# Patient Record
Sex: FEMALE | Race: AMERICAN INDIAN OR ALASKA NATIVE
[De-identification: names, ages, dates, MRNs, and addresses within clinical notes are randomized per-mention and may not be internally consistent; named-entity substitution may affect disease eponyms.]

---

## 2017-02-13 ENCOUNTER — HOSPITAL ENCOUNTER (EMERGENCY)
Dept: HOSPITAL 5 - ED | Age: 67
Discharge: HOME | End: 2017-02-13
Payer: MEDICARE

## 2017-02-13 VITALS — SYSTOLIC BLOOD PRESSURE: 160 MMHG | DIASTOLIC BLOOD PRESSURE: 100 MMHG

## 2017-02-13 DIAGNOSIS — F17.200: ICD-10-CM

## 2017-02-13 DIAGNOSIS — J44.9: ICD-10-CM

## 2017-02-13 DIAGNOSIS — R05: ICD-10-CM

## 2017-02-13 DIAGNOSIS — J02.9: Primary | ICD-10-CM

## 2017-02-13 DIAGNOSIS — I63.9: ICD-10-CM

## 2017-02-13 DIAGNOSIS — J01.90: ICD-10-CM

## 2017-02-13 DIAGNOSIS — I10: ICD-10-CM

## 2017-02-13 PROCEDURE — 71020: CPT

## 2017-02-13 PROCEDURE — 94640 AIRWAY INHALATION TREATMENT: CPT

## 2017-02-13 NOTE — EMERGENCY DEPARTMENT REPORT
ED Shortness of Breath HPI





- General


Chief Complaint: Upper Respiratory Infection


Stated Complaint: UPPER RESP INFECTION


Time Seen by Provider: 02/13/17 19:19


Source: patient


Mode of arrival: Ambulatory


Limitations: No Limitations





- History of Present Illness


Initial Comments: 





Patient here complaining of cough and cold symptoms and reported her sinuses 

are acting up.  She says she has a productive cough with yellow mucus.  Denies 

any fever or chills.  Patient stated that she walked to her primary care 

physician office today and that with atrial flutter breath.  She says she is 

out of her oxygen tank at home and Dr. CARLTON ordered oxygen for her and  will 

receive tomorrow.  She denies any abdominal pain, nausea or vomiting.  Patient 

says she is on oxygen as needed for chronic obstructive pulmonary disease.  She 

says she is not on any medication.  Denies any chest pain.


MD Complaint: shortness of breath, cough


-: This afternoon


Pain Scale: 0


Improves With: oxygen


Worsens With: nothing


Known History Of: COPD


Context: recent URI, occured during exertion


Associated Symptoms: cough, sputum production


Treatments Prior to Arrival: none





- Related Data


Home Oxygen Therapy: Yes


Home Oxygen Amount: 2 Liters


 Previous Rx's











 Medication  Instructions  Recorded  Last Taken  Type


 


cloNIDine [Catapres] 0.2 mg PO TID #30 tablet 09/22/15 2 Days Ago Rx





   0.2 


 


hydrALAZINE [Apresoline TAB] 50 mg PO Q8HR #90 tablet 09/22/15 1 Day Ago Rx





   50 


 


ALBUTEROL Inhaler [ProAir HFA 2 puff IH QID PRN #1 inhalation 02/13/17 Unknown 

Rx





Inhaler]    


 


Azithromycin [Zithromax Z-MAGEN] 250 mg PO DAILY #6 tab 02/13/17 Unknown Rx


 


Fluticasone [Flonase] 1 spray NS QDAY #1 bottle 02/13/17 Unknown Rx


 


predniSONE [Deltasone] 50 mg PO QDAY #6 tab 02/13/17 Unknown Rx











 Allergies











Allergy/AdvReac Type Severity Reaction Status Date / Time


 


No Known Allergies Allergy   Verified 09/18/15 07:34














ED Review of Systems


ROS: 


Stated complaint: UPPER RESP INFECTION


Other details as noted in HPI





Comment: All other systems reviewed and negative


Constitutional: denies: chills, fever


Eyes: denies: eye discharge


ENT: congestion


Respiratory: cough, shortness of breath, SOB with exertion.  denies: orthopnea, 

wheezing


Cardiovascular: denies: chest pain, palpitations, edema, syncope


Gastrointestinal: denies: abdominal pain, nausea, vomiting


Musculoskeletal: denies: back pain, arthralgia


Skin: denies: rash


Neurological: denies: headache, weakness, abnormal gait, vertigo





ED Past Medical Hx





- Past Medical History


Previous Medical History?: Yes


Hx Hypertension: Yes


Hx CVA: Yes (hemorragic cva 9/2015)


Hx COPD: Yes


Hx Dementia: No


Hx HIV: No


Additional medical history: bells palsy





- Surgical History


Past Surgical History?: No





- Family History


Family history: hypertension





- Social History


Smoking Status: Current Some Day Smoker


Substance Use Type: None





- Medications


Home Medications: 


 Home Medications











 Medication  Instructions  Recorded  Confirmed  Last Taken  Type


 


cloNIDine [Catapres] 0.2 mg PO TID #30 tablet 09/22/15 09/18/16 2 Days Ago Rx





    0.2 


 


hydrALAZINE [Apresoline TAB] 50 mg PO Q8HR #90 tablet 09/22/15 09/18/16 1 Day 

Ago Rx





    50 


 


ALBUTEROL Inhaler [ProAir HFA 2 puff IH QID PRN #1 inhalation 02/13/17  Unknown 

Rx





Inhaler]     


 


Azithromycin [Zithromax Z-MAGEN] 250 mg PO DAILY #6 tab 02/13/17  Unknown Rx


 


Fluticasone [Flonase] 1 spray NS QDAY #1 bottle 02/13/17  Unknown Rx


 


predniSONE [Deltasone] 50 mg PO QDAY #6 tab 02/13/17  Unknown Rx














ED Physical Exam





- General


Limitations: No Limitations


General appearance: alert, in no apparent distress





- Head


Head exam: Present: atraumatic, normocephalic, normal inspection





- Eye


Eye exam: Present: normal appearance, PERRL, EOMI


Pupils: Present: normal accommodation





- ENT


ENT exam: Present: normal external ear exam, other (Bilateral nasal mucosa 

erythema congested.  Sinuses nontender to palpate.).  Absent: TM's normal 

bilaterally (Bilateral TMs congested.)





- Respiratory


Respiratory exam: Present: wheezes, rhonchi, decreased breath sounds, other (

congested cough).  Absent: respiratory distress, rales, stridor, chest wall 

tenderness, accessory muscle use





- Cardiovascular


Cardiovascular Exam: Present: regular rate, normal rhythm, normal heart sounds





- GI/Abdominal


GI/Abdominal exam: Present: soft, normal bowel sounds





- Extremities Exam


Extremities exam: Present: normal inspection, full ROM, normal capillary 

refill.  Absent: tenderness, pedal edema, joint swelling, calf tenderness





- Back Exam


Back exam: Present: normal inspection, full ROM.  Absent: tenderness, CVA 

tenderness (R), CVA tenderness (L), muscle spasm, paraspinal tenderness, 

vertebral tenderness, rash noted





- Neurological Exam


Neurological exam: Present: alert, oriented X3, normal gait, reflexes normal.  

Absent: motor sensory deficit





- Psychiatric


Psychiatric exam: Present: normal affect, normal mood





- Skin


Skin exam: Present: warm, dry, intact, normal color.  Absent: rash





ED Course


 Vital Signs











  02/13/17 02/13/17 02/13/17





  14:32 18:33 19:02


 


Temperature 98.6 F 98.8 F 


 


Pulse Rate 93 H 89 74


 


Respiratory  20 





Rate   


 


Blood Pressure 136/91 160/100 


 


O2 Sat by Pulse  93 90





Oximetry   














 Vital Signs











  02/13/17 02/13/17 02/13/17





  14:32 18:33 19:02


 


Temperature 98.6 F 98.8 F 


 


Pulse Rate 93 H 89 74


 


Respiratory  20 





Rate   


 


Blood Pressure 136/91 160/100 


 


O2 Sat by Pulse  93 90





Oximetry   














  02/13/17





  20:59


 


Temperature 


 


Pulse Rate 


 


Respiratory 





Rate 


 


Blood Pressure 


 


O2 Sat by Pulse 93





Oximetry 














- Reevaluation(s)


Reevaluation #1: 





02/13/17 21:03


I spoke with Dr. Rea.  Patient to follow up with him in his office tomorrow


Reevaluation #2: 





02/13/17 21:21


Status post nebulizer treatment.  Patient's pulse ox is 93% ambulatory.  Lungs 

sounds with scattered wheezing to upper lung fields.





ED Medical Decision Making





- Radiology Data


Radiology results: image reviewed


interpreted by me: 





Chest x-ray revealed no acute cardiopulmonary processes. Xray reviewed by Dr. May





- Medical Decision Making





ED course: Patient presented to emergency room with worsening shortness of 

breath after walking to her primary care physician office today.  She has a 

history of chronic obstructive pulmonary disease and says that she uses home O2 

as needed.  She says she went to her primary care doctor for antibiotic for 

sinus infection and for him to O2 tank.  I discussed the patient and S2 O2 tank 

from the hospital she will need to be admitted and seen by  to set 

up O2 for home.  Patient is going to stay here she wants to go home.  I spoke 

with Dr. Rea regarding patient presentation to hospital clinical findings.  

He wants patient to follow-up with him in his office tomorrow.  I discussed 

with patient and she is in agreement.  I discussed with her that her chest x-

ray results was negative for infection.  Patient given DuoNeb times one 

nebulizer and Deltasone 60 mg in emergency room.  Pulse ox 1 to emergency room 

was 93% on room air. POX on 3 l O2 98%.  Absent.  Pulse ox is 93% on room air.  

Patient will be discharged home with prescription for Zithromax, Flonase, 

prednisone and albuterol.





- Differential Diagnosis


exacerbation of COPD, acute bronchitis,PNA, sinusitis


Critical care attestation.: 


If time is entered above; I have spent that time in minutes in the direct care 

of this critically ill patient, excluding procedure time.








ED Disposition


Clinical Impression: 


 Cough





Acute bronchitis


Qualifiers:


 Bronchitis organism: unspecified organism Qualified Code(s): J20.9 - Acute 

bronchitis, unspecified





Sinusitis


Qualifiers:


 Sinusitis location: unspecified location Chronicity: acute Recurrence: not 

specified as recurrent Qualified Code(s): J01.90 - Acute sinusitis, unspecified





Disposition: DISCHARGED TO HOME OR SELFCARE


Is pt being admited?: No


Does the pt Need Aspirin: No


Condition: Stable


Instructions:  Acute Bronchitis (ED), Sinusitis (ED), Acute Cough (ED)


Additional Instructions: 


Follow-up with primary care physician in the morning.  I spoke with Dr. Rea 

and he wants you to followup in the morning


Take medications as prescribed


Prescriptions: 


ALBUTEROL Inhaler [ProAir HFA Inhaler] 2 puff IH QID PRN #1 inhalation


 PRN Reason: Wheezing and Cough


Azithromycin [Zithromax Z-MAGEN] 250 mg PO DAILY #6 tab


Fluticasone [Flonase] 1 spray NS QDAY #1 bottle


predniSONE [Deltasone] 50 mg PO QDAY #6 tab


Referrals: 


FADIA REA MD [Primary Care Provider] - 02/14/17


Forms:  Work/School Release Form(ED)

## 2017-02-14 NOTE — XRAY REPORT
CHEST X-RAY, 2 VIEWS:



HISTORY: Shortness of breath.



FINDINGS: 

Compared to 12/16/16. 



Heart size and pulmonary vascularity are stable at the upper limits of 

normal. Linear scarring in the lingula is unchanged. Otherwise, the 

lungs are generally clear. Chronic changes in the lung bases are noted. 

No pleural effusion or pneumothorax.



IMPRESSION: 

No acute cardiopulmonary process. No change since 12/16/16.

## 2017-12-11 ENCOUNTER — HOSPITAL ENCOUNTER (EMERGENCY)
Dept: HOSPITAL 5 - ED | Age: 67
Discharge: HOME | End: 2017-12-11
Payer: MEDICARE

## 2017-12-11 VITALS — SYSTOLIC BLOOD PRESSURE: 153 MMHG | DIASTOLIC BLOOD PRESSURE: 91 MMHG

## 2017-12-11 DIAGNOSIS — F17.200: ICD-10-CM

## 2017-12-11 DIAGNOSIS — I10: ICD-10-CM

## 2017-12-11 DIAGNOSIS — J44.9: ICD-10-CM

## 2017-12-11 DIAGNOSIS — R51: Primary | ICD-10-CM

## 2017-12-11 LAB
ANION GAP SERPL CALC-SCNC: 20 MMOL/L
APTT BLD: 33.4 SEC. (ref 24.2–36.6)
BASOPHILS NFR BLD AUTO: 0.8 % (ref 0–1.8)
BUN SERPL-MCNC: 9 MG/DL (ref 7–17)
BUN/CREAT SERPL: 13 %
CALCIUM SERPL-MCNC: 8.8 MG/DL (ref 8.4–10.2)
CHLORIDE SERPL-SCNC: 99.2 MMOL/L (ref 98–107)
CO2 SERPL-SCNC: 24 MMOL/L (ref 22–30)
EOSINOPHIL NFR BLD AUTO: 2 % (ref 0–4.3)
GLUCOSE SERPL-MCNC: 190 MG/DL (ref 65–100)
HCT VFR BLD CALC: 57.6 % (ref 30.3–42.9)
HGB BLD-MCNC: 19.3 GM/DL (ref 10.1–14.3)
INR PPP: 0.99 (ref 0.87–1.13)
MCH RBC QN AUTO: 31 PG (ref 28–32)
MCHC RBC AUTO-ENTMCNC: 34 % (ref 30–34)
MCV RBC AUTO: 93 FL (ref 79–97)
PLATELET # BLD: 231 K/MM3 (ref 140–440)
POTASSIUM SERPL-SCNC: 3.9 MMOL/L (ref 3.6–5)
RBC # BLD AUTO: 6.18 M/MM3 (ref 3.65–5.03)
SODIUM SERPL-SCNC: 139 MMOL/L (ref 137–145)
WBC # BLD AUTO: 8 K/MM3 (ref 4.5–11)

## 2017-12-11 PROCEDURE — 85025 COMPLETE CBC W/AUTO DIFF WBC: CPT

## 2017-12-11 PROCEDURE — 36415 COLL VENOUS BLD VENIPUNCTURE: CPT

## 2017-12-11 PROCEDURE — 84484 ASSAY OF TROPONIN QUANT: CPT

## 2017-12-11 PROCEDURE — 70450 CT HEAD/BRAIN W/O DYE: CPT

## 2017-12-11 PROCEDURE — 80048 BASIC METABOLIC PNL TOTAL CA: CPT

## 2017-12-11 PROCEDURE — 85670 THROMBIN TIME PLASMA: CPT

## 2017-12-11 PROCEDURE — 85610 PROTHROMBIN TIME: CPT

## 2017-12-11 PROCEDURE — 85730 THROMBOPLASTIN TIME PARTIAL: CPT

## 2017-12-11 NOTE — EMERGENCY DEPARTMENT REPORT
ED Headache HPI





- General


Chief Complaint: Headache


Stated Complaint: STROKE


Time Seen by Provider: 17 11:25


Source: patient





- History of Present Illness


Initial Comments: 





Patient is 66 years old female history of hypertension and hemorrhagic CVA in 

2015, presented to the ER complaining of headache started last night, throbbing 

in nature.  Patient denied any weakness, numbness or tingling sensation.  

Patient denied fever or stiff neck.  She also denied any bowel or bladder 

incontinence.  Patient also stated that for the last few days she's been having 

runny nose and congestion.


Timing/Duration: 24 hours


Quality: moderate


Head Injury Location: global


Recent Head Trauma: no recent headache/trauma, frequent headaches


Associated Symptoms: sinus infection.  denies: denies symptoms, confusion, 

fatigue, facial pain, fever/chills, flushing, loss of consciousness, nausea/

vomiting, nasal congestion, nasal drainage, numbness in legs/feet, rash, 

seizures, stiff neck, vision changes, weakness, other


Allergies/Adverse Reactions: 


Allergies





No Known Allergies Allergy (Verified 09/18/15 07:34)


 








Home Medications: 


Ambulatory Orders





cloNIDine [Catapres] 0.2 mg PO TID #30 tablet 09/22/15 


hydrALAZINE [Apresoline TAB] 50 mg PO Q8HR #90 tablet 09/22/15 


ALBUTEROL Inhaler [ProAir HFA Inhaler] 2 puff IH QID PRN #1 inhalation 17 


Azithromycin [Zithromax Z-MAGEN] 250 mg PO DAILY #6 tab 17 


Fluticasone [Flonase] 1 spray NS QDAY #1 bottle 17 


predniSONE [Deltasone] 50 mg PO QDAY #6 tab 17 











ED Review of Systems


ROS: 


Stated complaint: STROKE


Other details as noted in HPI





Comment: All other systems reviewed and negative


Constitutional: denies: chills, fever


ENT: congestion


Respiratory: denies: cough, shortness of breath, SOB with exertion, SOB at rest


Cardiovascular: denies: chest pain, palpitations


Gastrointestinal: denies: abdominal pain, nausea, vomiting


Genitourinary: denies: urgency, dysuria


Musculoskeletal: denies: back pain


Skin: denies: rash, lesions


Neurological: headache.  denies: weakness, numbness, paresthesias, confusion


Psychiatric: denies: depression





ED Past Medical Hx





- Past Medical History


Hx Hypertension: Yes


Hx CVA: Yes (hemorragic cva 2015)


Hx COPD: Yes


Hx Dementia: No


Hx HIV: No


Additional medical history: bells palsy, dementia





- Surgical History


Past Surgical History?: No





- Social History


Smoking Status: Current Every Day Smoker


Substance Use Type: None





- Medications


Home Medications: 


 Home Medications











 Medication  Instructions  Recorded  Confirmed  Last Taken  Type


 


cloNIDine [Catapres] 0.2 mg PO TID #30 tablet 09/22/15 09/18/16 2 Days Ago Rx





    ~16 





    0.2 


 


hydrALAZINE [Apresoline TAB] 50 mg PO Q8HR #90 tablet 09/22/15 09/18/16 1 Day 

Ago Rx





    ~16 





    50 


 


ALBUTEROL Inhaler [ProAir HFA 2 puff IH QID PRN #1 inhalation 17  Unknown 

Rx





Inhaler]     


 


Azithromycin [Zithromax Z-MAGEN] 250 mg PO DAILY #6 tab 17  Unknown Rx


 


Fluticasone [Flonase] 1 spray NS QDAY #1 bottle 17  Unknown Rx


 


predniSONE [Deltasone] 50 mg PO QDAY #6 tab 17  Unknown Rx














ED Physical Exam





- General


Limitations: No Limitations


General appearance: alert, in no apparent distress





- Head


Head exam: Present: atraumatic, normocephalic, normal inspection





- Eye


Eye exam: Present: normal appearance, PERRL


Pupils: Present: normal accommodation





- ENT


ENT exam: Present: normal exam, normal orophraynx, mucous membranes moist





- Neck


Neck exam: Present: normal inspection, full ROM.  Absent: tenderness, 

meningismus, lymphadenopathy, thyromegaly





- Respiratory


Respiratory exam: Present: normal lung sounds bilaterally.  Absent: respiratory 

distress, wheezes, rales, rhonchi, stridor, chest wall tenderness, accessory 

muscle use, decreased breath sounds, prolonged expiratory





- Cardiovascular


Cardiovascular Exam: Present: regular rate, normal rhythm, normal heart sounds





- GI/Abdominal


GI/Abdominal exam: Present: soft, normal bowel sounds.  Absent: distended, 

tenderness, guarding, rebound, rigid, organomegaly, mass, bruit, pulsatile mass

, hernia





- Extremities Exam


Extremities exam: Present: normal inspection, full ROM, normal capillary refill





- Back Exam


Back exam: Present: normal inspection.  Absent: tenderness, CVA tenderness (R), 

CVA tenderness (L)





- Neurological Exam


Neurological exam: Present: alert, oriented X3, CN II-XII intact, normal gait, 

reflexes normal.  Absent: abnormal gait, motor sensory deficit





- Psychiatric


Psychiatric exam: Present: normal mood.  Absent: depressed, suicidal ideation





- Skin


Skin exam: Present: warm, intact, normal color





ED Course


 Vital Signs











  17





  09:22


 


Temperature 98.4 F


 


Pulse Rate 79


 


Respiratory 19





Rate 


 


Blood Pressure 153/91


 


O2 Sat by Pulse 88





Oximetry 














- Reevaluation(s)


Reevaluation #1: 





17 15:44


Patient stated that she is feeling much better.  Patient denied any weakness 

numbness or tingling sensation.  No bowel or bladder incontinence.





ED Medical Decision Making





- Lab Data


Result diagrams: 


 17 09:41





 17 09:41





- Radiology Data


Radiology results: report reviewed





Referring Physician:   POONAM ROLLE


Patient Name:   TAIWO LOWERY


Patient ID:   [id number]


YOB: 1950


Sex:   Female


Accession:   [id number]


Report Date:   2017


Report Status:   Finalized


Findings





[place] [address] 





Cat Scan Report 


Signed 





Patient: TAIWO LOWERY MR#: [id number] 


: 1950 Acct:[id number] 


Age/Sex: 66 / F ADM Date: 17 


Loc: ED 


Attending Dr: 








Ordering Physician: POONAM ROLLE MD 


Date of Service: 17 


Procedure(s): CT head/brain wo con 


Accession Number(s): [id number] 





cc: POONAM ROLLE MD 








CT HEAD WITHOUT CONTRAST: 





HISTORY: Stroke. 





TECHNIQUE: 


Sequential CT images without contrast. 





FINDINGS: 


Images obtained show bilateral prominence of the sulci and 


ventricles. There are no abnormal intra- or extra-axial blood or 


fluid collections. There are no focal masses or evidence of mass 


effect. The gray white matter differentiation appears within normal 


limits. Regions of periventricular decreased attenuation are 


consistent with microangiopathic ischemic disease. The posterior fossa 


structures including the fourth ventricle, cerebellum, and brainstem 


appear normal. The sinuses are clear. Partial opacification of the 


right mastoid air cells is unchanged since 16. 





IMPRESSION: 


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 No acute 


intracranial process noted. No significant change since 16. 





Transcribed By: TTR 


Dictated By: THA MARIE JR, MD 


Electronically Authenticated By: THA MARIE JR, MD 


Signed Date/Time: 17 1012 











DD/DT: 17 1011 


TD/TT: 17 1012


Critical care attestation.: 


If time is entered above; I have spent that time in minutes in the direct care 

of this critically ill patient, excluding procedure time.








ED Disposition


Clinical Impression: 


 Headache





Disposition: DC-01 TO HOME OR SELFCARE


Is pt being admited?: No


Condition: Stable


Instructions:  Acute Headache (ED)


Referrals: 


PRIMARY CARE,MD [Primary Care Provider] - 3-5 Days

## 2017-12-11 NOTE — CAT SCAN REPORT
CT HEAD WITHOUT CONTRAST:



HISTORY: Stroke.



TECHNIQUE:

Sequential CT images without contrast.



FINDINGS:

Images obtained show bilateral prominence of the sulci and

ventricles.  There are no abnormal intra- or extra-axial blood or

fluid collections.  There are no focal masses or evidence of mass

effect. The gray white matter differentiation appears within normal 

limits.  Regions of periventricular decreased attenuation are 

consistent with microangiopathic ischemic disease.  The posterior fossa 

structures including the fourth ventricle, cerebellum, and brainstem 

appear normal. The sinuses are clear. Partial opacification of the 

right mastoid air cells is unchanged since 12/16/16.



IMPRESSION:

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  No acute 

intracranial process noted. No significant change since 12/16/16.

## 2019-07-25 ENCOUNTER — HOSPITAL ENCOUNTER (EMERGENCY)
Dept: HOSPITAL 5 - ED | Age: 69
LOS: 1 days | Discharge: HOME | End: 2019-07-26
Payer: SELF-PAY

## 2019-07-25 DIAGNOSIS — J44.9: ICD-10-CM
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
## 2019-07-25 DIAGNOSIS — G51.0: ICD-10-CM
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
## 2019-07-25 DIAGNOSIS — F03.90: Primary | ICD-10-CM

## 2019-07-25 DIAGNOSIS — I10: ICD-10-CM
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
## 2019-07-25 PROCEDURE — 84443 ASSAY THYROID STIM HORMONE: CPT

## 2019-07-25 PROCEDURE — 80320 DRUG SCREEN QUANTALCOHOLS: CPT

## 2019-07-25 PROCEDURE — 80053 COMPREHEN METABOLIC PANEL: CPT

## 2019-07-25 PROCEDURE — 87086 URINE CULTURE/COLONY COUNT: CPT

## 2019-07-25 PROCEDURE — G0480 DRUG TEST DEF 1-7 CLASSES: HCPCS

## 2019-07-25 PROCEDURE — 36415 COLL VENOUS BLD VENIPUNCTURE: CPT

## 2019-07-25 PROCEDURE — 81001 URINALYSIS AUTO W/SCOPE: CPT

## 2019-07-25 PROCEDURE — 83735 ASSAY OF MAGNESIUM: CPT

## 2019-07-25 PROCEDURE — 71045 X-RAY EXAM CHEST 1 VIEW: CPT

## 2019-07-25 PROCEDURE — 82550 ASSAY OF CK (CPK): CPT

## 2019-07-25 PROCEDURE — 82140 ASSAY OF AMMONIA: CPT

## 2019-07-25 PROCEDURE — 85025 COMPLETE CBC W/AUTO DIFF WBC: CPT

## 2019-07-25 PROCEDURE — 72170 X-RAY EXAM OF PELVIS: CPT

## 2019-07-25 PROCEDURE — 93005 ELECTROCARDIOGRAM TRACING: CPT

## 2019-07-25 PROCEDURE — 94644 CONT INHLJ TX 1ST HOUR: CPT

## 2019-07-25 PROCEDURE — 93010 ELECTROCARDIOGRAM REPORT: CPT

## 2019-07-25 PROCEDURE — 72125 CT NECK SPINE W/O DYE: CPT

## 2019-07-25 PROCEDURE — 70450 CT HEAD/BRAIN W/O DYE: CPT

## 2019-07-26 VITALS — SYSTOLIC BLOOD PRESSURE: 145 MMHG | DIASTOLIC BLOOD PRESSURE: 85 MMHG

## 2019-07-26 LAB
ALBUMIN SERPL-MCNC: 3.7 G/DL (ref 3.9–5)
ALT SERPL-CCNC: 17 UNITS/L (ref 7–56)
BASOPHILS # (AUTO): 0.1 K/MM3 (ref 0–0.1)
BASOPHILS NFR BLD AUTO: 1 % (ref 0–1.8)
BILIRUB UR QL STRIP: (no result)
BLOOD UR QL VISUAL: (no result)
BUN SERPL-MCNC: 15 MG/DL (ref 7–17)
BUN/CREAT SERPL: 19 %
CALCIUM SERPL-MCNC: 9.1 MG/DL (ref 8.4–10.2)
EOSINOPHIL # BLD AUTO: 0.2 K/MM3 (ref 0–0.4)
EOSINOPHIL NFR BLD AUTO: 2.2 % (ref 0–4.3)
HCT VFR BLD CALC: 45.1 % (ref 30.3–42.9)
HEMOLYSIS INDEX: 31
HGB BLD-MCNC: 15.2 GM/DL (ref 10.1–14.3)
LYMPHOCYTES # BLD AUTO: 2.7 K/MM3 (ref 1.2–5.4)
LYMPHOCYTES NFR BLD AUTO: 33.6 % (ref 13.4–35)
MCHC RBC AUTO-ENTMCNC: 34 % (ref 30–34)
MCV RBC AUTO: 93 FL (ref 79–97)
MONOCYTES # (AUTO): 0.9 K/MM3 (ref 0–0.8)
MONOCYTES % (AUTO): 10.8 % (ref 0–7.3)
MUCOUS THREADS #/AREA URNS HPF: (no result) /HPF
PH UR STRIP: 5 [PH] (ref 5–7)
PLATELET # BLD: 207 K/MM3 (ref 140–440)
PROT UR STRIP-MCNC: (no result) MG/DL
RBC # BLD AUTO: 4.86 M/MM3 (ref 3.65–5.03)
RBC #/AREA URNS HPF: 4 /HPF (ref 0–6)
UROBILINOGEN UR-MCNC: 2 MG/DL (ref ?–2)
WBC #/AREA URNS HPF: 2 /HPF (ref 0–6)

## 2019-07-26 NOTE — EMERGENCY DEPARTMENT REPORT
ED General Adult HPI





- General


Chief complaint: Fall


Stated complaint: COPD DEMENTIA CONSTANTLY FALLING DOWN


Time Seen by Provider: 07/26/19 00:52


Source: patient, family, RN notes reviewed


Mode of arrival: Wheelchair


Limitations: Physical Limitation





- History of Present Illness


Initial comments: 





Primary care Dr.: Dr. Martin Perez





Past medical history: Dementia, stroke





This is a 68-year-old female.  She is brought to the hospital by family.  Family

endorse that the patient has been having increasing frequency of falls, 

increasing unsteady gait, accidentally urinating on herself, and not able to 

take care of herself.  The symptoms have been going on for the past few weeks.  

They are constant, does not radiate anywhere as per family do not have exa

cerbating or liver factors, and they appear to be worsening.  The patient 

endorses no complaints to this provider.  








As per family, they're not sure she is taking any new medications.  However, 

they do believe that she is taking a "dementia medication."  Below the name of 

this medication.  So far, patient as per family has not been evaluated for home 

health and home physical therapy.








The patient as per family is walking, and during walking, gradually starts to 

bend over.





-: Gradual, week(s)


Consistency: other


Improves with: other


Worsens with: other





- Related Data


                                  Previous Rx's











 Medication  Instructions  Recorded  Last Taken  Type


 


cloNIDine [Catapres] 0.2 mg PO TID #30 tablet 09/22/15 2 Days Ago Rx





   ~09/16/16 





   0.2 


 


hydrALAZINE [Apresoline TAB] 50 mg PO Q8HR #90 tablet 09/22/15 1 Day Ago Rx





   ~09/17/16 





   50 


 


ALBUTEROL Inhaler (OR & NICU) 2 puff IH QID PRN #1 inhalation 02/13/17 Unknown 

Rx





[ProAir HFA Inhaler]    


 


Azithromycin [Zithromax Z-MAGEN] 250 mg PO DAILY #6 tab 02/13/17 Unknown Rx


 


Fluticasone [Flonase] 1 spray NS QDAY #1 bottle 02/13/17 Unknown Rx


 


predniSONE [Deltasone] 50 mg PO QDAY #6 tab 02/13/17 Unknown Rx


 


Ondansetron [Zofran Odt] 4 mg PO Q8HR PRN #14 tab.rapdis 12/11/17 Unknown Rx


 


traMADol [Ultram 50 MG tab] 50 mg PO Q4HR PRN #14 tablet 12/11/17 Unknown Rx


 


Albuterol Sulfate [Proair 90 mcg IH Q4HR PRN #2 aer.pow.ba 07/26/19 Unknown Rx





Respiclick]    











                                    Allergies











Allergy/AdvReac Type Severity Reaction Status Date / Time


 


No Known Allergies Allergy   Verified 09/18/15 07:34














ED Review of Systems


ROS: 


Stated complaint: COPD DEMENTIA CONSTANTLY FALLING DOWN


Other details as noted in HPI





Comment: Family


Constitutional: denies: fever, weakness


ENT: denies: congestion


Respiratory: cough


Cardiovascular: denies: syncope


Gastrointestinal: denies: nausea, vomiting


Genitourinary: other (patient accidentally urinating on his).  denies: frequency


Musculoskeletal: other (fall to the right upper elbow)


Skin: other (abrasion to right arm)


Neurological: confusion


Hematological/Lymphatic: denies: easy bleeding





ED Past Medical Hx





- Past Medical History


Previous Medical History?: Yes


Hx Hypertension: Yes


Hx CVA: Yes (hemorragic cva 9/2015)


Hx COPD: Yes


Hx Dementia: No


Hx HIV: No


Additional medical history: bells palsy, dementia





- Surgical History


Past Surgical History?: No





- Social History


Smoking Status: Former Smoker


Substance Use Type: None





- Medications


Home Medications: 


                                Home Medications











 Medication  Instructions  Recorded  Confirmed  Last Taken  Type


 


cloNIDine [Catapres] 0.2 mg PO TID #30 tablet 09/22/15 09/18/16 2 Days Ago Rx





    ~09/16/16 





    0.2 


 


hydrALAZINE [Apresoline TAB] 50 mg PO Q8HR #90 tablet 09/22/15 09/18/16 1 Day 

Ago Rx





    ~09/17/16 





    50 


 


ALBUTEROL Inhaler (OR & NICU) 2 puff IH QID PRN #1 inhalation 02/13/17  Unknown 

Rx





[ProAir HFA Inhaler]     


 


Azithromycin [Zithromax Z-MAGEN] 250 mg PO DAILY #6 tab 02/13/17  Unknown Rx


 


Fluticasone [Flonase] 1 spray NS QDAY #1 bottle 02/13/17  Unknown Rx


 


predniSONE [Deltasone] 50 mg PO QDAY #6 tab 02/13/17  Unknown Rx


 


Ondansetron [Zofran Odt] 4 mg PO Q8HR PRN #14 tab.rapdis 12/11/17  Unknown Rx


 


traMADol [Ultram 50 MG tab] 50 mg PO Q4HR PRN #14 tablet 12/11/17  Unknown Rx


 


Albuterol Sulfate [Proair 90 mcg IH Q4HR PRN #2 aer.pow.ba 07/26/19  Unknown Rx





Respiclick]     














ED Physical Exam





- General


Limitations: Other (patient is demented.  The patient is a poor historian.  The 

patient follows commands.  The patient is alert to name.)


General appearance: alert, in no apparent distress





- Head


Head exam: Present: atraumatic, normocephalic





- Eye


Eye exam: Present: normal appearance, EOMI.  Absent: nystagmus





- ENT


ENT exam: Present: normal exam, normal orophraynx, mucous membranes moist, 

normal external ear exam





- Neck


Neck exam: Present: normal inspection, full ROM.  Absent: tenderness, 

meningismus





- Respiratory


Respiratory exam: Present: normal lung sounds bilaterally.  Absent: respiratory 

distress, wheezes, rales, rhonchi, stridor





- Cardiovascular


Cardiovascular Exam: Present: regular rate, normal rhythm, normal heart sounds. 

Absent: bradycardia, tachycardia, irregular rhythm, systolic murmur, diastolic 

murmur, rubs, gallop





- GI/Abdominal


GI/Abdominal exam: Present: soft.  Absent: distended, tenderness, guarding, 

rebound, rigid, pulsatile mass





- Extremities Exam


Extremities exam: Present: normal inspection (abrasion noted to right elbow), 

full ROM, other (2+ pulses noted in the bilateral upper, lower extremities.  

Compartments soft.  No long bony tenderness.  The pelvis is stable.).  Absent: 

calf tenderness





- Back Exam


Back exam: Present: normal inspection, full ROM.  Absent: tenderness, CVA 

tenderness (R), CVA tenderness (L), paraspinal tenderness, vertebral tenderness





- Neurological Exam


Neurological exam: Present: alert, normal gait (patient walks with a steady gait

with a one-person assist), other (Extraocular movements intact.  Tongue midline.

 No facial droop.  Facial sensation intact to light touch in the V1, V2, V3 

distribution bilaterally.  5 and 5 strength in 4 extremities..  Sensation is 

intact to light touch in 4 extremities.).  Absent: motor sensory deficit





- Psychiatric


Psychiatric exam: Present: normal affect, normal mood





- Skin


Skin exam: Present: warm, dry, intact, normal color.  Absent: rash





ED Course


                                   Vital Signs











  07/25/19 07/26/19 07/26/19





  23:41 01:45 03:35


 


Temperature 98.3 F  


 


Pulse Rate 75  


 


Pulse Rate [   68





Bilateral   





Throughout]   


 


Respiratory 14 16 





Rate   


 


Respiratory   18





Rate [Bilateral   





Throughout]   


 


Blood Pressure 127/88  


 


Blood Pressure   





[Left]   


 


O2 Sat by Pulse 96 99 





Oximetry   














  07/26/19





  05:15


 


Temperature 98.1 F


 


Pulse Rate 66


 


Pulse Rate [ 





Bilateral 





Throughout] 


 


Respiratory 18





Rate 


 


Respiratory 





Rate [Bilateral 





Throughout] 


 


Blood Pressure 


 


Blood Pressure 145/85





[Left] 


 


O2 Sat by Pulse 96





Oximetry 














- Reevaluation(s)


Reevaluation #1: 





07/26/19 02:17


Differential diagnosis, including not limited to: Intracranial injury, cervical 

spine injury, electrolyte derangement, pneumonia, urinary tract infection, 

thyroid derangement, dementia





Assessment and plan: 68-year-old female who walks with a steady gait with 

minimal one-person assist, no evidence of lower extremity weakness, no endorse 

complaint unintentional urinary retention, urinating on herself because she 

can't get to the bathroom in time, the experiencing the natural expected 

progression of dementia.  She is hemodynamically stable with unremarkable 

physical examination.  CT scan of the brain and cervical spine are ordered.  Scr

eening laboratory studies ordered.  Case management consultation physical 

therapy evaluation ordered.  Discussed with family the patient is likely 

experiencing natural history of dementia, and that she will likely need 

assistance, either in the form of a home health aide, or possible placement into

 a personal care facility.  We will defer displacements and evaluation to 

outpatient physical therapy, primary care, and case management.  We have ordered

 case management and physical therapy consults to assist with this.


Reevaluation #2: 





07/26/19 05:53


CT scan of the brain, cervical spine negative for traumatic disease.  Patient 

resting comfortably in her room, and in no acute distress.  Of note, patient had

 a delayed her stay and disposition as family initially declined 

catheterization.  Urinalysis is not consistent with urinary tract infection.  

Patient medically suitable to be discharged home.  Family can watch her.  I will

 defer to outpatient primary care and/or case management.





ED Medical Decision Making





- Lab Data


Result diagrams: 


                                 07/26/19 01:30





                                 07/26/19 01:30








                                   Vital Signs











  07/25/19





  23:41


 


Temperature 98.3 F


 


Pulse Rate 75


 


Respiratory 14





Rate 


 


Blood Pressure 127/88


 


O2 Sat by Pulse 96





Oximetry 











                                   Lab Results











  07/26/19 07/26/19 07/26/19 Range/Units





  01:30 01:30 01:30 


 


WBC  8.0    (4.5-11.0)  K/mm3


 


RBC  4.86    (3.65-5.03)  M/mm3


 


Hgb  15.2 H    (10.1-14.3)  gm/dl


 


Hct  45.1 H    (30.3-42.9)  %


 


MCV  93    (79-97)  fl


 


MCH  31    (28-32)  pg


 


MCHC  34    (30-34)  %


 


RDW  14.6    (13.2-15.2)  %


 


Plt Count  207    (140-440)  K/mm3


 


Lymph % (Auto)  33.6    (13.4-35.0)  %


 


Mono % (Auto)  10.8 H    (0.0-7.3)  %


 


Eos % (Auto)  2.2    (0.0-4.3)  %


 


Baso % (Auto)  1.0    (0.0-1.8)  %


 


Lymph #  2.7    (1.2-5.4)  K/mm3


 


Mono #  0.9 H    (0.0-0.8)  K/mm3


 


Eos #  0.2    (0.0-0.4)  K/mm3


 


Baso #  0.1    (0.0-0.1)  K/mm3


 


Seg Neutrophils %  52.4    (40.0-70.0)  %


 


Seg Neutrophils #  4.2    (1.8-7.7)  K/mm3


 


Carbon Dioxide   25   (22-30)  mmol/L


 


Creatinine   0.8   (0.7-1.2)  mg/dL


 


Estimated GFR   > 60   ml/min


 


Glucose   113 H   ()  mg/dL


 


Calcium   9.1   (8.4-10.2)  mg/dL


 


Magnesium    2.30  (1.7-2.3)  mg/dL


 


Total Bilirubin   0.30   (0.1-1.2)  mg/dL


 


AST   23   (5-40)  units/L


 


ALT   17   (7-56)  units/L


 


Alkaline Phosphatase   75   ()  units/L


 


Ammonia     (25-60)  umol/L


 


Total Creatine Kinase    304 H  ()  units/L


 


Total Protein   7.7   (6.3-8.2)  g/dL


 


Albumin   3.7 L   (3.9-5)  g/dL


 


Albumin/Globulin Ratio   0.9   %














  07/26/19 Range/Units





  01:30 


 


WBC   (4.5-11.0)  K/mm3


 


RBC   (3.65-5.03)  M/mm3


 


Hgb   (10.1-14.3)  gm/dl


 


Hct   (30.3-42.9)  %


 


MCV   (79-97)  fl


 


MCH   (28-32)  pg


 


MCHC   (30-34)  %


 


RDW   (13.2-15.2)  %


 


Plt Count   (140-440)  K/mm3


 


Lymph % (Auto)   (13.4-35.0)  %


 


Mono % (Auto)   (0.0-7.3)  %


 


Eos % (Auto)   (0.0-4.3)  %


 


Baso % (Auto)   (0.0-1.8)  %


 


Lymph #   (1.2-5.4)  K/mm3


 


Mono #   (0.0-0.8)  K/mm3


 


Eos #   (0.0-0.4)  K/mm3


 


Baso #   (0.0-0.1)  K/mm3


 


Seg Neutrophils %   (40.0-70.0)  %


 


Seg Neutrophils #   (1.8-7.7)  K/mm3


 


Carbon Dioxide   (22-30)  mmol/L


 


Creatinine   (0.7-1.2)  mg/dL


 


Estimated GFR   ml/min


 


Glucose   ()  mg/dL


 


Calcium   (8.4-10.2)  mg/dL


 


Magnesium   (1.7-2.3)  mg/dL


 


Total Bilirubin   (0.1-1.2)  mg/dL


 


AST   (5-40)  units/L


 


ALT   (7-56)  units/L


 


Alkaline Phosphatase   ()  units/L


 


Ammonia  40.0  (25-60)  umol/L


 


Total Creatine Kinase   ()  units/L


 


Total Protein   (6.3-8.2)  g/dL


 


Albumin   (3.9-5)  g/dL


 


Albumin/Globulin Ratio   %














- EKG Data


-: EKG Interpreted by Me


EKG shows normal: sinus rhythm


Rate: normal





- EKG Data





07/26/19 02:20


This is a sinus rhythm, 65 bpm, left axis deviation, left anterior fascicular 

block, atrial enlargement, poor R progression, low voltage, this is an abnormal 

EKG, HEENT is not consistent with ST elevation myocardial infarction, it has 

nonspecific changes when compared to prior EKG from 12/16/2016.





- Radiology Data


Radiology results: pending, report reviewed, image reviewed


interpreted by me: 





X-ray of the chest, pelvis, right arm negative for acute disease.


Critical care attestation.: 


If time is entered above; I have spent that time in minutes in the direct care 

of this critically ill patient, excluding procedure time.








ED Disposition


Clinical Impression: 


 History of fall





Dementia


Qualifiers:


 Alzheimer's disease onset: unspecified onset Dementia behavioral disturbance: 

without behavioral disturbance 





Disposition: DC-01 TO HOME OR SELFCARE


Is pt being admited?: No


Does the pt Need Aspirin: No


Condition: Stable


Instructions:  Dementia (ED)


Additional Instructions: 


As we discussed, patient likely experiencing worsening dementia.  Unfortunately,

 this condition is irreversible.  Patient will likely continue to have falls.  

Patient should follow-up with her primary care doctor for evaluation for 

physical therapy, and to see if she is eligible for either home health aide, or 

placement in a nursing home.  In addition, a case management consult has been 

ordered to see if patient can be set up for any of these things.  The meantime, 

please continue patient's medications, and please make certain that the patient 

is accompanied for most if not all of the day.  Please return to the emergency 

room right away with new, worsening or different symptoms, or symptoms not 

present on the initial emergency room evaluation.








Follow-up with your primary care doctor within the next 7-10 days.





Patient can take Tylenol over-the-counter as needed for pain.  Patient can use 

albuterol medication as directed as needed for cough and/or shortness of breath.








Prescriptions: 


Albuterol Sulfate [Proair Respiclick] 90 mcg IH Q4HR PRN #2 aer.pow.ba


 PRN Reason: Wheezing


Referrals: 


MARTIN PEREZ MD [Primary Care Provider] - 3-5 Days

## 2019-07-26 NOTE — CAT SCAN REPORT
CT CERVICAL SPINE WITHOUT CONTRAST  

 

INDICATION: Altered mental status. History of falls. Possible neck injury.

 

COMPARISON: None available.  



TECHNIQUE: Axial, coronal and sagittal CT imaging of the cervical spine without contrast was performe
d.  All CT scans at this location are performed using CT dose reduction for ALARA by means of automat
ed exposure control.

 

FINDINGS:



VERTEBRAE:No acute fracture. Normal alignment. 

DISC SPACES: Multilevel discogenic degenerative changes are most significant at C5-C6.

FACET JOINTS:No significant abnormality.

CENTRAL CANAL: Moderate central canal stenosis is noted at C3-C4 secondary to a disc protrusion. Ther
e is multilevel bilateral neural foraminal narrowing.

SOFT TISSUES:No acute abnormality. There is mild generalized atherosclerosis.

LUNG APICES: No significant abnormality.



ADDITIONAL FINDINGS: None

 

IMPRESSION:

1.  No acute abnormality of the cervical spine.

2. Cervical spondylosis as above.

3. Moderate central canal stenosis at C3-C4 secondary to a disc protrusion.



Signer Name: Alfredito Raza MD 

Signed: 7/26/2019 4:18 AM

 Workstation Name: [id number]

## 2019-07-26 NOTE — XRAY REPORT
CHEST 1 VIEW 7/26/2019 1:37 AM



INDICATION / CLINICAL INFORMATION:

Weakness. Cough.



COMPARISON: 

None available.



FINDINGS:



SUPPORT DEVICES: None.

HEART / MEDIASTINUM: Normal cardiac size with an ectatic, calcified aorta. 

LUNGS / PLEURA: There is mild left basilar atelectasis. The lungs are otherwise clear. No significant
 pleural effusion. No pneumothorax. 



ADDITIONAL FINDINGS: No significant additional findings.



IMPRESSION:

1. No acute findings.

2. Additional findings as above.



Signer Name: Alfredito Raza MD 

Signed: 7/26/2019 2:20 AM

 Workstation Name: [id number]

## 2019-07-26 NOTE — XRAY REPORT
RIGHT ELBOW 3 VIEWS



INDICATION / CLINICAL INFORMATION:

Right elbow abrasion after fall.



COMPARISON:

None available.

 

FINDINGS:



BONES and JOINT(S): No acute fracture or subluxation. No significant arthritis.

SOFT TISSUES: No significant abnormality.



ADDITIONAL FINDINGS: None.



IMPRESSION:

No acute findings.



Signer Name: Alfredito Raza MD 

Signed: 7/26/2019 2:21 AM

 Workstation Name: [email or web address]

## 2019-07-26 NOTE — CAT SCAN REPORT
CT HEAD WITHOUT CONTRAST



INDICATION : 

Altered mental status. Weakness. Memory loss. History of falls.



TECHNIQUE:  Axial, coronal and sagittal CT imaging was performed from the skull apex through the skul
l base without contrast.  All CT scans at this location are performed using CT dose reduction for ALA
RA by means of automated exposure control. 



COMPARISON:  CT of the head without contrast from 12/11/2017.



FINDINGS:  



PARENCHYMA:  No mass, midline shift, hemorrhage, extraaxial collection or acute territorial infarctio
n. There is similar generalized atrophy with extensive confluent areas of low-attenuation along the p
eriventricular white matter that are consistent with chronic microvascular skinny changes.

VENTRICLES:  Prominent secondary to atrophy. No acute abnormality.  

SOFT TISSUES:  Soft tissues including the orbits appear normal.   

BONES:  No acute osseous abnormality.   

SINUSES: No significant abnormality. 

ADDITIONAL FINDINGS: None. 



IMPRESSION: 

1.  No acute abnormality.

2. Additional stable chronic changes as above.



Signer Name: Alfredito Raza MD 

Signed: 7/26/2019 4:01 AM

 Workstation Name: [id number]

## 2019-07-26 NOTE — XRAY REPORT
PELVIS ONE VIEW



INDICATION / CLINICAL INFORMATION:

Weakness. History of falls. Possible pelvic injury.



COMPARISON:

None available.

 

FINDINGS:



BONES and JOINT(S): No acute fracture or subluxation. No significant arthritis.

SOFT TISSUES: No acute findings. There is mild generalized atherosclerosis.



ADDITIONAL FINDINGS: None.



IMPRESSION:

No acute findings.



Signer Name: Alfredito Raza MD 

Signed: 7/26/2019 2:21 AM

 Workstation Name: [id number]

## 2019-07-30 ENCOUNTER — HOSPITAL ENCOUNTER (INPATIENT)
Dept: HOSPITAL 5 - ED | Age: 69
LOS: 6 days | Discharge: HOME HEALTH | DRG: 100 | End: 2019-08-05
Attending: INTERNAL MEDICINE | Admitting: INTERNAL MEDICINE
Payer: MEDICARE

## 2019-07-30 DIAGNOSIS — I63.9: ICD-10-CM

## 2019-07-30 DIAGNOSIS — Z91.81: ICD-10-CM

## 2019-07-30 DIAGNOSIS — J44.9: ICD-10-CM

## 2019-07-30 DIAGNOSIS — G40.209: Primary | ICD-10-CM

## 2019-07-30 DIAGNOSIS — Z82.49: ICD-10-CM

## 2019-07-30 DIAGNOSIS — Z83.3: ICD-10-CM

## 2019-07-30 DIAGNOSIS — G40.909: ICD-10-CM

## 2019-07-30 DIAGNOSIS — R29.720: ICD-10-CM

## 2019-07-30 DIAGNOSIS — Z87.891: ICD-10-CM

## 2019-07-30 DIAGNOSIS — E85.4: ICD-10-CM

## 2019-07-30 DIAGNOSIS — F03.90: ICD-10-CM

## 2019-07-30 DIAGNOSIS — I68.0: ICD-10-CM

## 2019-07-30 DIAGNOSIS — Z86.73: ICD-10-CM

## 2019-07-30 LAB
APTT BLD: 26.4 SEC. (ref 24.2–36.6)
BASOPHILS # (AUTO): 0.1 K/MM3 (ref 0–0.1)
BASOPHILS NFR BLD AUTO: 0.8 % (ref 0–1.8)
BILIRUB UR QL STRIP: (no result)
BLOOD UR QL VISUAL: (no result)
BUN SERPL-MCNC: 11 MG/DL (ref 7–17)
BUN/CREAT SERPL: 18 %
CALCIUM SERPL-MCNC: 8.7 MG/DL (ref 8.4–10.2)
EOSINOPHIL # BLD AUTO: 0 K/MM3 (ref 0–0.4)
EOSINOPHIL NFR BLD AUTO: 0.2 % (ref 0–4.3)
HCT VFR BLD CALC: 46 % (ref 30.3–42.9)
HDLC SERPL-MCNC: 57 MG/DL (ref 40–59)
HEMOLYSIS INDEX: 0
HGB BLD-MCNC: 15.3 GM/DL (ref 10.1–14.3)
INR PPP: 1.02 (ref 0.87–1.13)
LYMPHOCYTES # BLD AUTO: 0.8 K/MM3 (ref 1.2–5.4)
LYMPHOCYTES NFR BLD AUTO: 8.5 % (ref 13.4–35)
MCHC RBC AUTO-ENTMCNC: 33 % (ref 30–34)
MCV RBC AUTO: 94 FL (ref 79–97)
MONOCYTES # (AUTO): 0.2 K/MM3 (ref 0–0.8)
MONOCYTES % (AUTO): 2.5 % (ref 0–7.3)
MUCOUS THREADS #/AREA URNS HPF: (no result) /HPF
PH UR STRIP: 6 [PH] (ref 5–7)
PLATELET # BLD: 214 K/MM3 (ref 140–440)
PROT UR STRIP-MCNC: (no result) MG/DL
RBC # BLD AUTO: 4.92 M/MM3 (ref 3.65–5.03)
RBC #/AREA URNS HPF: 1 /HPF (ref 0–6)
UROBILINOGEN UR-MCNC: < 2 MG/DL (ref ?–2)
WBC #/AREA URNS HPF: 1 /HPF (ref 0–6)

## 2019-07-30 PROCEDURE — 82306 VITAMIN D 25 HYDROXY: CPT

## 2019-07-30 PROCEDURE — 85025 COMPLETE CBC W/AUTO DIFF WBC: CPT

## 2019-07-30 PROCEDURE — 87116 MYCOBACTERIA CULTURE: CPT

## 2019-07-30 PROCEDURE — 84443 ASSAY THYROID STIM HORMONE: CPT

## 2019-07-30 PROCEDURE — 82607 VITAMIN B-12: CPT

## 2019-07-30 PROCEDURE — 80061 LIPID PANEL: CPT

## 2019-07-30 PROCEDURE — 82140 ASSAY OF AMMONIA: CPT

## 2019-07-30 PROCEDURE — 84484 ASSAY OF TROPONIN QUANT: CPT

## 2019-07-30 PROCEDURE — 70496 CT ANGIOGRAPHY HEAD: CPT

## 2019-07-30 PROCEDURE — 94640 AIRWAY INHALATION TREATMENT: CPT

## 2019-07-30 PROCEDURE — 96366 THER/PROPH/DIAG IV INF ADDON: CPT

## 2019-07-30 PROCEDURE — 71045 X-RAY EXAM CHEST 1 VIEW: CPT

## 2019-07-30 PROCEDURE — 93880 EXTRACRANIAL BILAT STUDY: CPT

## 2019-07-30 PROCEDURE — 70551 MRI BRAIN STEM W/O DYE: CPT

## 2019-07-30 PROCEDURE — 85610 PROTHROMBIN TIME: CPT

## 2019-07-30 PROCEDURE — 84439 ASSAY OF FREE THYROXINE: CPT

## 2019-07-30 PROCEDURE — 70450 CT HEAD/BRAIN W/O DYE: CPT

## 2019-07-30 PROCEDURE — 84425 ASSAY OF VITAMIN B-1: CPT

## 2019-07-30 PROCEDURE — 95819 EEG AWAKE AND ASLEEP: CPT

## 2019-07-30 PROCEDURE — 85730 THROMBOPLASTIN TIME PARTIAL: CPT

## 2019-07-30 PROCEDURE — 83036 HEMOGLOBIN GLYCOSYLATED A1C: CPT

## 2019-07-30 PROCEDURE — 84480 ASSAY TRIIODOTHYRONINE (T3): CPT

## 2019-07-30 PROCEDURE — A9577 INJ MULTIHANCE: HCPCS

## 2019-07-30 PROCEDURE — 82747 ASSAY OF FOLIC ACID RBC: CPT

## 2019-07-30 PROCEDURE — 96361 HYDRATE IV INFUSION ADD-ON: CPT

## 2019-07-30 PROCEDURE — 83735 ASSAY OF MAGNESIUM: CPT

## 2019-07-30 PROCEDURE — 80053 COMPREHEN METABOLIC PANEL: CPT

## 2019-07-30 PROCEDURE — 86800 THYROGLOBULIN ANTIBODY: CPT

## 2019-07-30 PROCEDURE — 93005 ELECTROCARDIOGRAM TRACING: CPT

## 2019-07-30 PROCEDURE — 93010 ELECTROCARDIOGRAM REPORT: CPT

## 2019-07-30 PROCEDURE — 81001 URINALYSIS AUTO W/SCOPE: CPT

## 2019-07-30 PROCEDURE — 80048 BASIC METABOLIC PNL TOTAL CA: CPT

## 2019-07-30 PROCEDURE — 82962 GLUCOSE BLOOD TEST: CPT

## 2019-07-30 PROCEDURE — 70549 MR ANGIOGRAPH NECK W/O&W/DYE: CPT

## 2019-07-30 PROCEDURE — 82550 ASSAY OF CK (CPK): CPT

## 2019-07-30 PROCEDURE — 36415 COLL VENOUS BLD VENIPUNCTURE: CPT

## 2019-07-30 PROCEDURE — 93306 TTE W/DOPPLER COMPLETE: CPT

## 2019-07-30 PROCEDURE — 94760 N-INVAS EAR/PLS OXIMETRY 1: CPT

## 2019-07-30 PROCEDURE — 87086 URINE CULTURE/COLONY COUNT: CPT

## 2019-07-30 PROCEDURE — 85670 THROMBIN TIME PLASMA: CPT

## 2019-07-30 PROCEDURE — 96365 THER/PROPH/DIAG IV INF INIT: CPT

## 2019-07-30 PROCEDURE — 70498 CT ANGIOGRAPHY NECK: CPT

## 2019-07-30 RX ADMIN — HYDRALAZINE HYDROCHLORIDE SCH: 25 TABLET, FILM COATED ORAL at 13:58

## 2019-07-30 RX ADMIN — CLONIDINE HYDROCHLORIDE SCH: 0.2 TABLET ORAL at 13:58

## 2019-07-30 RX ADMIN — SODIUM CHLORIDE SCH MLS/HR: 0.9 INJECTION, SOLUTION INTRAVENOUS at 19:33

## 2019-07-30 RX ADMIN — CLONIDINE HYDROCHLORIDE SCH MG: 0.2 TABLET ORAL at 21:53

## 2019-07-30 RX ADMIN — FAMOTIDINE SCH MG: 10 INJECTION, SOLUTION INTRAVENOUS at 12:27

## 2019-07-30 RX ADMIN — HYDRALAZINE HYDROCHLORIDE SCH MG: 25 TABLET, FILM COATED ORAL at 21:53

## 2019-07-30 RX ADMIN — ENOXAPARIN SODIUM SCH MG: 100 INJECTION SUBCUTANEOUS at 12:27

## 2019-07-30 NOTE — CAT SCAN REPORT
CT head/brain wo con 



INDICATION / CLINICAL INFORMATION:

neuro deficits <6hrs or sx present upon awakening.



TECHNIQUE:

All CT scans at this location are performed using CT dose reduction for ALARA by means of automated e
xposure control. 



COMPARISON:

7/26/2019



FINDINGS:

Cerebral atrophy and small vessel disease is present. No mass or mass effect is seen. There is no rolando
dence of intracranial hemorrhage. No obvious new large area of infarction is identified. Visualized p
aranasal sinuses are clear



IMPRESSION:

Diffuse cerebral atrophy and small vessel disease. No acute findings or interval change from 7/26/201
9. This is a code stroke patient and results called to the emergency room at 0523 hours on 7/30/2019



Signer Name: Rishi Haile MD FACR 

Signed: 7/30/2019 6:26 AM

 Workstation Name: [id number]

## 2019-07-30 NOTE — EMERGENCY DEPARTMENT REPORT
ED General Adult HPI





- General


Chief complaint: Neuro Symptoms/Deficit


Stated complaint: POSS STROKE


Time Seen by Provider: 07/30/19 06:07


Source: family, EMS (ems notes not available at time of  chart dictation), RN 

notes reviewed, old records reviewed


Mode of arrival: Stretcher


Limitations: Altered Mental Status, Physical Limitation





- History of Present Illness


Initial comments: 





Primary care Dr.: Dr. Martin Whitmore





Past medical history:





Dementia, COPD, hemorrhagic stroke, question ischemic stroke





This is a 68-year-old female who is brought to the hospital by family or 

weakness and altered mental status and confusion.  Apparently, her last well gume

e is at 11:00 yesterday evening.  Family indicates that they put the patient on 

the toilet, and then when he went to go back to get her, she was stuck.  There 

was questionable lateral weakness, although family cannot recall which side.  

Apparently, the patient had not been talking, and family had fallen asleep after

bringing her to the bathroom, and they were not quite sure what time they had 

representative to the bathroom to see the patient.





In the emergency room, the patient was seen and evaluated by stroke neurology, 

who indicated that the patient was not a TPA candidate.  Furthermore, during the

patient's evaluation, she was noted to be actively resisting range of motion in 

her left arm and left leg.  CT scan of the brain was negative for acute disease,

and patient is currently in MRI at this point in time.








Patient was loaded with Keppra.








The patient is currently altered, and not speaking to this provider either, 

therefore, she is not able to describe exacerbating or relieving factors, 

qualitative nature of her symptoms.





As per her family, she has chronic dementia, and was supposed to follow up later

on today with outpatient case management and physical therapy.  Since her 

previous evaluation 5 days ago, there is been no episodes of syncope, nausea, 

vomiting, fevers or chills.





There are no new medicines of the family is aware.  The patient was seen in this

department 5 days ago by this provider for increasing frequency of falls, and 

worsening confusion.


-: unknown


Radiation: other


Quality: other


Consistency: other


Improves with: other


Worsens with: other





- Related Data


                                  Previous Rx's











 Medication  Instructions  Recorded  Last Taken  Type


 


cloNIDine [Catapres] 0.2 mg PO TID #30 tablet 09/22/15 2 Days Ago Rx





   ~09/16/16 





   0.2 


 


hydrALAZINE [Apresoline TAB] 50 mg PO Q8HR #90 tablet 09/22/15 1 Day Ago Rx





   ~09/17/16 





   50 


 


ALBUTEROL Inhaler (OR & NICU) 2 puff IH QID PRN #1 inhalation 02/13/17 Unknown 

Rx





[ProAir HFA Inhaler]    


 


Azithromycin [Zithromax Z-MAGEN] 250 mg PO DAILY #6 tab 02/13/17 Unknown Rx


 


Fluticasone [Flonase] 1 spray NS QDAY #1 bottle 02/13/17 Unknown Rx


 


predniSONE [Deltasone] 50 mg PO QDAY #6 tab 02/13/17 Unknown Rx


 


Ondansetron [Zofran Odt] 4 mg PO Q8HR PRN #14 tab.rapdis 12/11/17 Unknown Rx


 


traMADol [Ultram 50 MG tab] 50 mg PO Q4HR PRN #14 tablet 12/11/17 Unknown Rx


 


Albuterol Sulfate [Proair 90 mcg IH Q4HR PRN #2 aer.pow.ba 07/26/19 Unknown Rx





Respiclick]    











                                    Allergies











Allergy/AdvReac Type Severity Reaction Status Date / Time


 


No Known Allergies Allergy   Verified 09/18/15 07:34














ED Review of Systems


ROS: 


Stated complaint: POSS STROKE


Other details as noted in HPI





Comment: ros per family


Constitutional: malaise


Neurological: weakness, confusion





ED Past Medical Hx





- Past Medical History


Hx Hypertension: Yes


Hx CVA: Yes (hemorragic cva 9/2015)


Hx COPD: Yes


Hx Dementia: No


Hx HIV: No


Additional medical history: bells palsy, dementia





- Social History


Smoking Status: Never Smoker


Substance Use Type: None





- Medications


Home Medications: 


                                Home Medications











 Medication  Instructions  Recorded  Confirmed  Last Taken  Type


 


cloNIDine [Catapres] 0.2 mg PO TID #30 tablet 09/22/15 09/18/16 2 Days Ago Rx





    ~09/16/16 





    0.2 


 


hydrALAZINE [Apresoline TAB] 50 mg PO Q8HR #90 tablet 09/22/15 09/18/16 1 Day 

Ago Rx





    ~09/17/16 





    50 


 


ALBUTEROL Inhaler (OR & NICU) 2 puff IH QID PRN #1 inhalation 02/13/17  Unknown 

Rx





[ProAir HFA Inhaler]     


 


Azithromycin [Zithromax Z-MAGEN] 250 mg PO DAILY #6 tab 02/13/17  Unknown Rx


 


Fluticasone [Flonase] 1 spray NS QDAY #1 bottle 02/13/17  Unknown Rx


 


predniSONE [Deltasone] 50 mg PO QDAY #6 tab 02/13/17  Unknown Rx


 


Ondansetron [Zofran Odt] 4 mg PO Q8HR PRN #14 tab.rapdis 12/11/17  Unknown Rx


 


traMADol [Ultram 50 MG tab] 50 mg PO Q4HR PRN #14 tablet 12/11/17  Unknown Rx


 


Albuterol Sulfate [Proair 90 mcg IH Q4HR PRN #2 aer.pow.ba 07/26/19  Unknown Rx





Respiclick]     














ED Physical Exam





- General


Limitations: Altered Mental Status, Physical Limitation, Other


General appearance: other (patient is listless, responds to stimuli)





- Head


Head exam: Present: atraumatic, normocephalic





- Eye


Eye exam: Present: normal appearance, PERRL





- ENT


ENT exam: Present: normal exam, normal orophraynx, mucous membranes moist, other





- Neck


Neck exam: Present: normal inspection, full ROM.  Absent: tenderness, 

meningismus





- Respiratory


Respiratory exam: Present: decreased breath sounds.  Absent: respiratory 

distress, wheezes, rales, rhonchi, stridor





- Cardiovascular


Cardiovascular Exam: Present: regular rate, normal rhythm.  Absent: systolic 

murmur, diastolic murmur, rubs, gallop





- GI/Abdominal


GI/Abdominal exam: Present: soft.  Absent: distended, tenderness, guarding, 

rebound, rigid, pulsatile mass





- Extremities Exam


Extremities exam: Present: normal inspection, other (2+ pulses noted in the 

bilateral upper, lower extremities.  Compartments soft.  No long bony 

tenderness.  The pelvis is stable.).  Absent: calf tenderness





- Back Exam


Back exam: Present: normal inspection.  Absent: tenderness, CVA tenderness (R), 

paraspinal tenderness, vertebral tenderness





- Neurological Exam


Neurological exam: Present: altered (detailed neurologic examination not 

possible secondary to altered mental status.), other (patient moves 4 extrem

ities in response to pinch.  When passively ranging her left arm, the patient 

actively resists range of motion, and is noted to be engaging left deltoid, left

bicep and left tricep.  There is no obvious facial droop.  The patient is 

nonverbal.)





- Psychiatric


Psychiatric exam: Present: other (the patient is nonverbal)





- Skin


Skin exam: Present: warm, dry, intact, normal color.  Absent: rash





ED Course


                                   Vital Signs











  07/30/19 07/30/19 07/30/19





  05:30 06:30 06:41


 


Temperature   99.5 F


 


Pulse Rate 97 H 92 H 


 


Respiratory 21 14 





Rate   


 


Blood Pressure 165/98 157/99 


 


Blood Pressure   





[Left]   


 


O2 Sat by Pulse 95 95 





Oximetry   














  07/30/19 07/30/19 07/30/19





  07:00 07:45 08:00


 


Temperature   


 


Pulse Rate 96 H 90 98 H


 


Respiratory 18 22 14





Rate   


 


Blood Pressure 155/90 151/92 152/93


 


Blood Pressure   





[Left]   


 


O2 Sat by Pulse 91 90 93





Oximetry   














  07/30/19 07/30/19 07/30/19





  10:10 10:15 10:46


 


Temperature   


 


Pulse Rate 81 77 83


 


Respiratory 13 13 18





Rate   


 


Blood Pressure 151/103 155/85 


 


Blood Pressure   158/96





[Left]   


 


O2 Sat by Pulse 94 96 96





Oximetry   














- Reevaluation(s)


Reevaluation #1: 





07/30/19 11:36


No large vessel occlusion is noted





ED Medical Decision Making





- Lab Data


Result diagrams: 


                                 07/30/19 05:32





                                 07/30/19 05:32








                                   Vital Signs











  07/30/19 07/30/19 07/30/19





  05:30 06:30 06:41


 


Temperature   99.5 F


 


Pulse Rate 97 H 92 H 


 


Respiratory 21 14 





Rate   


 


Blood Pressure 165/98 157/99 


 


O2 Sat by Pulse 95 95 





Oximetry   











                                   Lab Results











  07/30/19 07/30/19 07/30/19 Range/Units





  05:32 05:32 05:32 


 


WBC  8.9    (4.5-11.0)  K/mm3


 


RBC  4.92    (3.65-5.03)  M/mm3


 


Hgb  15.3 H    (10.1-14.3)  gm/dl


 


Hct  46.0 H    (30.3-42.9)  %


 


MCV  94    (79-97)  fl


 


MCH  31    (28-32)  pg


 


MCHC  33    (30-34)  %


 


RDW  14.4    (13.2-15.2)  %


 


Plt Count  214    (140-440)  K/mm3


 


Lymph % (Auto)  8.5 L    (13.4-35.0)  %


 


Mono % (Auto)  2.5    (0.0-7.3)  %


 


Eos % (Auto)  0.2    (0.0-4.3)  %


 


Baso % (Auto)  0.8    (0.0-1.8)  %


 


Lymph #  0.8 L    (1.2-5.4)  K/mm3


 


Mono #  0.2    (0.0-0.8)  K/mm3


 


Eos #  0.0    (0.0-0.4)  K/mm3


 


Baso #  0.1    (0.0-0.1)  K/mm3


 


Seg Neutrophils %  88.0 H    (40.0-70.0)  %


 


Seg Neutrophils #  7.9 H    (1.8-7.7)  K/mm3


 


PT   13.1   (12.2-14.9)  Sec.


 


INR   1.02   (0.87-1.13)  


 


APTT   26.4   (24.2-36.6)  Sec.


 


Thrombin Time   18.0   (15.1-19.6)  Sec.


 


Sodium    138  (137-145)  mmol/L


 


Potassium    4.0  (3.6-5.0)  mmol/L


 


Chloride    101.5  ()  mmol/L


 


Carbon Dioxide    28  (22-30)  mmol/L


 


Anion Gap    13  mmol/L


 


BUN    11  (7-17)  mg/dL


 


Creatinine    0.6 L  (0.7-1.2)  mg/dL


 


Estimated GFR    > 60  ml/min


 


BUN/Creatinine Ratio    18  %


 


Glucose    190 H  ()  mg/dL


 


Calcium    8.7  (8.4-10.2)  mg/dL


 


Magnesium     (1.7-2.3)  mg/dL


 


Total Creatine Kinase     ()  units/L


 


Troponin T    < 0.010  (0.00-0.029)  ng/mL














  07/30/19 Range/Units





  05:32 


 


WBC   (4.5-11.0)  K/mm3


 


RBC   (3.65-5.03)  M/mm3


 


Hgb   (10.1-14.3)  gm/dl


 


Hct   (30.3-42.9)  %


 


MCV   (79-97)  fl


 


MCH   (28-32)  pg


 


MCHC   (30-34)  %


 


RDW   (13.2-15.2)  %


 


Plt Count   (140-440)  K/mm3


 


Lymph % (Auto)   (13.4-35.0)  %


 


Mono % (Auto)   (0.0-7.3)  %


 


Eos % (Auto)   (0.0-4.3)  %


 


Baso % (Auto)   (0.0-1.8)  %


 


Lymph #   (1.2-5.4)  K/mm3


 


Mono #   (0.0-0.8)  K/mm3


 


Eos #   (0.0-0.4)  K/mm3


 


Baso #   (0.0-0.1)  K/mm3


 


Seg Neutrophils %   (40.0-70.0)  %


 


Seg Neutrophils #   (1.8-7.7)  K/mm3


 


PT   (12.2-14.9)  Sec.


 


INR   (0.87-1.13)  


 


APTT   (24.2-36.6)  Sec.


 


Thrombin Time   (15.1-19.6)  Sec.


 


Sodium   (137-145)  mmol/L


 


Potassium   (3.6-5.0)  mmol/L


 


Chloride   ()  mmol/L


 


Carbon Dioxide   (22-30)  mmol/L


 


Anion Gap   mmol/L


 


BUN   (7-17)  mg/dL


 


Creatinine   (0.7-1.2)  mg/dL


 


Estimated GFR   ml/min


 


BUN/Creatinine Ratio   %


 


Glucose   ()  mg/dL


 


Calcium   (8.4-10.2)  mg/dL


 


Magnesium  2.00  (1.7-2.3)  mg/dL


 


Total Creatine Kinase  919 H  ()  units/L


 


Troponin T   (0.00-0.029)  ng/mL














- EKG Data


-: EKG Interpreted by Me


EKG shows normal: sinus rhythm


Rate: normal





- EKG Data





07/30/19 09:09


EKG today shows a sinus rhythm, 94 bpm, left axis deviation, low voltage, motion

 artifact, poor R progression, QTC prolonged, the EKG is abnormal, EKG appears 

to be unchanged from prior EKG from 07/26/2019.





- Radiology Data


Radiology results: pending, report reviewed, image reviewed





Noncontrast CT scan of the brain is negative for acute disease.  Chronic 

findings are noted.











- Medical Decision Making





Differential diagnosis, including but not limited to: Seizure, postictal state, 

TIA, subacute stroke, pneumonia, urinary tract infection, electrolyte 

derangement





Assessment and plan: 68-year-old female presenting with a last known well time 

11:00 last night, presents to the ER today at 5:00 AM with nonspecific 

alteration in mental status, and refusing to move her left arm.  Her mental 

status today is diminished compared to my prior evaluation.  The patient 

presents more than 4.5 hours after her last known well time, and is therefore 

not a TPA candidate.





Exam does not appear to be consistent with a stroke, as she moves 4 extremities 

in response to pinch and painful stimuli, and she is actively resisting range of

 motion in her left arm.  Nevertheless, angiographic imaging is recommended by 

stroke neurology, Dr. JAKE Call





Currently, the CT angiogram is down because of scheduled maintenance, therefore,

 the patient is an MRI, as recommended by stroke neurology.  Screening 

laboratory studies reviewed and appreciated.  Urinalysis and x-ray of the chest 

from a few days ago were not consistent with infectious etiology.

















The hospital physician, Dr. Kocherla will admit patient to the medical service, 

this is very unlikely to be a large vessel occlusion, or basilar thrombus.  MR 

demonstrates basilar thrombus, we will discuss with interventional 

neuroradiology to determine appropriate plan of care.











I had extensive discussion with the patient's family, to discuss goals of care, 

advanced directives.  Currently, the patient is presumably full code, however, 

family and I talked about complications of CPR and intubation, and they're 

considering advanced directives








Will defer to the inpatient team to follow-up the urinalysis.


Critical care attestation.: 


If time is entered above; I have spent that time in minutes in the direct care 

of this critically ill patient, excluding procedure time.








ED Disposition


Clinical Impression: 


 Altered mental status, unspecified, Dementia





Disposition: DC-09 OP ADMIT IP TO THIS HOSP


Is pt being admited?: Yes


Does the pt Need Aspirin: Yes


Condition: Fair

## 2019-07-30 NOTE — HISTORY AND PHYSICAL REPORT
History of Present Illness


Date of examination: 07/30/19


Date of admission: 


07/30/19 09:13





Chief complaint: 


Weakness, witnessed seizure and confusion








History of present illness: 


68-year-old female patient with significant history of dementia and recurrent 

falls COPD presented to emergency room with generalized weakness and confusion. 

Family also reports witnessed seizure like activity, patient was evaluated by 

the neurologist , who indicated that patient was not a candidate for TPA.  CT 

brain without contrast was negative for acute abnormality, Extensive neuro 

workup is in progress.


When I evaluated the patient, patient was closing eyes, opening on verbal c

ommands, noncommunicative, not in acute distress


Patient has past medical history of hemorrhagic CVA, dementia and COPD








Past History


Past Medical History: COPD, hypertension, other (Dementia)


Past Surgical History: No surgical history


Social history: denies: smoking, alcohol abuse


Family history: hypertension





Medications and Allergies


                                    Allergies











Allergy/AdvReac Type Severity Reaction Status Date / Time


 


No Known Allergies Allergy   Verified 09/18/15 07:34











                                Home Medications











 Medication  Instructions  Recorded  Confirmed  Last Taken  Type


 


Atorvastatin [Lipitor Tab] 80 mg PO DAILY 07/30/19 07/30/19 07/28/19 History





    80 mg 


 


amLODIPine [Norvasc] 5 mg PO DAILY MDD 5 mg 07/30/19 07/30/19 07/28/19 History





    5 mg 














Review of Systems


ROS unobtainable: due to mental status





Exam





- Constitutional


Vitals: 


                                        











Temp Pulse Resp BP Pulse Ox


 


 99.5 F   83   18   158/96   96 


 


 07/30/19 06:41  07/30/19 10:46  07/30/19 10:46  07/30/19 10:46  07/30/19 10:46











General appearance: Present: mild distress, other (noncommunicative)





- EENT


Eyes: Present: PERRL


ENT: other (normal exam)





- Neck


Neck: Present: supple.  Absent: enlarged thyroid





- Respiratory


Respiratory effort: normal


Respiratory: bilateral: diminished, negative: rales, rhonchi, wheezing





- Cardiovascular


Rhythm: regular


Heart Sounds: Present: S1 & S2.  Absent: systolic murmur, diastolic murmur





- Extremities


Extremities: no ischemia, No edema





- Abdominal


General gastrointestinal: Present: soft, non-tender





- Integumentary


Integumentary: Present: clear, warm





- Musculoskeletal


Musculoskeletal: generalized weakness





- Psychiatric


Psychiatric: other (noncommunicative)





- Neurologic


Neurologic: other (noncommunicative)





Results





- Labs


CBC & Chem 7: 


                                 07/30/19 05:32





                                 07/30/19 05:32


Labs: 


                              Abnormal lab results











  07/30/19 07/30/19 07/30/19 Range/Units





  05:32 05:32 05:32 


 


Hgb  15.3 H    (10.1-14.3)  gm/dl


 


Hct  46.0 H    (30.3-42.9)  %


 


Lymph % (Auto)  8.5 L    (13.4-35.0)  %


 


Lymph #  0.8 L    (1.2-5.4)  K/mm3


 


Seg Neutrophils %  88.0 H    (40.0-70.0)  %


 


Seg Neutrophils #  7.9 H    (1.8-7.7)  K/mm3


 


Creatinine   0.6 L   (0.7-1.2)  mg/dL


 


Glucose   190 H   ()  mg/dL


 


Total Creatine Kinase    919 H  ()  units/L














Assessment and Plan





- Patient Problems


(1) Acute metabolic encephalopathy


Current Visit: Yes   Status: Acute   


Plan to address problem: 


Multifactorial, probably secondary to seizure, CVA, dementia


Underlying disease process, supportive care








(2) Acute CVA (cerebrovascular accident)


Current Visit: Yes   Status: Acute   


Plan to address problem: 


Not a candidate for TPA, neuro workup is in progress


Follow MRI/MRA carotid Doppler CTA head and neck


Aspirin and statin, PT OT ST


Rehabilitation, neurology consult








(3) Dementia


Current Visit: Yes   Status: Acute   


Plan to address problem: 


Supportive care, neurology consult if needed








(4) Hypertension


Onset Date: 09/18/15   Current Visit: No   Status: Chronic   


Qualifiers: 


   Hypertension type: essential hypertension   Qualified Code(s): I10 - 

Essential (primary) hypertension   


Plan to address problem: 


Closely monitor blood pressures, permissive hypertension per stroke protocol


When necessary hydralazine








(5) Seizures


Current Visit: Yes   Status: Acute   


Plan to address problem: 


Witnessed seizure by family;


Seizure precautions, antiepileptic medications, Ativan as needed


Follow neuro workup, neurology consult








(6) DVT prophylaxis


Current Visit: Yes   Status: Acute   


Plan to address problem: 


Lovenox








(7) Full code status


Current Visit: Yes   Status: Acute

## 2019-07-30 NOTE — VASCULAR LAB REPORT
BILATERAL CAROTID DOPPLER ULTRASOUND



INDICATION : cva



TECHNIQUE:  Grayscale and color Doppler imaging performed through the neck.



COMPARISON:  None



FINDINGS:



Right:   There is minimal partially calcified plaque in the proximal right ICA.. Peak systolic veloci
ty in the CCA is 67 cm/s with end-diastolic velocity of 12 cm/s. Peak systolic velocity in the proxim
al ICA is 63 cm/s with end-diastolic velocity of 11 cm/s. ICA to CCA ratio is less than 2. There is a
ntegrade  flow in the ECA and the vertebral artery. 



Left: There is minimal partially calcified plaque in the left carotid bulb.. Peak systolic velocity i
n the CCA is 95 cm/s with end-diastolic velocity of 17 cm/s. Peak systolic velocity in the proximal I
CA is 62 cm/s with end-diastolic velocity of 11 cm/s. ICA to CCA ratio is less than 2. There is anteg
rade  flow in the ECA and the vertebral artery. 



IMPRESSION: No hemodynamically significant stenosis by NASCET criteria.



Signer Name: Kyle Vera Jr, MD 

Signed: 7/30/2019 3:24 PM

 Workstation Name: [id number]

## 2019-07-30 NOTE — MAGNETIC RESONANCE REPORT
MR MRA/MRV neck wo/w con



INDICATION / CLINICAL INFORMATION:

68 years Female; tia vs sz.



TECHNIQUE: 2-D time of flight performed prior to contrast. 3-D evaluation performed following IV cont
rast administration. NASCET criteria used for stenosis evaluation.



COMPARISON:

None available.



FINDINGS: 



ARCH: Normal aortic arch branching suggested.



CAROTID ARTERIES: The visualized common and internal carotid arteries are widely patent.



VERTEBRAL ARTERIES: Codominant vertebral system seen. Short segment narrowing is seen at the origin o
f the left vertebral artery-moderate in degree. Mild, short segment narrowing seen in the left verteb
ral artery where it begins to pass around the arch of C1.



IMPRESSION: Narrowing seen in the left vertebral artery, as described above.



Signer Name: Richard Herman MD, III 

Signed: 7/30/2019 11:21 AM

 Workstation Name: [id number]

## 2019-07-30 NOTE — MAGNETIC RESONANCE REPORT
MR brain wo con



INDICATION / CLINICAL INFORMATION:

68 years Female; tia vs sz.



TECHNIQUE: 

Multiplanar, multisequence MR images of the brain were obtained.



COMPARISON: 

CT - 7/30/2019



FINDINGS:



BRAIN / INTRACRANIAL CONTENTS: Small focus of increased diffusion signal seen in the white matter of 
the left occipital lobe-this finding is not positive on ADC map. Findings may be related to T2 shine 
through artifact from significant white matter disease. 



Moderate cerebral and mild cerebellar atrophy. There may be an old area of hemorrhage in the left bobby
gliocapsular region, near the head of the left caudate.



There are extensive, confluent areas of increased signal intensity on FLAIR imaging in the white vince
er of the cerebral hemispheres. These are nonspecific findings and may be related to microangiopathy 
(hypertension, diabetes, atherosclerosis), given the patient's age.  Significant pontine disease note
d. Etiologies such as CADASILs, radiation therapy, Binswanger's etc. might be considered as well.



Gradient echo imaging demonstrates multiple foci of decreased T2 signal intensity seen throughout the
 basal ganglia and cerebral hemispheres. Findings are also seen in the left cerebellar hemisphere. Am
yloid angiopathy, hypertensive microhemorrhages, multiple cavernous malformations, etc. may be consid
ered.



Otherwise, no acute ischemia, acute hemorrhage, or hydrocephalus. 



CRANIOCERVICAL JUNCTION: No significant abnormality.

VASCULAR FLOW-VOIDS: No significant abnormality.



ORBITS: No significant abnormality of visualized orbits.

SINUSES / MASTOIDS: There is complete opacification of the mastoid air cells on the right. Mild mucos
al thickening seen on the left. Mild mucosal thickening also noted in the ethmoids.



ADDITIONAL FINDINGS: None. 



IMPRESSION:

1. No focal mass, acute hemorrhage, hydrocephalus, or acute ischemia.

2. Significant white matter disease as described above.



Signer Name: Richard Herman MD, III 

Signed: 7/30/2019 11:28 AM

 Workstation Name: [id number]

## 2019-07-30 NOTE — EMERGENCY DEPARTMENT REPORT
ED Neuro Deficit HPI





- General


Stated Complaint: POSS STROKE





- History of Present Illness


Initial Comments: 





[facility]


[facility]








Date of Service:   07/30/2019 04:59:29





Impression:





      Posterior Circulation





Metrics:


Last Known Well: 07/29/2019 23:00:00


Start Time: 07/30/2019 04:58:09


Arrival Time: 07/30/2019 04:57:00


Stamp Time: 07/30/2019 04:59:29


Time First Login Attempt: 07/30/2019 05:02:28


Video Start Time: 07/30/2019 05:02:28





Symptoms: weakness and confusion


NIHSS Start Assessment Time: 07/30/2019 05:25:00


Patient is not a candidate for tPA.


Patient was not deemed candidate for tPA thrombolytics because of Last Well K

nown above 4.5 hours.


Video End Time: 07/30/2019 05:34:38





CT head was reviewd and no acute process seen.





Advanced imaging CTA head and neck ordered but not able to be obtained at this 

time.





ER physician notified of the decision on thrombolytics management.





Comments:


Decreased mental status and generalized weakness with increased tone in the elft

upper extremity, Would consider seizure vs basilar thrombus. CTA ordered but the

CT scanner is down so will treat as seizure and see if she improves and get CTA 

when able to.





Our recommendations are outlined below.





Recommendations:


      Initiate Aspirin 81 MG Daily





Recommended Scan:


     MRI Head Without Contrast


     MRA Neck with and Without Contrast


     Echocardiogram - Transthoracic Echocardiogram





Lipid Panel to Be Obtained, if Not Done in the Last Three Months





Therapies:


      Physical Therapy, Occupational Therapy, Speech Therapy Assessment When 

Applicable





Dysphaghia Screen:


      Swallow Evaluation, Bedside





DVT prophylaxis:


      SCDs, Pneumatic Compression


      Load with Keppra 1000mg





Disposition:


      Follow up with Teleneurology Follow up





Sign Out:


      Discussed with Emergency Department Provider











------------------------------------------------------------------------------





History of Present Illness:


Patient is a 68 years old Female who presents with symptoms of weakness and 

confusion





67 yo F with history of dementia and stroke who is presenting with weakness and 

confusion. Her daughter put her on the toilet at 23:00 and then when she went 

back to get her she was stuck. She appeared to be weak on one side but her 

daughter can't remember which side. She then wasn't talking so she called EMS. 

She had fallen asleep after bringing her to the bathroom and does not remember 

what time it was she went back.





Stroke alert called for EMS presentations





Examination:


1A: Level of Consciousness - Requires repeated stimulation to arouse + 2


1B: Ask Month and Age - Aphasic + 2


1C: Blink Eyes & Squeeze Hands - Performs 0 Tasks + 2


2: Test Horizontal Extraocular Movements - Normal + 0


3: Test Visual Fields - No Visual Loss + 0


4: Test Facial Palsy (Use Grimace if Obtunded) - Normal symmetry + 0


5A: Test Left Arm Motor Drift - No Effort Against Gravity + 3


5B: Test Right Arm Motor Drift - Drift, hits bed + 2


6A: Test Left Leg Motor Drift - Some Effort Against Gravity + 2


6B: Test Right Leg Motor Drift - Some Effort Against Gravity + 2


7: Test Limb Ataxia (FNF/Heel-Shin) - No Ataxia + 0


8: Test Sensation - Normal; No sensory loss + 0


9: Test Language/Aphasia - Mute/Global Aphasia: No Usable Speech/Auditory 

Comprehension + 3


10: Test Dysarthria - Mute/Anarthric + 2


11: Test Extinction/Inattention - No abnormality + 0





NIHSS Score: 20





Patient was informed the Neurology Consult would happen via TeleHealth consult 

by way of interactive audio and video telecommunications and consented to 

receiving care in this manner.





Due to the immediate potential for life-threatening deterioration due to 

underlying acute neurologic illness, I spent 35 minutes providing critical care.

This time includes time for face to face visit via telemedicine, review of 

medical records, imaging studies and discussion of findings with providers, the 

patient and/or family.








Dr Betsy Call








[facility]


[phone]





- Related Data


Home Medications: 


                                  Previous Rx's











 Medication  Instructions  Recorded  Last Taken  Type


 


cloNIDine [Catapres] 0.2 mg PO TID #30 tablet 09/22/15 2 Days Ago Rx





   ~09/16/16 





   0.2 


 


hydrALAZINE [Apresoline TAB] 50 mg PO Q8HR #90 tablet 09/22/15 1 Day Ago Rx





   ~09/17/16 





   50 


 


ALBUTEROL Inhaler (OR & NICU) 2 puff IH QID PRN #1 inhalation 02/13/17 Unknown 

Rx





[ProAir HFA Inhaler]    


 


Azithromycin [Zithromax Z-MAGEN] 250 mg PO DAILY #6 tab 02/13/17 Unknown Rx


 


Fluticasone [Flonase] 1 spray NS QDAY #1 bottle 02/13/17 Unknown Rx


 


predniSONE [Deltasone] 50 mg PO QDAY #6 tab 02/13/17 Unknown Rx


 


Ondansetron [Zofran Odt] 4 mg PO Q8HR PRN #14 tab.rapdis 12/11/17 Unknown Rx


 


traMADol [Ultram 50 MG tab] 50 mg PO Q4HR PRN #14 tablet 12/11/17 Unknown Rx


 


Albuterol Sulfate [Proair 90 mcg IH Q4HR PRN #2 aer.pow.ba 07/26/19 Unknown Rx





[facility]]    











Allergies/Adverse Reactions: 


                                    Allergies











Allergy/AdvReac Type Severity Reaction Status Date / Time


 


No Known Allergies Allergy   Verified 09/18/15 07:34














ED Review of Systems


ROS: 


Stated complaint: POSS STROKE


Other details as noted in HPI








ED Past Medical Hx





- Past Medical History


Hx Hypertension: Yes


Hx CVA: Yes (hemorragic cva 9/2015)


Hx COPD: Yes


Hx Dementia: No


Hx HIV: No


Additional medical history: bells palsy, dementia





- Social History


Smoking Status: Never Smoker


Substance Use Type: None





- Medications


Home Medications: 


                                Home Medications











 Medication  Instructions  Recorded  Confirmed  Last Taken  Type


 


cloNIDine [Catapres] 0.2 mg PO TID #30 tablet 09/22/15 09/18/16 2 Days Ago Rx





    ~09/16/16 





    0.2 


 


hydrALAZINE [Apresoline TAB] 50 mg PO Q8HR #90 tablet 09/22/15 09/18/16 1 Day 

Ago Rx





    ~09/17/16 





    50 


 


ALBUTEROL Inhaler (OR & NICU) 2 puff IH QID PRN #1 inhalation 02/13/17  Unknown 

Rx





[ProAir HFA Inhaler]     


 


Azithromycin [Zithromax Z-MAGEN] 250 mg PO DAILY #6 tab 02/13/17  Unknown Rx


 


Fluticasone [Flonase] 1 spray NS QDAY #1 bottle 02/13/17  Unknown Rx


 


predniSONE [Deltasone] 50 mg PO QDAY #6 tab 02/13/17  Unknown Rx


 


Ondansetron [Zofran Odt] 4 mg PO Q8HR PRN #14 tab.rapdis 12/11/17  Unknown Rx


 


traMADol [Ultram 50 MG tab] 50 mg PO Q4HR PRN #14 tablet 12/11/17  Unknown Rx


 


Albuterol Sulfate [Proair 90 mcg IH Q4HR PRN #2 aer.pow.ba 07/26/19  Unknown Rx





[facility]]     














ED Neuro Physical Exam





- General


Suspected Stroke: Yes





- NIHSS


Assessment Interval: Baseline


1a. Level of Consciousness: resp stimuli/obtunded


1b. LOC Questions: answers no questions correctly


1c. LOC Commands: performs no tasks correctly


2. Best Gaze: normal


3. Visual: no visual loss


4. Facial Palsy: normal symmetrical movement


5b. Motor Arm Right: some gravity effort


5a. Motor Arm Left: no gravity effort


6a. Motor Leg Left: some gravity effort


6b. Motor Leg Right: some gravity effort


7. Limb Ataxia: absent


8. Sensory: normal


9. Best Language: mute/global aphasia


10. Dysarthria: severe dysarthria


11. Extinction/Inattention: no abnormality


Total Score: 20


Stroke Severity: Moderate to Severe Stroke


Critical care attestation.: 


If time is entered above; I have spent that time in minutes in the direct care 

of this critically ill patient, excluding procedure time.








ED Disposition


Clinical Impression: 


 Altered mental status, unspecified





Disposition: [id number] OP ADMIT IP TO THIS HOSP


Is pt being admited?: Yes


Condition: Stable

## 2019-07-30 NOTE — XRAY REPORT
CHEST 1 VIEW 



INDICATION:  Altered mental status, weakness.



COMPARISON:  7/26/2019



FINDINGS:

Support devices: None.



Heart: Heart size is borderline. The aorta is mildly ectatic with calcifications. 

Lungs/Pleura: The lungs are generally clear. There is minor focal scarring in the lingula. Mild centr
al pulmonary venous congestion is also identified. No pleural effusion or pneumothorax. 



Additional findings: None.



IMPRESSION:

 Borderline heart size. Mild central pulmonary venous congestion.



Signer Name: Kyle Vera Jr, MD 

Signed: 7/30/2019 10:59 AM

 Workstation Name: [id number]

## 2019-07-31 RX ADMIN — LEVETIRACETAM SCH MG: 500 SOLUTION ORAL at 12:47

## 2019-07-31 RX ADMIN — ENOXAPARIN SODIUM SCH: 100 INJECTION SUBCUTANEOUS at 11:08

## 2019-07-31 RX ADMIN — HYDRALAZINE HYDROCHLORIDE SCH MG: 25 TABLET, FILM COATED ORAL at 15:33

## 2019-07-31 RX ADMIN — CLONIDINE HYDROCHLORIDE SCH MG: 0.2 TABLET ORAL at 08:38

## 2019-07-31 RX ADMIN — HYDRALAZINE HYDROCHLORIDE SCH MG: 25 TABLET, FILM COATED ORAL at 06:01

## 2019-07-31 RX ADMIN — CLONIDINE HYDROCHLORIDE SCH MG: 0.2 TABLET ORAL at 15:33

## 2019-07-31 RX ADMIN — HYDRALAZINE HYDROCHLORIDE SCH MG: 25 TABLET, FILM COATED ORAL at 21:23

## 2019-07-31 RX ADMIN — SODIUM CHLORIDE SCH MLS/HR: 0.9 INJECTION, SOLUTION INTRAVENOUS at 21:21

## 2019-07-31 RX ADMIN — SODIUM CHLORIDE SCH MLS/HR: 0.9 INJECTION, SOLUTION INTRAVENOUS at 06:01

## 2019-07-31 RX ADMIN — CLONIDINE HYDROCHLORIDE SCH MG: 0.2 TABLET ORAL at 21:23

## 2019-07-31 RX ADMIN — LEVETIRACETAM SCH MG: 500 SOLUTION ORAL at 21:23

## 2019-07-31 RX ADMIN — FAMOTIDINE SCH MG: 10 INJECTION, SOLUTION INTRAVENOUS at 11:13

## 2019-07-31 NOTE — ELECTROENCEPHALOGRAM REPORT
Electroencephalogram EEG


Date of exam: 07/31/19


History: 


focal jerking ?left hand per family then staring at wall, weak left side past 

week, headache





Impression: 


This 20 min digital portable EEG has 19 channels one of which is EKG showing 

occasional PVC, in a 22 min recording.  There is some artifact at F7.  Alpha is 

not clearly seen.  There is triphasic activity bilaterally.  No clear sleep is 

recorded.  Slower right frontotemporal activity at elapsed timestamp 00:06:25 in

middle of page.  No epileptiform activity is seen.





Description: 


The waking background shows an appropriate organization with well-defined 

anterior posterior voltage and frequency gradients. Posteriorly, there is a 

well-developed alpha rhythm of [ ] Hz which is symmetrical and bilaterally 

reactive. Anteriorly, there is a pattern of lower voltage and slightly irregular

theta and beta range frequencies. 





During drowsiness, there is attenuation of the background rhythms.





The sleep background shows normal organization with well-formed sleep spindles 

and vertex waves which are synchronous  and symmetrical.





Throughout, the recording there are no epileptiform abnormalities, focal or 

lateralizing features, or significant interhemispheric findings.








Interpretation: 


Moderately abnormal wake/drowsy due to triphasic waves, indicative of metabolic 

encephalopathy such as hepatic type. Mild slowing on the right should be 

correlated with imaging.  This EEG does not exclude epilepsy of partial onset.  

Up to 4 EEGs over several months may be needed to capture interictal 

epileptiform activity.

## 2019-07-31 NOTE — PROGRESS NOTE
Assessment and Plan


Assessment and plan: 


-- Acute metabolic encephalopathy


Current Visit: Yes   Status: Acute   


Multifactorial, probably secondary to seizure, CVA, dementia


Underlying disease process, supportive care





-- Acute CVA (cerebrovascular accident)


Current Visit: Yes   Status: Acute   


Not a candidate for TPA, neuro workup so far is negativ neg 


sofar , possible TIA, acute CVA ruled out





-- Dementia/memory loss


Current Visit: Yes   Status: Acute   


Supportive care, neurology consult if needed





-- Hypertension


Current Visit:yes   Status: Chronic   


Closely monitor blood pressures, continue current management


When necessary hydralazine





--History of cerebral amyloid angiopathy :


2 episodes of bleeding, discontinue aspirin





--Seizures


Current Visit: Yes   Status: Acute   


Complex partial epilepsy, nonintractable


Seizure precautions,keppra, Ativan as needed  


Neurology evaluation noted and appreciated , 


follow EEG , no driving





--Increasedsomnolence:


obstructive sleep apnea 


outpatient sleep studies  





--DVT prophylaxis


Current Visit: Yes   Status: Acute renal 


Lovenox





--Full code status


Current Visit: Yes   Status: Acute  





Follow rest of the neuro w/u


Physical therapy occupational therapy .


Monitor closely and adjust management as needed


Plan of care is reviewed with the patient's multiple family members 


at the bedside mainly the daughter who is her caretaker





DC planning.  Case management; possible home with home health versus subacute 

rehabilitation


When medically stable 











History


Interval history: 


Patient seen and examined medical records reviewed


As per family patient is more alert and awake, atrial bites of breakfast


Recognized 1-2 family members


No new episodes of seizure


Vital signs noted








Hospitalist Physical





- Constitutional


Vitals: 


                                        











Temp Pulse Resp BP Pulse Ox


 


 98.7 F   84   20   158/87   90 


 


 07/31/19 05:10  07/31/19 06:01  07/31/19 05:10  07/31/19 08:38  07/31/19 05:10











General appearance: Present: no acute distress, other (sleeping easily.awakens)





- EENT


Eyes: Present: PERRL, EOM intact





- Neck


Neck: Present: supple, normal ROM





- Respiratory


Respiratory effort: normal


Respiratory: bilateral: diminished, negative: rales, rhonchi, wheezing





- Cardiovascular


Rhythm: regular


Heart Sounds: Present: S1 & S2





- Extremities


Extremities: no ischemia, No edema





- Abdominal


General gastrointestinal: soft, non-tender, non-distended, normal bowel sounds





- Integumentary


Integumentary: Present: clear, warm





- Psychiatric


Psychiatric: other (sleeping,minimal communication)





- Neurologic


Neurologic: moves all extremities





Results





- Labs


CBC & Chem 7: 


                                 07/30/19 05:32





                                 07/30/19 05:32


Labs: 


                             Laboratory Last Values











WBC  8.9 K/mm3 (4.5-11.0)   07/30/19  05:32    


 


RBC  4.92 M/mm3 (3.65-5.03)   07/30/19  05:32    


 


Hgb  15.3 gm/dl (10.1-14.3)  H  07/30/19  05:32    


 


Hct  46.0 % (30.3-42.9)  H  07/30/19  05:32    


 


MCV  94 fl (79-97)   07/30/19  05:32    


 


MCH  31 pg (28-32)   07/30/19  05:32    


 


MCHC  33 % (30-34)   07/30/19  05:32    


 


RDW  14.4 % (13.2-15.2)   07/30/19  05:32    


 


Plt Count  214 K/mm3 (140-440)   07/30/19  05:32    


 


Lymph % (Auto)  8.5 % (13.4-35.0)  L  07/30/19  05:32    


 


Mono % (Auto)  2.5 % (0.0-7.3)   07/30/19  05:32    


 


Eos % (Auto)  0.2 % (0.0-4.3)   07/30/19  05:32    


 


Baso % (Auto)  0.8 % (0.0-1.8)   07/30/19  05:32    


 


Lymph #  0.8 K/mm3 (1.2-5.4)  L  07/30/19  05:32    


 


Mono #  0.2 K/mm3 (0.0-0.8)   07/30/19  05:32    


 


Eos #  0.0 K/mm3 (0.0-0.4)   07/30/19  05:32    


 


Baso #  0.1 K/mm3 (0.0-0.1)   07/30/19  05:32    


 


Seg Neutrophils %  88.0 % (40.0-70.0)  H  07/30/19  05:32    


 


Seg Neutrophils #  7.9 K/mm3 (1.8-7.7)  H  07/30/19  05:32    


 


PT  13.1 Sec. (12.2-14.9)   07/30/19  05:32    


 


INR  1.02  (0.87-1.13)   07/30/19  05:32    


 


APTT  26.4 Sec. (24.2-36.6)   07/30/19  05:32    


 


  18.0 Sec. (15.1-19.6)   07/30/19  05:32    


 


Sodium  138 mmol/L (137-145)   07/30/19  05:32    


 


Potassium  4.0 mmol/L (3.6-5.0)   07/30/19  05:32    


 


Chloride  101.5 mmol/L ()   07/30/19  05:32    


 


Carbon Dioxide  28 mmol/L (22-30)   07/30/19  05:32    


 


  13 mmol/L  07/30/19  05:32    


 


BUN  11 mg/dL (7-17)   07/30/19  05:32    


 


  0.6 mg/dL (0.7-1.2)  L  07/30/19  05:32    


 


Estimated GFR  > 60 ml/min  07/30/19  05:32    


 


  18 %  07/30/19  05:32    


 


Glucose  190 mg/dL ()  H  07/30/19  05:32    


 


POC Glucose  100  ()   07/30/19  22:07    


 


Calcium  8.7 mg/dL (8.4-10.2)   07/30/19  05:32    


 


Magnesium  2.00 mg/dL (1.7-2.3)   07/30/19  05:32    


 


  919 units/L ()  H  07/30/19  05:32    


 


  < 0.010 ng/mL (0.00-0.029)   07/30/19  05:32    


 


Triglycerides  58 mg/dL (2-149)   07/30/19  05:32    


 


Cholesterol  127 mg/dL ()   07/30/19  05:32    


 


  67 mg/dL ()   07/30/19  05:32    


 


  57 mg/dL (40-59)   07/30/19  05:32    


 


  2.22 %  07/30/19  05:32    


 


  Straw  (Yellow)   07/30/19  Unknown


 


  Clear  (Clear)   07/30/19  Unknown


 


  6.0  (5.0-7.0)   07/30/19  Unknown


 


Ur Specific Gravity  1.009  (1.003-1.030)   07/30/19  Unknown


 


  <15 mg/dl mg/dL (Negative)   07/30/19  Unknown


 


  Neg mg/dL (Negative)   07/30/19  Unknown


 


  Neg mg/dL (Negative)   07/30/19  Unknown


 


  Neg  (Negative)   07/30/19  Unknown


 


  Neg  (Negative)   07/30/19  Unknown


 


  Neg  (Negative)   07/30/19  Unknown


 


  < 2.0 mg/dL (<2.0)   07/30/19  Unknown


 


Ur Leukocyte Esterase  Neg  (Negative)   07/30/19  Unknown


 


  1.0 /HPF (0.0-6.0)   07/30/19  Unknown


 


  1.0 /HPF (0.0-6.0)   07/30/19  Unknown


 


U Epithel Cells (Auto)  2.0 /HPF (0-13.0)   07/30/19  Unknown


 


Amorphous Crystals  Few   07/30/19  Unknown


 


  Few /HPF  07/30/19  Unknown














Active Medications





- Current Medications


Current Medications: 














Generic Name Dose Route Start Last Admin





  Trade Name Freq  PRN Reason Stop Dose Admin


 


Acetaminophen  650 mg  07/30/19 23:07  07/30/19 23:15





  Tylenol  PO   650 mg





  Q4H PRN   Administration





  Pain, Mild (1-3), headache  


 


Albuterol  2.5 mg  07/30/19 11:15 





  Proventil  IH  





  QIDRT PRN  





  Shortness Of Breath  


 


Lipase/Protease/Amylase  1 each  07/30/19 18:38 





  Pancreaze  10,500 Unit  FEEDTUBE  





  PRN PRN  





  For Clogged Feeding Tube  


 


Aspirin  325 mg  07/31/19 10:00 





  Ecotrin  PO  





  QDAY TOMMY  


 


Atorvastatin Calcium  40 mg  07/30/19 22:00  07/30/19 21:53





  Lipitor  PO   40 mg





  QHS TOMMY   Administration


 


Clonidine HCl  0.2 mg  07/30/19 14:00  07/31/19 08:38





  Catapres  PO   0.2 mg





  TID TOMMY   Administration


 


Enoxaparin Sodium  40 mg  07/30/19 12:00  07/30/19 12:27





  Lovenox  SUB-Q   40 mg





  QDAY@1000 TOMMY   Administration


 


Famotidine  20 mg  07/30/19 12:00  07/30/19 12:27





  Pepcid  IV   20 mg





  QDAY TOMMY   Administration


 


Hydralazine HCl  50 mg  07/30/19 14:00  07/31/19 06:01





  Apresoline  PO   50 mg





  Q8HR TOMMY   Administration


 


Hydralazine HCl  10 mg  07/30/19 18:39 





  Apresoline  IV  





  Q4HR PRN  





  Hypertension  


 


Sodium Chloride  1,000 mls @ 100 mls/hr  07/30/19 19:00  07/31/19 06:01





  Nacl 0.9% 1000 Ml  IV   100 mls/hr





  AS DIRECT TOMMY   Administration


 


Labetalol HCl  10 mg  07/30/19 10:56 





  Normodyne  IV  





  Q5MIN PRN  





  to maintain SBP < 180  


 


Simple Syrup  15 ml  07/30/19 18:38 





  Simple Syrup  FEEDTUBE  





  PRN PRN  





  Hypoglycemia  


 


Simple Syrup  30 ml  07/30/19 18:38 





  Simple Syrup  FEEDTUBE  





  PRN PRN  





  Hypoglycemia  


 


Sodium Bicarbonate  325 mg  07/30/19 18:38 





  Sodium Bicarbonate  FEEDTUBE  





  PRN PRN  





  For Clogged Feeding Tube  


 


Sodium Chloride  10 ml  07/30/19 10:56 





  Sodium Chloride Flush Syringe 10 Ml  IV  





  PRN PRN  





  LINE FLUSH

## 2019-07-31 NOTE — CONSULTATION
History of Present Illness


Consult date: 19


Requesting physician: AMY J KOCHERLA


Reason for Consult: seizure, altered mental status


Chief complaint: 





seizure, altered mental status


History of present illness: 


This 68 year old right handed  female per daughter at 4 am was 

on toilet, found later leaning over while on it, stiff all over, jerking she 

thinks left arm.  Placed back in bed kept staring at wall, held head as if 

headache.  Daughter has noted weakness left side for past week.  Hx of stroke 

Oct 2018 with slurring of speech, taken to [facility].  Told some bleeding seen.  In 

 here had bleeding in area of left caudate on CT.  MRI shows slightly 

positive diffusion left radial for occipital horn perhaps T2 shine through since

no ADC change.  Also GRE positive for amyloid peripherally and centrally, 

extensive periventricular disease on FLAIR bilaterally. Has bottle of donezepil 

but not given for past 2 months per daughter since not helping.  Has had 

dementia since her 50's.  No 81 mg aspirin for 2 months.  Takes atorvastatin 20 

mg.  Sometimes given B12 unknown dose, magnesium and B complex at times and shirley

etimes a spinach or cucumber smoothie.  Denies headache or dizziness now.  Echo 

with bubbles negative.  Duplex carotids negative.





PMH:


Medical: hypertension, strokes, hyperlipidemia


Surgeries: none





SH: cigs in past but none for one year, occasional ETOH in past, MJ in past.  

, 2 children, 1 stepson, 4 grandchildren alive and well.  Able to tell 

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





FH: Mother  of cerebral aneurysm and had  hypertension, nephew with CVA and 

DM, sister with hypertension, no epilepsy.





ROS: no headaches or dizziness usually, some snoring but no pauses, dozes off m

uch of day but stays up till 2 am the arises early, daughter plans to try 

melatonin.  No paresthesias.





General Physical Exam:





General appearance: well developed but overweight early 70's  

female in NAD, seen with her daughter.





HEENT: AT/NC, no bruits.  No TMJ click or TMJ or sinus soreness.  


Neck: supple, no bruits.


Heart: no murmur or extra sounds.


Extremities: 2+ DPs, no edema.





Neurologic Exam:


Mental Status: oriented to winter, not month or year or day, gives Pres after D

onald ___, gives  as Esau, delayed responses and sleepy, can't do 3 of 3 

objects task, can't do calculations not even 5+7, can't spell WORLD, abstracts 

ok, no right-left confusion, names pen and tip of pen.


Cranial Nerves: left field cut to threat, left slightly>right pupil but both 

react to L and A, no Lexy's, no facial weakness, Casas midline, positive gags 

but crowded palate, shrugs are 5, tongue protrudes midline.  


Cerebellar: finger to nose dysmetric right>left, heel to shin ok right but poor 

left.


Sensory: intact to primary modalities and DSS.


Motor UEs: left drift but lifts to 90 degrees,  right 4+ and left 4-, Karan 

slow selena left.  


Motor LEs: won't lift legs off bed.  IPs 5 right and 4- left, quads with knee 

supported are 5- right and 4- left, anterior tibials 2+ right and 3+ left 

(apraxic and won't do a steady pull), gastrocs 4+ right and 5- left.


Reflexes: jaw jerk and palmomental are negative, snout is positive.  Triceps, 

biceps and brachioradialis are 2.  Alison's are positive.  Knee jerks are 2+ 

and ankle jerks are 2+ right and 2 left.  Toes are upgoing bilaterally to 

Babinski testing.  








Past History


Past Medical History: COPD, hypertension, other (Dementia)


Past Surgical History: No surgical history


Social history: denies: smoking, alcohol abuse


Family history: hypertension





Medications and Allergies


                                    Allergies











Allergy/AdvReac Type Severity Reaction Status Date / Time


 


No Known Allergies Allergy   Verified 09/18/15 07:34











                                Home Medications











 Medication  Instructions  Recorded  Confirmed  Last Taken  Type


 


Atorvastatin [Lipitor Tab] 80 mg PO DAILY 19 History





    80 mg 


 


amLODIPine [Norvasc] 5 mg PO DAILY MDD 5 mg 19 History





    5 mg 











Active Meds: 


Active Medications





Acetaminophen (Tylenol)  650 mg PO Q4H PRN


   PRN Reason: Pain, Mild (1-3), headache


   Last Admin: 19 23:15 Dose:  650 mg


   Documented by: 


Albuterol (Proventil)  2.5 mg IH QIDRT PRN


   PRN Reason: Shortness Of Breath


Lipase/Protease/Amylase (Pancreaze Dr 10,500 Unit)  1 each FEEDTUBE PRN PRN


   PRN Reason: For Clogged Feeding Tube


Atorvastatin Calcium (Lipitor)  40 mg PO QHS [facility]


   Last Admin: 19 21:53 Dose:  40 mg


   Documented by: 


Clonidine HCl (Catapres)  0.2 mg PO TID [facility]


   Last Admin: 19 15:33 Dose:  0.2 mg


   Documented by: 


Enoxaparin Sodium (Lovenox)  40 mg SUB-Q QDAY@1000 [facility]


   Last Admin: 19 11:08 Dose:  Not Given


   Documented by: 


Famotidine (Pepcid)  20 mg IV QDAY [facility]


   Last Admin: 19 11:13 Dose:  20 mg


   Documented by: 


Hydralazine HCl (Apresoline)  50 mg PO Q8HR [facility]


   Last Admin: 19 15:33 Dose:  50 mg


   Documented by: 


Hydralazine HCl (Apresoline)  10 mg IV Q4HR PRN


   PRN Reason: Hypertension


Sodium Chloride (Nacl 0.9% 1000 Ml)  1,000 mls @ 100 mls/hr IV AS DIRECT [facility]


   Last Admin: 19 06:01 Dose:  100 mls/hr


   Documented by: 


Labetalol HCl (Normodyne)  10 mg IV Q5MIN PRN


   PRN Reason: to maintain SBP < 180


Levetiracetam (Keppra)  1,000 mg PO BID [facility]


   Last Admin: 19 12:47 Dose:  1,000 mg


   Documented by: 


Simple Syrup (Simple Syrup)  15 ml FEEDTUBE PRN PRN


   PRN Reason: Hypoglycemia


Simple Syrup (Simple Syrup)  30 ml FEEDTUBE PRN PRN


   PRN Reason: Hypoglycemia


Sodium Bicarbonate (Sodium Bicarbonate)  325 mg FEEDTUBE PRN PRN


   PRN Reason: For Clogged Feeding Tube


Sodium Chloride (Sodium Chloride Flush Syringe 10 Ml)  10 ml IV PRN PRN


   PRN Reason: LINE FLUSH











Physical Examination





- Vital Signs


Vital Signs: 


                                   Vital Signs











Pulse Resp BP Pulse Ox


 


 97 H  21   165/98   95 


 


 19 05:30  19 05:30  19 05:30  19 05:30














Results





- Laboratory Findings


CBC and BMP: 


                                 19 05:32





                                 19 05:32


Abnormal Lab Findings: 


                                  Abnormal Labs











  19





  05:32 05:32 05:32


 


Hgb  15.3 H  


 


Hct  46.0 H  


 


Lymph % (Auto)  8.5 L  


 


Lymph #  0.8 L  


 


Seg Neutrophils %  88.0 H  


 


Seg Neutrophils #  7.9 H  


 


Creatinine   0.6 L 


 


Glucose   190 H 


 


Hemoglobin A1c   


 


Total Creatine Kinase    919 H














  19





  08:30


 


Hgb 


 


Hct 


 


Lymph % (Auto) 


 


Lymph # 


 


Seg Neutrophils % 


 


Seg Neutrophils # 


 


Creatinine 


 


Glucose 


 


Hemoglobin A1c  6.3 H


 


Total Creatine Kinase 














Assessment and Plan


Impression:


1.  Complex partial epilepsy, nonintractable


2.  Memory loss


3.  Cerebral amyloid angiopathy


4.  Daytime somnolence





Plan:


1.  EEG.


2.  Do not restart donepezil since it could cause seizures.  Could try memantine

 as outpatient.


3.  CTAs of head and neck pending (scanner down).  Could do MRAs of head and 

neck with dye.


4.  Memory labs and ammonia


5.  May need sleep apnea testing as outpatient but unclear she would tolerate 

CPAP.  


6.  Stopped aspirin since she has peripheral amyloid angiopathy in brain and by 

history 2 different episodes of bleeding.





50 min spent including review of hundreds of MRI images.





Thank you for an interesting consultation on this unfortunate early 's lady.  

Signing off, call for any unusual results.  Will provide EEG report later.

## 2019-08-01 LAB
ALBUMIN SERPL-MCNC: 3.1 G/DL (ref 3.9–5)
ALT SERPL-CCNC: 14 UNITS/L (ref 7–56)
BUN SERPL-MCNC: 9 MG/DL (ref 7–17)
BUN/CREAT SERPL: 13 %
CALCIUM SERPL-MCNC: 8.5 MG/DL (ref 8.4–10.2)
HEMOLYSIS INDEX: 41

## 2019-08-01 RX ADMIN — CLONIDINE HYDROCHLORIDE SCH: 0.2 TABLET ORAL at 15:00

## 2019-08-01 RX ADMIN — LEVETIRACETAM SCH MG: 500 SOLUTION ORAL at 23:05

## 2019-08-01 RX ADMIN — SODIUM CHLORIDE SCH MLS/HR: 0.9 INJECTION, SOLUTION INTRAVENOUS at 05:45

## 2019-08-01 RX ADMIN — HYDRALAZINE HYDROCHLORIDE SCH: 25 TABLET, FILM COATED ORAL at 15:04

## 2019-08-01 RX ADMIN — HYDRALAZINE HYDROCHLORIDE SCH MG: 25 TABLET, FILM COATED ORAL at 23:05

## 2019-08-01 RX ADMIN — LEVETIRACETAM SCH MG: 500 SOLUTION ORAL at 10:40

## 2019-08-01 RX ADMIN — ENOXAPARIN SODIUM SCH: 100 INJECTION SUBCUTANEOUS at 11:00

## 2019-08-01 RX ADMIN — SODIUM CHLORIDE SCH MLS/HR: 0.9 INJECTION, SOLUTION INTRAVENOUS at 18:19

## 2019-08-01 RX ADMIN — CLONIDINE HYDROCHLORIDE SCH MG: 0.2 TABLET ORAL at 10:39

## 2019-08-01 RX ADMIN — HYDRALAZINE HYDROCHLORIDE SCH MG: 25 TABLET, FILM COATED ORAL at 05:34

## 2019-08-01 RX ADMIN — FAMOTIDINE SCH MG: 10 INJECTION, SOLUTION INTRAVENOUS at 10:40

## 2019-08-01 NOTE — CAT SCAN REPORT
CTA head with and without IV contrast.



CLINICAL HISTORY: Altered mental status, strokelike symptoms.



Technique: Multiple contiguous postcontrast CT images of the head were obtained at 0.63 mm intervals.
3 plane MIP reconstructions were obtained. Precontrast localizing images were also performed. CT scan
s at this location are performed using the CT dose reduction for [employer] by means of automated exposure
 control.



FINDINGS: There is mild atherosclerotic calcification involving the distal internal carotid arteries 
at. However, there is no significant stenosis by NASCET criteria. The remaining visualized vessels al
so demonstrate appropriate caliber without significant focal stenosis at. The findings correlate with
 the earlier CT demonstrating extensive cerebral white matter disease most consistent with microvascu
lar angiopathy.



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 There is also mild hypoplasia of 
the distal right vertebral artery. However, there is no significant focal narrowing of the vertebral 
basilar system. Furthermore, there is no CT evidence of intracranial aneurysm. There is developmental
 hypoplasia of the left transverse and sigmoid sinuses. There is notable scattered opacification of t
he right mastoid air cells.



IMPRESSION:



There is mild calcification involving distal internal carotid arteries. However, there is no signific
ant focal stenosis of the visualized intracranial vessels.



Signer Name: Siddhartha Reyes MD 

Signed: 8/1/2019 5:39 PM

 Workstation Name: [id number]

## 2019-08-01 NOTE — PROGRESS NOTE
Assessment and Plan


Assessment and plan: 


--Seizures


Current Visit: Yes   Status: Acute   


Complex partial epilepsy, nonintractable


Seizure precautions,keppra, Ativan as needed  


Neurology evaluation noted and appreciated , 


follow EEG , no driving





-- Acute metabolic encephalopathy


Current Visit: Yes   Status: Acute   


Multifactorial, probably secondary to seizure, CVA, dementia


Underlying disease process, supportive care





--Possible TIA [ Acute CVA ruled out]


Current Visit: Yes   Status: Acute   


Not a candidate for TPA, neuro workup so far is negativ neg 


sofar , possible TIA, acute CVA ruled out


No ASA per neurology due to h/o cerebral bleed





-- Dementia/memory loss


Current Visit: Yes   Status: Acute   


Supportive care, neurology consult if needed





-- Hypertension


Current Visit:yes   Status: Chronic   


Closely monitor blood pressures, continue current management


When necessary hydralazine





--History of cerebral amyloid angiopathy :


2 episodes of bleeding, discontinue aspirin





--Increasedsomnolence:


obstructive sleep apnea 


outpatient sleep studies  





--DVT prophylaxis


Current Visit: Yes   Status: Acute renal 


Lovenox





--Full code status


Current Visit: Yes   Status: Acute  





Physical therapy occupational therapy .


Plan of care is reviewed with the patient's multiple family members 


at the bedside mainly the daughter who is her caretaker





DC planning.Case management; possible home with home health versus subacute 

rehabilitation


When medically stable .














History


Interval history: 


Patient seen and examined


Feels slightly better,Minimally communicative


Vital signs noted








Hospitalist Physical





- Constitutional


Vitals: 


                                        











Temp Pulse Resp BP Pulse Ox


 


 97.3 F L  58 L  20   150/79   95 


 


 08/01/19 04:18  08/01/19 10:39  08/01/19 04:18  08/01/19 10:39  08/01/19 10:00











General appearance: Present: no acute distress, well-nourished, obese, other 

(sleeping easily.awakens)





- EENT


Eyes: Present: PERRL, EOM intact





- Neck


Neck: Present: supple, normal ROM





- Respiratory


Respiratory effort: normal


Respiratory: bilateral: diminished, negative: rales, rhonchi, wheezing





- Cardiovascular


Rhythm: regular


Heart Sounds: Present: S1 & S2





- Extremities


Extremities: no ischemia, No edema





- Abdominal


General gastrointestinal: non-tender, non-distended, normal bowel sounds





- Integumentary


Integumentary: Present: clear, warm





- Psychiatric


Psychiatric: cooperative, other (lethergy)





- Neurologic


Neurologic: other (residual weakness)





Results





- Labs


CBC & Chem 7: 


                                 07/30/19 05:32





                                 08/01/19 05:53


Labs: 


                             Laboratory Last Values











WBC  8.9 K/mm3 (4.5-11.0)   07/30/19  05:32    


 


RBC  4.92 M/mm3 (3.65-5.03)   07/30/19  05:32    


 


Hgb  15.3 gm/dl (10.1-14.3)  H  07/30/19  05:32    


 


Hct  46.0 % (30.3-42.9)  H  07/30/19  05:32    


 


MCV  94 fl (79-97)   07/30/19  05:32    


 


MCH  31 pg (28-32)   07/30/19  05:32    


 


MCHC  33 % (30-34)   07/30/19  05:32    


 


RDW  14.4 % (13.2-15.2)   07/30/19  05:32    


 


Plt Count  214 K/mm3 (140-440)   07/30/19  05:32    


 


Lymph % (Auto)  8.5 % (13.4-35.0)  L  07/30/19  05:32    


 


Mono % (Auto)  2.5 % (0.0-7.3)   07/30/19  05:32    


 


Eos % (Auto)  0.2 % (0.0-4.3)   07/30/19  05:32    


 


Baso % (Auto)  0.8 % (0.0-1.8)   07/30/19  05:32    


 


Lymph #  0.8 K/mm3 (1.2-5.4)  L  07/30/19  05:32    


 


Mono #  0.2 K/mm3 (0.0-0.8)   07/30/19  05:32    


 


Eos #  0.0 K/mm3 (0.0-0.4)   07/30/19  05:32    


 


Baso #  0.1 K/mm3 (0.0-0.1)   07/30/19  05:32    


 


Seg Neutrophils %  88.0 % (40.0-70.0)  H  07/30/19  05:32    


 


Seg Neutrophils #  7.9 K/mm3 (1.8-7.7)  H  07/30/19  05:32    


 


PT  13.1 Sec. (12.2-14.9)   07/30/19  05:32    


 


INR  1.02  (0.87-1.13)   07/30/19  05:32    


 


APTT  26.4 Sec. (24.2-36.6)   07/30/19  05:32    


 


  18.0 Sec. (15.1-19.6)   07/30/19  05:32    


 


Sodium  142 mmol/L (137-145)   08/01/19  05:53    


 


Potassium  4.0 mmol/L (3.6-5.0)   08/01/19  05:53    


 


Chloride  109.4 mmol/L ()  H  08/01/19  05:53    


 


Carbon Dioxide  23 mmol/L (22-30)   08/01/19  05:53    


 


  14 mmol/L  08/01/19  05:53    


 


BUN  9 mg/dL (7-17)   08/01/19  05:53    


 


  0.7 mg/dL (0.7-1.2)   08/01/19  05:53    


 


Estimated GFR  > 60 ml/min  08/01/19  05:53    


 


  13 %  08/01/19  05:53    


 


Glucose  117 mg/dL ()  H  08/01/19  05:53    


 


POC Glucose  100  ()   07/30/19  22:07    


 


  6.3 % (4-6)  H  07/31/19  08:30    


 


Calcium  8.5 mg/dL (8.4-10.2)   08/01/19  05:53    


 


Magnesium  2.00 mg/dL (1.7-2.3)   07/30/19  05:32    


 


  0.70 mg/dL (0.1-1.2)   08/01/19  05:53    


 


AST  25 units/L (5-40)   08/01/19  05:53    


 


ALT  14 units/L (7-56)   08/01/19  05:53    


 


  54 units/L ()   08/01/19  05:53    


 


  46.0 umol/L (25-60)   07/31/19  15:35    


 


  919 units/L ()  H  07/30/19  05:32    


 


  < 0.010 ng/mL (0.00-0.029)   07/30/19  05:32    


 


  6.3 g/dL (6.3-8.2)   08/01/19  05:53    


 


  3.1 g/dL (3.9-5)  L  08/01/19  05:53    


 


  1.0 %  08/01/19  05:53    


 


Triglycerides  58 mg/dL (2-149)   07/30/19  05:32    


 


Cholesterol  127 mg/dL ()   07/30/19  05:32    


 


  67 mg/dL ()   07/30/19  05:32    


 


  57 mg/dL (40-59)   07/30/19  05:32    


 


  2.22 %  07/30/19  05:32    


 


Vitamin B12  765.4 pg/mL (211-911)   07/31/19  15:35    


 


  16.61 ng/mL (7.3-26.0)   07/31/19  15:35    


 


TSH  4.640 mlU/mL (0.270-4.200)  H  07/31/19  15:35    


 


Free T4  1.19 ng/dL (0.76-1.46)   07/31/19  15:35    


 


  Straw  (Yellow)   07/30/19  Unknown


 


  Clear  (Clear)   07/30/19  Unknown


 


  6.0  (5.0-7.0)   07/30/19  Unknown


 


Ur Specific Gravity  1.009  (1.003-1.030)   07/30/19  Unknown


 


  <15 mg/dl mg/dL (Negative)   07/30/19  Unknown


 


  Neg mg/dL (Negative)   07/30/19  Unknown


 


  Neg mg/dL (Negative)   07/30/19  Unknown


 


  Neg  (Negative)   07/30/19  Unknown


 


  Neg  (Negative)   07/30/19  Unknown


 


  Neg  (Negative)   07/30/19  Unknown


 


  < 2.0 mg/dL (<2.0)   07/30/19  Unknown


 


Ur Leukocyte Esterase  Neg  (Negative)   07/30/19  Unknown


 


  1.0 /HPF (0.0-6.0)   07/30/19  Unknown


 


  1.0 /HPF (0.0-6.0)   07/30/19  Unknown


 


U Epithel Cells (Auto)  2.0 /HPF (0-13.0)   07/30/19  Unknown


 


Amorphous Crystals  Few   07/30/19  Unknown


 


  Few /HPF  07/30/19  Unknown














Active Medications





- Current Medications


Current Medications: 














Generic Name Dose Route Start Last Admin





  Trade Name Freq  PRN Reason Stop Dose Admin


 


Acetaminophen  650 mg  07/30/19 23:07  07/30/19 23:15





  Tylenol  PO   650 mg





  Q4H PRN   Administration





  Pain, Mild (1-3), headache  


 


Albuterol  2.5 mg  07/30/19 11:15 





  Proventil  IH  





  QIDRT PRN  





  Shortness Of Breath  


 


Lipase/Protease/Amylase  1 each  07/30/19 18:38 





  Pancrerosa Haile 10,500 Unit  FEEDTUBE  





  PRN PRN  





  For Clogged Feeding Tube  


 


Atorvastatin Calcium  40 mg  07/30/19 22:00  07/31/19 21:23





  Lipitor  PO   40 mg





  QHS TOMMY   Administration


 


Clonidine HCl  0.2 mg  07/30/19 14:00  08/01/19 10:39





  Catapres  PO   0.2 mg





  TID TOMMY   Administration


 


Enoxaparin Sodium  40 mg  07/30/19 12:00  07/31/19 11:08





  Lovenox  SUB-Q   Not Given





  QDAY@1000 TOMMY  


 


Famotidine  20 mg  07/30/19 12:00  08/01/19 10:40





  Pepcid  IV   20 mg





  QDAY TOMMY   Administration


 


Hydralazine HCl  50 mg  07/30/19 14:00  08/01/19 05:34





  Apresoline  PO   50 mg





  Q8HR TOMMY   Administration


 


Hydralazine HCl  10 mg  07/30/19 18:39 





  Apresoline  IV  





  Q4HR PRN  





  Hypertension  


 


Sodium Chloride  1,000 mls @ 100 mls/hr  07/30/19 19:00  08/01/19 05:45





  Nacl 0.9% 1000 Ml  IV   100 mls/hr





  AS DIRECT TOMMY   Administration


 


Labetalol HCl  10 mg  07/30/19 10:56 





  Normodyne  IV  





  Q5MIN PRN  





  to maintain SBP < 180  


 


Levetiracetam  1,000 mg  07/31/19 12:55  08/01/19 10:40





  Keppra  PO   1,000 mg





  BID TOMMY   Administration


 


Simple Syrup  15 ml  07/30/19 18:38 





  Simple Syrup  FEEDTUBE  





  PRN PRN  





  Hypoglycemia  


 


Simple Syrup  30 ml  07/30/19 18:38 





  Simple Syrup  FEEDTUBE  





  PRN PRN  





  Hypoglycemia  


 


Sodium Bicarbonate  325 mg  07/30/19 18:38 





  Sodium Bicarbonate  FEEDTUBE  





  PRN PRN  





  For Clogged Feeding Tube  


 


Sodium Chloride  10 ml  07/30/19 10:56 





  Sodium Chloride Flush Syringe 10 Ml  IV  





  PRN PRN  





  LINE FLUSH  














Nutrition/Malnutrition Assess





- Dietary Evaluation


Nutrition/Malnutrition Findings: 


                                        





Nutrition Notes                                            Start:  07/31/19 

14:51


Freq:                                                      Status: Active       




Protocol:                                                                       




 Document     07/31/19 14:51  RM  (Rec: 07/31/19 15:02  RM  [id number])


 Nutrition Notes


     Need for Assessment generated from:         MD Order


     Initial or Follow up                        Assessment


     Current Diagnosis                           COPD,Hypertension,Stroke


     Other Pertinent Diagnosis                   Dementia, Acute encephalopathy


                                                 , Seizures


     Current Diet                                GI soft


     Labs/Tests                                  Reviewed


     Pertinent Medications                       Reviewed


     Height                                      5 ft 6 in


     Weight                                      76.5 kg


     Ideal Body Weight (kg)                      59.09


     BMI                                         27.2


     Subjective/Other Information                Consulted for TF


                                                 recommendation. Screened for


                                                 skin risk.


                                                 Adrián 16 points. Per nurse pt


                                                 family refused placement of


                                                 dobhoff and diet was advanced.


                                                 Nurse also stated that pt did


                                                 not eat anything yesterday


                                                 but ate 25% of her meals today


                                                 .


     Percent of energy/protein needs met:        30%/26%


     Burn                                        Absent


     Trauma                                      Absent


     #1


      Nutrition Diagnosis                        Inadequate oral intake


      Etiology                                   dementia, acute encephalopathy


      As Evidenced by Signs and Symptoms         nurse statement that pt ate 25


                                                 % of her meals today


     Is patient on ventilator?                   No


     Is Patient Ambulatory and/or Out of Bed     Yes


     REE-(Edmond-St. Jeor-ambulatory/OOB) [     1692.275


      [id number]]                               


     Calculation Used for Recommendations        Edmond-St Jeor


     Additional Notes                            Protein Needs: 77-92g (1-1.2g/


                                                 kg)


                                                 Fluid Needs: 1 ml/kcal


 Nutrition Intervention


     Change Diet Order:                          Continue current


     Add Supplement/Snack (indicate name/kcal    Ensure Enlive 1 daily


      /protein )                                 


     Provides kCal:                              350


     Provides Protein (gm)                       20


     Goal #1                                     Meet at least 75% of calorie


                                                 and protein needs via PO and


                                                 ONS intakes


     Anticipated Discharge Needs:                Unable to determine at this


                                                 time


     Follow-Up By:                               08/02/19


     Additional Comments                         Follow for PO and ONS intakes

## 2019-08-01 NOTE — CAT SCAN REPORT
CTA neck with and without contrast



CLINICAL HISTORY: Altered mental status, strokelike symptoms.



Technique: Multiple contiguous postcontrast axial CT images of the neck were obtained at 0.625 mm int
ervals. 3 plane MIP reconstructions were produced. Precontrast localizing images were also performed.
 All CT scans at this location are performed using the CT dose reduction for [employer] by means of [employer]oma
haily exposure control.



FINDINGS: There are small foci of calcification involving carotid bifurcation and proximal internal c
arotid arteries bilaterally without significant stenosis by NASCET criteria. The beam hardening obscu
res the origin of the left vertebral artery. However, there appears to be moderate to stenosis which 
correlates with the earlier MRA of 7/30/2019. There is mild developmental hypoplasia of the right laura
tebral artery. However, there is no significant focal narrowing.



IMPRESSION:



There is no significant stenosis involving the carotid arteries by NASCET criteria.



There appears to be moderate to narrowing at the origin of the left vertebral artery which correlates
 with the earlier MRA neck.



Signer Name: Siddhartha Reyes MD 

Signed: 8/1/2019 5:56 PM

 Workstation Name: [id number]

## 2019-08-02 RX ADMIN — HYDRALAZINE HYDROCHLORIDE SCH MG: 25 TABLET, FILM COATED ORAL at 22:48

## 2019-08-02 RX ADMIN — HYDRALAZINE HYDROCHLORIDE SCH MG: 25 TABLET, FILM COATED ORAL at 14:00

## 2019-08-02 RX ADMIN — ENOXAPARIN SODIUM SCH: 100 INJECTION SUBCUTANEOUS at 10:00

## 2019-08-02 RX ADMIN — LEVETIRACETAM SCH MG: 500 SOLUTION ORAL at 22:47

## 2019-08-02 RX ADMIN — HYDRALAZINE HYDROCHLORIDE SCH MG: 25 TABLET, FILM COATED ORAL at 05:18

## 2019-08-02 RX ADMIN — ASPIRIN SCH MG: 81 TABLET, CHEWABLE ORAL at 17:04

## 2019-08-02 RX ADMIN — LEVETIRACETAM SCH MG: 500 SOLUTION ORAL at 10:01

## 2019-08-02 RX ADMIN — FAMOTIDINE SCH MG: 10 INJECTION, SOLUTION INTRAVENOUS at 10:01

## 2019-08-02 NOTE — PROGRESS NOTE
Assessment and Plan


Impression:


1.  Complex partial epilepsy, intractable


2.  Embolic strokes


3.  Memory loss


4.  Cerebral amyloid angiopathy


5.  Daytime somnolence





Plan:


1.  Could discharge if ambulatory with walker tomorrow with PT but will need a 

wheelchair as well.


2.  Restarted aspirin 81 mg due to history of embolic strokes.


3.  She had 30 day event monitoring to look for atrial fibrillation as I 

explained to her daughters, as her heart rhythm that can cause strokes and may 

not be detectable by the patient but is detectable automatically by the device.


4.  I told them that even though anticoagulation would not be safe given her 

cerebral amyloid angiopathy, she might be a candidate for a watchman procedure 

(plugging of atrial appendage which is often the source of blood clots).


5.  If 30 day event monitoring is negative, could consider a loop recorder which

I explained.  Did not discuss the possible need for a transesophageal 

echocardiogram though it is possible [facility] did one and the discharge summary I 

again requested may tell us that.


6.  Ordered stat repeat MRI in case her initial MRI was falsely negative as it 

can be in 6% of strokes in the posterior circulation which might then have given

her a field cut.  She could get this as an outpatient if not done in time for 

discharge.  Would not  must still need the 30 day event 

monitoring.


7.  Increased her levetiracetam to 1500 mg twice a day since only partial resp

onse on EEG to 1000 mg twice a day.  Not necessarily need a repeat outpatient 

EEG.  Should be referred to a neurologist however for follow-up as an 

outpatient.





35 minutes spent including detailed explanations as above regarding EEG findings

and seizures and strokes and possible need for Watchman procedure.











Subjective


Date of service: 08/02/19


Principal diagnosis: seizures, strokes


Interval history: 


HPI: This 68-year-old female is seen again for follow-up of complex partial 

seizures.  EEG today shows less of the triphasic waves suggesting they were 

epileptiform.  I have increased her Keppra accordingly.  Her daughter state that

she was able to hold a candidate of Ensure with either hand and drink from it.  

She denies headache or dizziness.  Family states she was not able to look to the

left when she was admitted which I told them fits with the left field cut I 

noted.  Her family feels she is mentally better today.  Though mentioned in the 

chart from [facility], her daughters were not aware of use of tramadol.  Records from

[facility] from October 2018 showed MRI report of multiple small foci positive on 

diffusion suggesting an cardioembolic source.  No record of an echocardiogram 

was done there however but negative here for PFO or other defect.  At the time, 

she was admitted for symptoms of right facial numbness, gait instability and 

mild dysarthria.  The diffusion positive foci were in the right occipital lobe, 

right corona radiata, and left corona radiata.  Other old hemosiderin deposits 

were seen as well.  Severe stenosis at C3-4 was noted.  CT angios of head and 

neck were normal there.  HDL was 44 with LDL 88.  Tox screen there was negative 

and alcohol was negative.  No discharge summary was provided and no copy was 

sent of the neurology consult that was planned.





General physical exam:


General appearance: Well-developed but overweight early 70s -American 

female in no acute distress, awake appearing.  Seen with 2 daughters at bedside.





Neurologic exam:


Mental status: Awake alert oriented to hospital but stammers the word (unusual 

for her according to daughters) and to Spring as the season but cannot give 

month or day or date and cannot give year after 2000 as prompt.  Says she's in 

[place] which daughters say would've been a place in [place] from 30 years 

ago and then corrects herself to [place] though unable to give me what State. 

He gives [place] after first name prompt.  Names pen and point of pen.


Cranial nerves: Left inferior field cut, PERRLA, EOMs full without nystagmus or 

diplopia, enlarged left palpebral fissure, hears finger rub bilaterally, 

shoulder shrug is 5 right and 4+ left, tongue protrudes in the midline.


Cerebellar: Finger to nose is intact, apraxic for heel-to-shin.


Motor exam upper extremities: No drift or pronation initially but tends to let 

left arm drop to bed, rapid alternating movements slower on the left.  Rapid 

alternating movements are slower on the left.


Motor exam lower extremities: can't lift either leg off bed.  Iliopsoas is 5 

bilaterally.  Quadriceps are 4- right and 3+ left with knee supported, apraxic 

for anterior tibials but at least 3- strength, gastrocs are 5 right and 4+ left.

 Rapid alternating movements are slow bilaterally but especially on the left.











Objective





- Vital Sign


                               Vital Signs - 12hr











  08/02/19 08/02/19 08/02/19





  05:13 05:18 10:00


 


Temperature 98.8 F  


 


Pulse Rate 79 78 


 


Respiratory 20  





Rate   


 


Blood Pressure 167/92 167/92 


 


Blood Pressure   





[Left]   


 


O2 Sat by Pulse 95  95





Oximetry   














  08/02/19 08/02/19 08/02/19





  10:39 12:07 14:32


 


Temperature  99.5 F 


 


Pulse Rate 99 H  


 


Respiratory   





Rate   


 


Blood Pressure   


 


Blood Pressure 158/86  





[Left]   


 


O2 Sat by Pulse   100





Oximetry   














- Laboratory Findings


CBC and BMP: 


                                 07/30/19 05:32





                                 08/01/19 05:53


Abnormal Lab Findings: 


                                  Abnormal Labs











  07/30/19 07/30/19 07/30/19





  05:32 05:32 05:32


 


Hgb  15.3 H  


 


Hct  46.0 H  


 


Lymph % (Auto)  8.5 L  


 


Lymph #  0.8 L  


 


Seg Neutrophils %  88.0 H  


 


Seg Neutrophils #  7.9 H  


 


Chloride   


 


Creatinine   0.6 L 


 


Glucose   190 H 


 


Hemoglobin A1c   


 


Total Creatine Kinase    919 H


 


Albumin   


 


TSH   














  07/31/19 07/31/19 08/01/19





  08:30 15:35 05:53


 


Hgb   


 


Hct   


 


Lymph % (Auto)   


 


Lymph #   


 


Seg Neutrophils %   


 


Seg Neutrophils #   


 


Chloride    109.4 H


 


Creatinine   


 


Glucose    117 H


 


Hemoglobin A1c  6.3 H  


 


Total Creatine Kinase   


 


Albumin    3.1 L


 


TSH   4.640 H

## 2019-08-02 NOTE — ELECTROENCEPHALOGRAM REPORT
Electroencephalogram EEG


Date of exam: 08/02/19


History: 


Jerking left arm on admission and staring at wall.  Triphasic activity on 

initial EEG which sometimes can represent nonconvulsive seizures.  Medical 

literature search suggested treating with Keppra and repeating EEG.





Description: 


This 19 channel (including 1 for EKG showing tachycardic) general portable EEG 

was done with 10/20 international montage in a 20 minute recording.  There was 

no alpha activity.  Background activity is at 4 Hz.  There is slowing in the 

right frontotemporal region at timestamp 00:03:15 in epoch 20 and later slowing 

in the the right frontocentral region at timestamp 00:13:45 in epoch 83 on most 

of the page.  Some triphasics are seen but much less than on previous recording.








Interpretation: 


Moderately abnormal waking/drowsy EEG due to slowing of the background which 

could be due to a diffuse process such as encephalopathy, and superimposed 

slowing in the right frontotemporal and right frontocentral region for which 

correlation with imaging is advised.  Lessening of triphasic waves suggests that

they were and are an epileptic process.

## 2019-08-02 NOTE — PROGRESS NOTE
Assessment and Plan


Assessment and plan: 


--Seizures


Current Visit: Yes   Status: Acute   


Complex partial epilepsy, nonintractable


Seizure precautions,keppra, Ativan as needed  


Neurology evaluation noted and appreciated , 


follow EEG , no driving





-- Acute metabolic encephalopathy


Current Visit: Yes   Status: Acute   


Multifactorial, probably secondary to seizure, CVA, dementia


Underlying disease process, supportive care





--Possible TIA [ Acute CVA ruled out]


Current Visit: Yes   Status: Acute   


Not a candidate for TPA, neuro workup so far is negative


possible TIA, No ASA per neurology due to h/o cerebral bleed





-- Dementia/memory loss


Current Visit: Yes   Status: Acute   


Supportive care, neurology following





-- Hypertension


Current Visit:yes   Status: Chronic   


Closely monitor blood pressures, continue current management


When necessary hydralazine





--History of cerebral amyloid angiopathy :


h/o 2 episodes of bleeding, discontinue aspirin





--Increasedsomnolence:


obstructive sleep apnea 


outpatient sleep studies  





--DVT prophylaxis


Current Visit: Yes   Status: Acute renal 


Lovenox





--Full code status


Current Visit: Yes   Status: Acute  





Physical therapy occupational therapy .





I discussed the case with  the case extensively, patient's condition


Treatment plan, reports of imaging studies, neurology recommendations


As the last discharge planning the different options.


The daughter requested discharge with home health and patient is stable


Case management will assist DC planning


I also discussed with neurologist Dr. Hatfield





Disposition; discharged home with [facility] when medically stable














History


Interval history: 


Patient seen and examined medical records reviewed


Patient is more alert and awake today, minimally communicative


Vital signs noted





Hospitalist Physical





- Constitutional


Vitals: 


                                        











Temp Pulse Resp BP Pulse Ox


 


 99.5 F   99 H  20   169/101   100 


 


 08/02/19 12:07  08/02/19 14:00  08/02/19 05:13  08/02/19 14:00  08/02/19 14:32











General appearance: Present: no acute distress, well-nourished, other 

(noncommunicative)





- EENT


Eyes: Present: PERRL, EOM intact





- Neck


Neck: Present: supple, normal ROM





- Respiratory


Respiratory effort: normal


Respiratory: bilateral: diminished, negative: rales, rhonchi, wheezing





- Cardiovascular


Rhythm: regular


Heart Sounds: Present: S1 & S2





- Extremities


Extremities: no ischemia, No edema





- Abdominal


General gastrointestinal: soft, non-tender, non-distended, normal bowel sounds





- Integumentary


Integumentary: Present: clear, warm





- Psychiatric


Psychiatric: other (confused at times)





- Neurologic


Neurologic: other (residual weakness)





Results





- Labs


CBC & Chem 7: 


                                 07/30/19 05:32





                                 08/01/19 05:53


Labs: 


                             Laboratory Last Values











WBC  8.9 K/mm3 (4.5-11.0)   07/30/19  05:32    


 


RBC  4.92 M/mm3 (3.65-5.03)   07/30/19  05:32    


 


Hgb  15.3 gm/dl (10.1-14.3)  H  07/30/19  05:32    


 


Hct  46.0 % (30.3-42.9)  H  07/30/19  05:32    


 


MCV  94 fl (79-97)   07/30/19  05:32    


 


MCH  31 pg (28-32)   07/30/19  05:32    


 


MCHC  33 % (30-34)   07/30/19  05:32    


 


RDW  14.4 % (13.2-15.2)   07/30/19  05:32    


 


Plt Count  214 K/mm3 (140-440)   07/30/19  05:32    


 


Lymph % (Auto)  8.5 % (13.4-35.0)  L  07/30/19  05:32    


 


Mono % (Auto)  2.5 % (0.0-7.3)   07/30/19  05:32    


 


Eos % (Auto)  0.2 % (0.0-4.3)   07/30/19  05:32    


 


Baso % (Auto)  0.8 % (0.0-1.8)   07/30/19  05:32    


 


Lymph #  0.8 K/mm3 (1.2-5.4)  L  07/30/19  05:32    


 


Mono #  0.2 K/mm3 (0.0-0.8)   07/30/19  05:32    


 


Eos #  0.0 K/mm3 (0.0-0.4)   07/30/19  05:32    


 


Baso #  0.1 K/mm3 (0.0-0.1)   07/30/19  05:32    


 


Seg Neutrophils %  88.0 % (40.0-70.0)  H  07/30/19  05:32    


 


Seg Neutrophils #  7.9 K/mm3 (1.8-7.7)  H  07/30/19  05:32    


 


PT  13.1 Sec. (12.2-14.9)   07/30/19  05:32    


 


INR  1.02  (0.87-1.13)   07/30/19  05:32    


 


APTT  26.4 Sec. (24.2-36.6)   07/30/19  05:32    


 


  18.0 Sec. (15.1-19.6)   07/30/19  05:32    


 


Sodium  142 mmol/L (137-145)   08/01/19  05:53    


 


Potassium  4.0 mmol/L (3.6-5.0)   08/01/19  05:53    


 


Chloride  109.4 mmol/L ()  H  08/01/19  05:53    


 


Carbon Dioxide  23 mmol/L (22-30)   08/01/19  05:53    


 


  14 mmol/L  08/01/19  05:53    


 


BUN  9 mg/dL (7-17)   08/01/19  05:53    


 


  0.7 mg/dL (0.7-1.2)   08/01/19  05:53    


 


Estimated GFR  > 60 ml/min  08/01/19  05:53    


 


  13 %  08/01/19  05:53    


 


Glucose  117 mg/dL ()  H  08/01/19  05:53    


 


POC Glucose  100  ()   07/30/19  22:07    


 


  6.3 % (4-6)  H  07/31/19  08:30    


 


Calcium  8.5 mg/dL (8.4-10.2)   08/01/19  05:53    


 


Magnesium  2.00 mg/dL (1.7-2.3)   07/30/19  05:32    


 


  0.70 mg/dL (0.1-1.2)   08/01/19  05:53    


 


AST  25 units/L (5-40)   08/01/19  05:53    


 


ALT  14 units/L (7-56)   08/01/19  05:53    


 


  54 units/L ()   08/01/19  05:53    


 


  46.0 umol/L (25-60)   07/31/19  15:35    


 


  919 units/L ()  H  07/30/19  05:32    


 


  < 0.010 ng/mL (0.00-0.029)   07/30/19  05:32    


 


  6.3 g/dL (6.3-8.2)   08/01/19  05:53    


 


  3.1 g/dL (3.9-5)  L  08/01/19  05:53    


 


  1.0 %  08/01/19  05:53    


 


Triglycerides  58 mg/dL (2-149)   07/30/19  05:32    


 


Cholesterol  127 mg/dL ()   07/30/19  05:32    


 


  67 mg/dL ()   07/30/19  05:32    


 


  57 mg/dL (40-59)   07/30/19  05:32    


 


  2.22 %  07/30/19  05:32    


 


Vitamin B12  765.4 pg/mL (211-911)   07/31/19  15:35    


 


  16.61 ng/mL (7.3-26.0)   07/31/19  15:35    


 


TSH  4.640 mlU/mL (0.270-4.200)  H  07/31/19  15:35    


 


Free T4  1.19 ng/dL (0.76-1.46)   07/31/19  15:35    


 


  Straw  (Yellow)   07/30/19  Unknown


 


  Clear  (Clear)   07/30/19  Unknown


 


  6.0  (5.0-7.0)   07/30/19  Unknown


 


Ur Specific Gravity  1.009  (1.003-1.030)   07/30/19  Unknown


 


  <15 mg/dl mg/dL (Negative)   07/30/19  Unknown


 


  Neg mg/dL (Negative)   07/30/19  Unknown


 


  Neg mg/dL (Negative)   07/30/19  Unknown


 


  Neg  (Negative)   07/30/19  Unknown


 


  Neg  (Negative)   07/30/19  Unknown


 


  Neg  (Negative)   07/30/19  Unknown


 


  < 2.0 mg/dL (<2.0)   07/30/19  Unknown


 


Ur Leukocyte Esterase  Neg  (Negative)   07/30/19  Unknown


 


  1.0 /HPF (0.0-6.0)   07/30/19  Unknown


 


  1.0 /HPF (0.0-6.0)   07/30/19  Unknown


 


U Epithel Cells (Auto)  2.0 /HPF (0-13.0)   07/30/19  Unknown


 


Amorphous Crystals  Few   07/30/19  Unknown


 


  Few /HPF  07/30/19  Unknown














Active Medications





- Current Medications


Current Medications: 














Generic Name Dose Route Start Last Admin





  Trade Name Freq  PRN Reason Stop Dose Admin


 


Acetaminophen  650 mg  07/30/19 23:07  07/30/19 23:15





  Tylenol  PO   650 mg





  Q4H PRN   Administration





  Pain, Mild (1-3), headache  


 


Albuterol  2.5 mg  07/30/19 11:15 





  Proventil  IH  





  QIDRT PRN  





  Shortness Of Breath  


 


Lipase/Protease/Amylase  1 each  07/30/19 18:38 





  Pancreazmarion Haile 10,500 Unit  FEEDTUBE  





  PRN PRN  





  For Clogged Feeding Tube  


 


Aspirin  81 mg  08/02/19 17:00  08/02/19 17:04





  Baby Aspirin  PO   81 mg





  QDAY TOMMY   Administration


 


Atorvastatin Calcium  40 mg  07/30/19 22:00  08/01/19 23:06





  Lipitor  PO   40 mg





  QHS TOMMY   Administration


 


Enoxaparin Sodium  40 mg  07/30/19 12:00  08/02/19 10:00





  Lovenox  SUB-Q   Not Given





  QDAY@1000 TOMMY  


 


Famotidine  20 mg  07/30/19 12:00  08/02/19 10:01





  Pepcid  IV   20 mg





  QDAY TOMMY   Administration


 


Hydralazine HCl  50 mg  07/30/19 14:00  08/02/19 14:00





  Apresoline  PO   50 mg





  Q8HR TOMMY   Administration


 


Hydralazine HCl  10 mg  07/30/19 18:39 





  Apresoline  IV  





  Q4HR PRN  





  Hypertension  


 


Labetalol HCl  10 mg  07/30/19 10:56 





  Normodyne  IV  





  Q5MIN PRN  





  to maintain SBP < 180  


 


Levetiracetam  1,500 mg  08/02/19 22:00 





  Keppra  PO  





  BID TOMMY  


 


Simple Syrup  15 ml  07/30/19 18:38 





  Simple Syrup  FEEDTUBE  





  PRN PRN  





  Hypoglycemia  


 


Simple Syrup  30 ml  07/30/19 18:38 





  Simple Syrup  FEEDTUBE  





  PRN PRN  





  Hypoglycemia  


 


Sodium Bicarbonate  325 mg  07/30/19 18:38 





  Sodium Bicarbonate  FEEDTUBE  





  PRN PRN  





  For Clogged Feeding Tube  


 


Sodium Chloride  10 ml  07/30/19 10:56 





  Sodium Chloride Flush Syringe 10 Ml  IV  





  PRN PRN  





  LINE FLUSH  














Nutrition/Malnutrition Assess





- Dietary Evaluation


Nutrition/Malnutrition Findings: 


                                        





Nutrition Notes                                            Start:  07/31/19 

14:51


Freq:                                                      Status: Active       




Protocol:                                                                       




 Document     07/31/19 14:51  RM  (Rec: 07/31/19 15:02  RM  [id number])


 Nutrition Notes


     Need for Assessment generated from:         MD Order


     Initial or Follow up                        Assessment


     Current Diagnosis                           COPD,Hypertension,Stroke


     Other Pertinent Diagnosis                   Dementia, Acute encephalopathy


                                                 , Seizures


     Current Diet                                GI soft


     Labs/Tests                                  Reviewed


     Pertinent Medications                       Reviewed


     Height                                      5 ft 6 in


     Weight                                      76.5 kg


     Ideal Body Weight (kg)                      59.09


     BMI                                         27.2


     Subjective/Other Information                Consulted for TF


                                                 recommendation. Screened for


                                                 skin risk.


                                                 Adrián 16 points. Per nurse pt


                                                 family refused placement of


                                                 dobhoff and diet was advanced.


                                                 Nurse also stated that pt did


                                                 not eat anything yesterday


                                                 but ate 25% of her meals today


                                                 .


     Percent of energy/protein needs met:        30%/26%


     Burn                                        Absent


     Trauma                                      Absent


     #1


      Nutrition Diagnosis                        Inadequate oral intake


      Etiology                                   dementia, acute encephalopathy


      As Evidenced by Signs and Symptoms         nurse statement that pt ate 25


                                                 % of her meals today


     Is patient on ventilator?                   No


     Is Patient Ambulatory and/or Out of Bed     Yes


     REE-([place]-ambulatory/OOB) [     1692.275


      [id number]]                               


     Calculation Used for Recommendations        [facility]


     Additional Notes                            Protein Needs: 77-92g (1-1.2g/


                                                 kg)


                                                 Fluid Needs: 1 ml/kcal


 Nutrition Intervention


     Change Diet Order:                          Continue current


     Add Supplement/Snack (indicate name/kcal    Ensure Enlive 1 daily


      /protein )                                 


     Provides kCal:                              350


     Provides Protein (gm)                       20


     Goal #1                                     Meet at least 75% of calorie


                                                 and protein needs via PO and


                                                 ONS intakes


     Anticipated Discharge Needs:                Unable to determine at this


                                                 time


     Follow-Up By:                               08/02/19


     Additional Comments                         Follow for PO and ONS intakes

## 2019-08-03 RX ADMIN — Medication PRN ML: at 10:10

## 2019-08-03 RX ADMIN — LEVETIRACETAM SCH MG: 500 SOLUTION ORAL at 10:09

## 2019-08-03 RX ADMIN — FAMOTIDINE SCH MG: 10 INJECTION, SOLUTION INTRAVENOUS at 10:10

## 2019-08-03 RX ADMIN — HYDRALAZINE HYDROCHLORIDE SCH MG: 25 TABLET, FILM COATED ORAL at 14:59

## 2019-08-03 RX ADMIN — Medication PRN ML: at 21:57

## 2019-08-03 RX ADMIN — HYDRALAZINE HYDROCHLORIDE SCH MG: 25 TABLET, FILM COATED ORAL at 21:57

## 2019-08-03 RX ADMIN — ASPIRIN SCH MG: 81 TABLET, CHEWABLE ORAL at 10:10

## 2019-08-03 RX ADMIN — LEVETIRACETAM SCH MG: 500 SOLUTION ORAL at 21:57

## 2019-08-03 RX ADMIN — ENOXAPARIN SODIUM SCH MG: 100 INJECTION SUBCUTANEOUS at 10:10

## 2019-08-03 RX ADMIN — HYDRALAZINE HYDROCHLORIDE SCH MG: 25 TABLET, FILM COATED ORAL at 06:51

## 2019-08-03 NOTE — MAGNETIC RESONANCE REPORT
MRI BRAIN WITHOUT CONTRAST



INDICATION / CLINICAL INFORMATION:

stroke. 



TECHNIQUE: 

Multiplanar, multisequence MR images of the brain were obtained. 



COMPARISON: 

The study is compared with the previous MRI of 7/30/2019.



FINDINGS:



BRAIN / INTRACRANIAL CONTENTS: There has been interval development of a 7 mm focus of increased diffu
horace signal within the right cerebellum compatible with developing acute infarction. There is a persi
stent small focus of increased diffusion signal along the posterior left periventricular region.



There is marked extensive cerebral, pontine and cerebellar white matter disease indicative of advance
d microvascular angiopathy. The findings also correlate with the prior study at. The susceptibility w
eighted imaging demonstrates multiple scattered foci of marked decreased signal compatible with chron
ic microhemorrhages at. The findings remain most notable within the left basal ganglia.



There is continued mild to moderate cerebral atrophy with associated mild prominence of the ventricul
ar system at. No extra-axial fluid collections or significant mass effect is identified. 



CRANIOCERVICAL JUNCTION: No significant abnormality.

VASCULAR FLOW-VOIDS: No significant abnormality.



ORBITS: No significant abnormality of visualized orbits.

SINUSES / MASTOIDS: There is extensive opacification of the right mastoid air cells which correlates 
with the prior study at.



ADDITIONAL FINDINGS: None. 



IMPRESSION:

1. There has been interval development of a smaller 7 mm acute infarct involving the right cerebellum
.

2. There is continued marked extensive a microvascular angiopathy as detailed above.



Signer Name: Siddhartha Reyes MD 

Signed: 8/3/2019 1:41 PM

 Workstation Name: [id number]

## 2019-08-03 NOTE — PROGRESS NOTE
Subjective


Date of service: 08/03/19


Principal diagnosis: seizures, strokes


Interval history: 


Dr. Edwards asked me to look over the MRI re-order there is clear cut small infact 

of the left inferior cerebellum very minimal  seen on T2 weighted images and 

diffusion weighted  was not here on the first MRI !!!








Objective





- Vital Sign


                               Vital Signs - 12hr











  08/03/19 08/03/19 08/03/19





  05:19 06:51 09:36


 


Temperature 98.8 F  


 


Pulse Rate 96 H 96 H 


 


Respiratory 18  





Rate   


 


Blood Pressure 153/87 153/87 


 


O2 Sat by Pulse 96  94





Oximetry   














- Laboratory Findings


CBC and BMP: 


                                 07/30/19 05:32





                                 08/01/19 05:53


Abnormal Lab Findings: 


                                  Abnormal Labs











  07/30/19 07/30/19 07/30/19





  05:32 05:32 05:32


 


Hgb  15.3 H  


 


Hct  46.0 H  


 


Lymph % (Auto)  8.5 L  


 


Lymph #  0.8 L  


 


Seg Neutrophils %  88.0 H  


 


Seg Neutrophils #  7.9 H  


 


Chloride   


 


Creatinine   0.6 L 


 


Glucose   190 H 


 


Hemoglobin A1c   


 


Total Creatine Kinase    919 H


 


Albumin   


 


TSH   














  07/31/19 07/31/19 08/01/19





  08:30 15:35 05:53


 


Hgb   


 


Hct   


 


Lymph % (Auto)   


 


Lymph #   


 


Seg Neutrophils %   


 


Seg Neutrophils #   


 


Chloride    109.4 H


 


Creatinine   


 


Glucose    117 H


 


Hemoglobin A1c  6.3 H  


 


Total Creatine Kinase   


 


Albumin    3.1 L


 


TSH   4.640 H

## 2019-08-03 NOTE — PROGRESS NOTE
Assessment and Plan


Assessment and plan: 


--Acute small cerebellar CVA;


on repeat MRI today 08/03/2019


Not a candidate for TPA , continue aspirin and statin





--Possible TIA [ Acute CVA ruled out]


Current Visit: Yes   Status: Acute   


Not a candidate for TPA, neuro workup so far is negative


possible TIA, 





--Seizures


Current Visit: Yes   Status: Acute   


Complex partial epilepsy, nonintractable


Seizure precautions,keppra, Ativan as needed  


Neurology evaluation noted and appreciated , 


follow EEG , no driving





-- Acute metabolic encephalopathy


Current Visit: Yes   Status: Acute   


Multifactorial, probably secondary to seizure, CVA, dementia


Underlying disease process, supportive care





-- Dementia/memory loss


Current Visit: Yes   Status: Acute   


Supportive care, neurology following





-- Hypertension


Current Visit:yes   Status: Chronic   


Closely monitor blood pressures, continue current management


When necessary hydralazine





--History of cerebral amyloid angiopathy :


h/o 2 episodes of bleeding in the remote past





--Increasedsomnolence:


obstructive sleep apnea 


outpatient sleep studies  





--DVT prophylaxis


Current Visit: Yes   Status: Acute renal 


Lovenox





--Full code status


Current Visit: Yes   Status: Acute  





Physical therapy occupational therapy .





I discussed the case with  Patient's daughter  patient's condition


Treatment plan, reports of imaging studies, neurology recommendations


and the discharge planning with  different options.


The daughter requested discharge home with home health when patient is stable


Case management will assist with DC needs


I also discussed with neurologist Dr. Hatfield





Disposition; discharged home with [facility] when medically stable








History


Interval history: 


Patient seen and examined medical records reviewed


Patient is more alert and awake today


Underwent MRI study recommended that 


MRI taken today shows interval development of small left 7 mm acute infarct 


involving right cerebellum extensive microvascular angiopathic changes


Vital signs noted








Hospitalist Physical





- Constitutional


Vitals: 


                                        











Temp Pulse Resp BP Pulse Ox


 


 98.8 F   96 H  18   153/87   94 


 


 08/03/19 05:19  08/03/19 06:51  08/03/19 05:19  08/03/19 06:51  08/03/19 09:36











General appearance: Present: no acute distress, well-nourished, other 

(noncommunicative)





- EENT


Eyes: Present: PERRL, EOM intact





- Neck


Neck: Present: supple, normal ROM





- Respiratory


Respiratory effort: normal


Respiratory: bilateral: diminished, negative: rales, rhonchi, wheezing





- Cardiovascular


Rhythm: regular


Heart Sounds: Present: S1 & S2





- Extremities


Extremities: no ischemia, No edema





- Abdominal


General gastrointestinal: soft, non-tender, non-distended, normal bowel sounds





- Integumentary


Integumentary: Present: clear, warm





- Psychiatric


Psychiatric: appropriate mood/affect, cooperative, other (dementia, noncommunic

ative)





- Neurologic


Neurologic: other (residual weakness)





Results





- Labs


CBC & Chem 7: 


                                 07/30/19 05:32





                                 08/01/19 05:53


Labs: 


                             Laboratory Last Values











WBC  8.9 K/mm3 (4.5-11.0)   07/30/19  05:32    


 


RBC  4.92 M/mm3 (3.65-5.03)   07/30/19  05:32    


 


Hgb  15.3 gm/dl (10.1-14.3)  H  07/30/19  05:32    


 


Hct  46.0 % (30.3-42.9)  H  07/30/19  05:32    


 


MCV  94 fl (79-97)   07/30/19  05:32    


 


MCH  31 pg (28-32)   07/30/19  05:32    


 


MCHC  33 % (30-34)   07/30/19  05:32    


 


RDW  14.4 % (13.2-15.2)   07/30/19  05:32    


 


Plt Count  214 K/mm3 (140-440)   07/30/19  05:32    


 


Lymph % (Auto)  8.5 % (13.4-35.0)  L  07/30/19  05:32    


 


Mono % (Auto)  2.5 % (0.0-7.3)   07/30/19  05:32    


 


Eos % (Auto)  0.2 % (0.0-4.3)   07/30/19  05:32    


 


Baso % (Auto)  0.8 % (0.0-1.8)   07/30/19  05:32    


 


Lymph #  0.8 K/mm3 (1.2-5.4)  L  07/30/19  05:32    


 


Mono #  0.2 K/mm3 (0.0-0.8)   07/30/19  05:32    


 


Eos #  0.0 K/mm3 (0.0-0.4)   07/30/19  05:32    


 


Baso #  0.1 K/mm3 (0.0-0.1)   07/30/19  05:32    


 


Seg Neutrophils %  88.0 % (40.0-70.0)  H  07/30/19  05:32    


 


Seg Neutrophils #  7.9 K/mm3 (1.8-7.7)  H  07/30/19  05:32    


 


PT  13.1 Sec. (12.2-14.9)   07/30/19  05:32    


 


INR  1.02  (0.87-1.13)   07/30/19  05:32    


 


APTT  26.4 Sec. (24.2-36.6)   07/30/19  05:32    


 


  18.0 Sec. (15.1-19.6)   07/30/19  05:32    


 


Sodium  142 mmol/L (137-145)   08/01/19  05:53    


 


Potassium  4.0 mmol/L (3.6-5.0)   08/01/19  05:53    


 


Chloride  109.4 mmol/L ()  H  08/01/19  05:53    


 


Carbon Dioxide  23 mmol/L (22-30)   08/01/19  05:53    


 


  14 mmol/L  08/01/19  05:53    


 


BUN  9 mg/dL (7-17)   08/01/19  05:53    


 


  0.7 mg/dL (0.7-1.2)   08/01/19  05:53    


 


Estimated GFR  > 60 ml/min  08/01/19  05:53    


 


  13 %  08/01/19  05:53    


 


Glucose  117 mg/dL ()  H  08/01/19  05:53    


 


POC Glucose  100  ()   07/30/19  22:07    


 


  6.3 % (4-6)  H  07/31/19  08:30    


 


Calcium  8.5 mg/dL (8.4-10.2)   08/01/19  05:53    


 


Magnesium  2.00 mg/dL (1.7-2.3)   07/30/19  05:32    


 


  0.70 mg/dL (0.1-1.2)   08/01/19  05:53    


 


AST  25 units/L (5-40)   08/01/19  05:53    


 


ALT  14 units/L (7-56)   08/01/19  05:53    


 


  54 units/L ()   08/01/19  05:53    


 


  46.0 umol/L (25-60)   07/31/19  15:35    


 


  919 units/L ()  H  07/30/19  05:32    


 


  < 0.010 ng/mL (0.00-0.029)   07/30/19  05:32    


 


  6.3 g/dL (6.3-8.2)   08/01/19  05:53    


 


  3.1 g/dL (3.9-5)  L  08/01/19  05:53    


 


  1.0 %  08/01/19  05:53    


 


Triglycerides  58 mg/dL (2-149)   07/30/19  05:32    


 


Cholesterol  127 mg/dL ()   07/30/19  05:32    


 


  67 mg/dL ()   07/30/19  05:32    


 


  57 mg/dL (40-59)   07/30/19  05:32    


 


  2.22 %  07/30/19  05:32    


 


Vitamin B12  765.4 pg/mL (211-911)   07/31/19  15:35    


 


  16.61 ng/mL (7.3-26.0)   07/31/19  15:35    


 


TSH  4.640 mlU/mL (0.270-4.200)  H  07/31/19  15:35    


 


Free T4  1.19 ng/dL (0.76-1.46)   07/31/19  15:35    


 


  Straw  (Yellow)   07/30/19  Unknown


 


  Clear  (Clear)   07/30/19  Unknown


 


  6.0  (5.0-7.0)   07/30/19  Unknown


 


Ur Specific Gravity  1.009  (1.003-1.030)   07/30/19  Unknown


 


  <15 mg/dl mg/dL (Negative)   07/30/19  Unknown


 


  Neg mg/dL (Negative)   07/30/19  Unknown


 


  Neg mg/dL (Negative)   07/30/19  Unknown


 


  Neg  (Negative)   07/30/19  Unknown


 


  Neg  (Negative)   07/30/19  Unknown


 


  Neg  (Negative)   07/30/19  Unknown


 


  < 2.0 mg/dL (<2.0)   07/30/19  Unknown


 


Ur Leukocyte Esterase  Neg  (Negative)   07/30/19  Unknown


 


  1.0 /HPF (0.0-6.0)   07/30/19  Unknown


 


  1.0 /HPF (0.0-6.0)   07/30/19  Unknown


 


U Epithel Cells (Auto)  2.0 /HPF (0-13.0)   07/30/19  Unknown


 


Amorphous Crystals  Few   07/30/19  Unknown


 


  Few /HPF  07/30/19  Unknown














Active Medications





- Current Medications


Current Medications: 














Generic Name Dose Route Start Last Admin





  Trade Name Freq  PRN Reason Stop Dose Admin


 


Acetaminophen  650 mg  07/30/19 23:07  07/30/19 23:15





  Tylenol  PO   650 mg





  Q4H PRN   Administration





  Pain, Mild (1-3), headache  


 


Albuterol  2.5 mg  07/30/19 11:15 





  Proventil  IH  





  QIDRT PRN  





  Shortness Of Breath  


 


Lipase/Protease/Amylase  1 each  07/30/19 18:38 





  Pancreaze Dr 10,500 Unit  FEEDTUBE  





  PRN PRN  





  For Clogged Feeding Tube  


 


Aspirin  81 mg  08/02/19 17:00  08/03/19 10:10





  Baby Aspirin  PO   81 mg





  QDAY TOMMY   Administration


 


Atorvastatin Calcium  40 mg  07/30/19 22:00  08/02/19 22:48





  Lipitor  PO   40 mg





  QHS TOMMY   Administration


 


Enoxaparin Sodium  40 mg  07/30/19 12:00  08/03/19 10:10





  Lovenox  SUB-Q   40 mg





  QDAY@1000 TOMMY   Administration


 


Famotidine  20 mg  07/30/19 12:00  08/03/19 10:10





  Pepcid  IV   20 mg





  QDAY TOMMY   Administration


 


Hydralazine HCl  50 mg  07/30/19 14:00  08/03/19 06:51





  Apresoline  PO   50 mg





  Q8HR TOMMY   Administration


 


Hydralazine HCl  10 mg  07/30/19 18:39 





  Apresoline  IV  





  Q4HR PRN  





  Hypertension  


 


Labetalol HCl  10 mg  07/30/19 10:56 





  Normodyne  IV  





  Q5MIN PRN  





  to maintain SBP < 180  


 


Levetiracetam  1,500 mg  08/02/19 22:00  08/03/19 10:09





  Keppra  PO   1,500 mg





  BID TMOMY   Administration


 


Lorazepam  2 mg  08/03/19 11:07  08/03/19 11:44





  Ativan  IM   2 mg





  ON CALL PRN   Administration





  Agitation  


 


Simple Syrup  15 ml  07/30/19 18:38 





  Simple Syrup  FEEDTUBE  





  PRN PRN  





  Hypoglycemia  


 


Simple Syrup  30 ml  07/30/19 18:38 





  Simple Syrup  FEEDTUBE  





  PRN PRN  





  Hypoglycemia  


 


Sodium Bicarbonate  325 mg  07/30/19 18:38 





  Sodium Bicarbonate  FEEDTUBE  





  PRN PRN  





  For Clogged Feeding Tube  


 


Sodium Chloride  10 ml  07/30/19 10:56  08/03/19 10:10





  Sodium Chloride Flush Syringe 10 Ml  IV   10 ml





  PRN PRN   Administration





  LINE FLUSH  














Nutrition/Malnutrition Assess





- Dietary Evaluation


Nutrition/Malnutrition Findings: 


                                        





Nutrition Notes                                            Start:  07/31/19 

14:51


Freq:                                                      Status: Active       




Protocol:                                                                       




 Document     08/02/19 17:13  RM  (Rec: 08/02/19 17:19  RM  [id number])


 Nutrition Notes


     Initial or Follow up                        Reassessment


     Current Diagnosis                           COPD,Hypertension,Stroke


     Other Pertinent Diagnosis                   Dementia, Acute encephalopathy


                                                 , Seizures


     Current Diet                                GI soft


     Labs/Tests                                  Reviewed


     Pertinent Medications                       Reviewed


     Height                                      5 ft 6 in


     Weight                                      76.53 kg


     Ideal Body Weight (kg)                      59.09


     BMI                                         27.2


     Subjective/Other Information                Pt and pt daughter in room at


                                                 time of visit. Pt daughter


                                                 stated that pt drank 2 Ensure


                                                 Enlive, one from today and one


                                                 from yesterday. Also stated


                                                 that pt ate 1/3 of her


                                                 breakfast.


     Percent of energy/protein needs met:        82%/87%


     Burn                                        Absent


     Trauma                                      Absent


     #1


      Nutrition Diagnosis                        Inadequate oral intake


      As Evidenced by Signs and Symptoms         pt meeting 82% of calorie and


                                                 87% of protein needs


      Diagnosis Progress(for reassessment        Resolved


       documentation)                            


     Is patient on ventilator?                   No


     Is Patient Ambulatory and/or Out of Bed     Yes


     REE-([facility]-ambulatory/OOB) [     [id number]


      [email or web address]]                               


     Calculation Used for Recommendations        [facility]


     Additional Notes                            Protein Needs: 77-92g (1-1.2g/


                                                 kg)


                                                 Fluid Needs: 1 ml/kcal


 Nutrition Intervention


     Change Diet Order:                          Continue current


     Add Supplement/Snack (indicate name/kcal    Ensure Enlive BID


      /protein )                                 


     Provides kCal:                              700


     Provides Protein (gm)                       40


     Goal #1                                     Continue to meet at least 75%


                                                 of calorie and protein needs


                                                 via PO and ONS intakes


     Anticipated Discharge Needs:                Unable to determine at this


                                                 time


     Follow-Up By:                               08/07/19


     Additional Comments                         Follow for PO and ONS intakes

## 2019-08-04 LAB — VITAMIN D2 SERPL-MCNC: <4 NG/ML

## 2019-08-04 RX ADMIN — ENOXAPARIN SODIUM SCH MG: 100 INJECTION SUBCUTANEOUS at 09:28

## 2019-08-04 RX ADMIN — HYDRALAZINE HYDROCHLORIDE SCH MG: 25 TABLET, FILM COATED ORAL at 14:51

## 2019-08-04 RX ADMIN — AMLODIPINE BESYLATE SCH MG: 5 TABLET ORAL at 09:28

## 2019-08-04 RX ADMIN — ASPIRIN SCH MG: 81 TABLET, CHEWABLE ORAL at 09:28

## 2019-08-04 RX ADMIN — Medication PRN ML: at 21:04

## 2019-08-04 RX ADMIN — LEVETIRACETAM SCH MG: 500 SOLUTION ORAL at 09:27

## 2019-08-04 RX ADMIN — FAMOTIDINE SCH MG: 10 INJECTION, SOLUTION INTRAVENOUS at 09:28

## 2019-08-04 RX ADMIN — HYDRALAZINE HYDROCHLORIDE SCH MG: 25 TABLET, FILM COATED ORAL at 06:38

## 2019-08-04 RX ADMIN — LEVETIRACETAM SCH MG: 500 SOLUTION ORAL at 21:03

## 2019-08-04 RX ADMIN — HYDRALAZINE HYDROCHLORIDE SCH MG: 25 TABLET, FILM COATED ORAL at 21:03

## 2019-08-04 NOTE — CONSULTATION
HISTORY OF PRESENT ILLNESS:  This is a 68-year-old black female who presents to

[facility].  She presented with a history of weakness,

problems with coordination, severe dizziness.  I have reviewed her followup MRI

scan and she has a small ischemic lesion in the inferior cerebellum.  This

matches with the T2 weighted images and is also seen on the diffusion weighted

image.  Interestingly, the initial CT scan and MRI did not show this lesion. 

Also, it is noted that she has MRI and CTA changes within the right vertebral

artery, which typically does in some cases have vascular supply to the right

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

flow into the inferior portions of the cerebellum with bilateral supply.  I

think this certainly is the causation of the stroke, small vessel disease.  I

would recommend medical therapy for this.



PHYSICAL EXAMINATION:

VITAL SIGNS:  At present time, her vital signs are stable.

GENERAL:  She is alert, doing better.  Cranial nerves are intact.  Speech clear.

 Affect appropriate.

NECK:  Supple.

NEUROLOGIC:  Motor tone is good and she is not having any seizure activity.



IMPRESSION:  Ischemic stroke, right cerebellar hemisphere, inferior portion,

related to penetrating vessel disease, likely hypertensive, atherosclerotic in

origin, unlikely to be embolic; therefore, I would not recommend starting the

patient on Coumadin.



PLAN:  Discharged at this point.  Follow up with me in the office.





DD: 08/04/2019 15:35

DT: 08/04/2019 23:53

JOB# [id number]  [id number]

CHARLY/SIGIFREDO

## 2019-08-04 NOTE — PROGRESS NOTE
Assessment and Plan


Assessment and plan: 


--Acute small cerebellar CVA;


7 mm acute infarct involving the right cerebellum ,


markedly extensive microvascular angiopathy on repeat MRI today 08/03/2019


Not a candidate for TPA , continue aspirin and statin


Physical therapy occupational therapy





--Seizures


Current Visit: Yes   Status: Acute   


Complex partial epilepsy, nonintractable


Seizure precautions,keppra, Ativan as needed  


Neurology evaluation noted and appreciated , 


follow EEG , no driving





-- Acute metabolic encephalopathy: Present on admission


Current Visit: Yes   Status: Acute   


Neuro Workup  negative CVA ruled out, 


The patient has acute CVA on repeat MRI 8/3/19


patient is more alert and awake today





-- Dementia/memory loss


Current Visit: Yes   Status: Acute   


Supportive care, neurology following





-- Hypertension


Current Visit:yes   Status: Chronic   


Closely monitor blood pressures, continue current management


When necessary hydralazine





--History of cerebral amyloid angiopathy :


h/o 2 episodes of bleeding in the remote past





--Increased somnolence:


Patient is more alert and awake today responding


Appropriately,more verbal and more awake





--DVT prophylaxis


Current Visit: Yes   Status: Acute renal 


Lovenox





--Full code status


Current Visit: Yes   Status: Acute  





Physical therapy occupational therapy .





I discussed the case with  Patient's daughter Pura [phone] findings on 

rpt MRI 


Treatment plan, reports of all imaging studies, neurology recommendations


and the discharge planning with  different options.


The daughter requested discharge home with home health when patient is stable


Discussed with  neuro .


Disposition;f/u PT OT evaln and DC home with [facility] as requested by the daughter.


When patient is medically stable














History


Interval history: 


The patient seen and examined ,medical records reviewed


Patient is more alert and awake and responding appropriately


Repeat MRI done yesterday revealed 7 mm acute infarct on the rt cerebellar area


Vital signs noted








Hospitalist Physical





- Constitutional


Vitals: 


                                        











Temp Pulse Resp BP Pulse Ox


 


 98.1 F   69   20   143/91   96 


 


 08/04/19 06:06  08/04/19 09:28  08/04/19 06:06  08/04/19 09:28  08/04/19 06:06











General appearance: Present: no acute distress, well-nourished, other 

(noncommunicative)





- EENT


Eyes: Present: PERRL, EOM intact





- Neck


Neck: Present: supple, normal ROM





- Respiratory


Respiratory effort: normal


Respiratory: bilateral: diminished, rales, negative: rhonchi, wheezing





- Cardiovascular


Rhythm: regular


Heart Sounds: Present: S1 & S2





- Extremities


Extremities: no ischemia, No edema





- Abdominal


General gastrointestinal: soft, non-tender, non-distended, normal bowel sounds





- Integumentary


Integumentary: Present: clear, warm





- Psychiatric


Psychiatric: appropriate mood/affect, cooperative





- Neurologic


Neurologic: moves all extremities, other (  speech clear, residual weakness)





Results





- Labs


CBC & Chem 7: 


                                 07/30/19 05:32





                                 08/01/19 05:53


Labs: 


                             Laboratory Last Values











WBC  8.9 K/mm3 (4.5-11.0)   07/30/19  05:32    


 


RBC  4.92 M/mm3 (3.65-5.03)   07/30/19  05:32    


 


Hgb  15.3 gm/dl (10.1-14.3)  H  07/30/19  05:32    


 


Hct  46.0 % (30.3-42.9)  H  07/30/19  05:32    


 


MCV  94 fl (79-97)   07/30/19  05:32    


 


MCH  31 pg (28-32)   07/30/19  05:32    


 


MCHC  33 % (30-34)   07/30/19  05:32    


 


RDW  14.4 % (13.2-15.2)   07/30/19  05:32    


 


Plt Count  214 K/mm3 (140-440)   07/30/19  05:32    


 


Lymph % (Auto)  8.5 % (13.4-35.0)  L  07/30/19  05:32    


 


Mono % (Auto)  2.5 % (0.0-7.3)   07/30/19  05:32    


 


Eos % (Auto)  0.2 % (0.0-4.3)   07/30/19  05:32    


 


Baso % (Auto)  0.8 % (0.0-1.8)   07/30/19  05:32    


 


Lymph #  0.8 K/mm3 (1.2-5.4)  L  07/30/19  05:32    


 


Mono #  0.2 K/mm3 (0.0-0.8)   07/30/19  05:32    


 


Eos #  0.0 K/mm3 (0.0-0.4)   07/30/19  05:32    


 


Baso #  0.1 K/mm3 (0.0-0.1)   07/30/19  05:32    


 


Seg Neutrophils %  88.0 % (40.0-70.0)  H  07/30/19  05:32    


 


Seg Neutrophils #  7.9 K/mm3 (1.8-7.7)  H  07/30/19  05:32    


 


PT  13.1 Sec. (12.2-14.9)   07/30/19  05:32    


 


INR  1.02  (0.87-1.13)   07/30/19  05:32    


 


APTT  26.4 Sec. (24.2-36.6)   07/30/19  05:32    


 


  18.0 Sec. (15.1-19.6)   07/30/19  05:32    


 


Sodium  142 mmol/L (137-145)   08/01/19  05:53    


 


Potassium  4.0 mmol/L (3.6-5.0)   08/01/19  05:53    


 


Chloride  109.4 mmol/L ()  H  08/01/19  05:53    


 


Carbon Dioxide  23 mmol/L (22-30)   08/01/19  05:53    


 


  14 mmol/L  08/01/19  05:53    


 


BUN  9 mg/dL (7-17)   08/01/19  05:53    


 


  0.7 mg/dL (0.7-1.2)   08/01/19  05:53    


 


Estimated GFR  > 60 ml/min  08/01/19  05:53    


 


  13 %  08/01/19  05:53    


 


Glucose  117 mg/dL ()  H  08/01/19  05:53    


 


POC Glucose  100  ()   07/30/19  22:07    


 


  6.3 % (4-6)  H  07/31/19  08:30    


 


Calcium  8.5 mg/dL (8.4-10.2)   08/01/19  05:53    


 


Magnesium  2.00 mg/dL (1.7-2.3)   07/30/19  05:32    


 


  0.70 mg/dL (0.1-1.2)   08/01/19  05:53    


 


AST  25 units/L (5-40)   08/01/19  05:53    


 


ALT  14 units/L (7-56)   08/01/19  05:53    


 


  54 units/L ()   08/01/19  05:53    


 


  46.0 umol/L (25-60)   07/31/19  15:35    


 


  919 units/L ()  H  07/30/19  05:32    


 


  < 0.010 ng/mL (0.00-0.029)   07/30/19  05:32    


 


  6.3 g/dL (6.3-8.2)   08/01/19  05:53    


 


  3.1 g/dL (3.9-5)  L  08/01/19  05:53    


 


  1.0 %  08/01/19  05:53    


 


Triglycerides  58 mg/dL (2-149)   07/30/19  05:32    


 


Cholesterol  127 mg/dL ()   07/30/19  05:32    


 


  67 mg/dL ()   07/30/19  05:32    


 


  57 mg/dL (40-59)   07/30/19  05:32    


 


  2.22 %  07/30/19  05:32    


 


Vitamin B12  765.4 pg/mL (211-911)   07/31/19  15:35    


 


  16.61 ng/mL (7.3-26.0)   07/31/19  15:35    


 


TSH  4.640 mlU/mL (0.270-4.200)  H  07/31/19  15:35    


 


Free T4  1.19 ng/dL (0.76-1.46)   07/31/19  15:35    


 


  Straw  (Yellow)   07/30/19  Unknown


 


  Clear  (Clear)   07/30/19  Unknown


 


  6.0  (5.0-7.0)   07/30/19  Unknown


 


Ur Specific Gravity  1.009  (1.003-1.030)   07/30/19  Unknown


 


  <15 mg/dl mg/dL (Negative)   07/30/19  Unknown


 


  Neg mg/dL (Negative)   07/30/19  Unknown


 


  Neg mg/dL (Negative)   07/30/19  Unknown


 


  Neg  (Negative)   07/30/19  Unknown


 


  Neg  (Negative)   07/30/19  Unknown


 


  Neg  (Negative)   07/30/19  Unknown


 


  < 2.0 mg/dL (<2.0)   07/30/19  Unknown


 


Ur Leukocyte Esterase  Neg  (Negative)   07/30/19  Unknown


 


  1.0 /HPF (0.0-6.0)   07/30/19  Unknown


 


  1.0 /HPF (0.0-6.0)   07/30/19  Unknown


 


U Epithel Cells (Auto)  2.0 /HPF (0-13.0)   07/30/19  Unknown


 


Amorphous Crystals  Few   07/30/19  Unknown


 


  Few /HPF  07/30/19  Unknown














Active Medications





- Current Medications


Current Medications: 














Generic Name Dose Route Start Last Admin





  Trade Name Freq  PRN Reason Stop Dose Admin


 


Acetaminophen  650 mg  07/30/19 23:07  07/30/19 23:15





  Tylenol  PO   650 mg





  Q4H PRN   Administration





  Pain, Mild (1-3), headache  


 


Albuterol  2.5 mg  07/30/19 11:15 





  Proventil  IH  





  QIDRT PRN  





  Shortness Of Breath  


 


Amlodipine Besylate  5 mg  08/04/19 10:00  08/04/19 09:28





  Norvasc  PO   5 mg





  QDAY TOMMY   Administration


 


Lipase/Protease/Amylase  1 each  07/30/19 18:38 





  Pancreaze  10,500 Unit  FEEDTUBE  





  PRN PRN  





  For Clogged Feeding Tube  


 


Aspirin  81 mg  08/02/19 17:00  08/04/19 09:28





  Baby Aspirin  PO   81 mg





  QDAY TOMMY   Administration


 


Atorvastatin Calcium  40 mg  07/30/19 22:00  08/03/19 21:58





  Lipitor  PO   40 mg





  QHS TOMMY   Administration


 


Enoxaparin Sodium  40 mg  07/30/19 12:00  08/04/19 09:28





  Lovenox  SUB-Q   40 mg





  QDAY@1000 TOMMY   Administration


 


Famotidine  20 mg  07/30/19 12:00  08/04/19 09:28





  Pepcid  IV   20 mg





  QDAY TOMMY   Administration


 


Hydralazine HCl  50 mg  07/30/19 14:00  08/04/19 06:38





  Apresoline  PO   50 mg





  Q8HR TOMMY   Administration


 


Hydralazine HCl  10 mg  07/30/19 18:39 





  Apresoline  IV  





  Q4HR PRN  





  Hypertension  


 


Labetalol HCl  10 mg  07/30/19 10:56 





  Normodyne  IV  





  Q5MIN PRN  





  to maintain SBP < 180  


 


Levetiracetam  1,500 mg  08/02/19 22:00  08/04/19 09:27





  Keppra  PO   1,500 mg





  BID TOMMY   Administration


 


Lorazepam  2 mg  08/03/19 11:07  08/03/19 11:44





  Ativan  IM   2 mg





  ON CALL PRN   Administration





  Agitation  


 


Simple Syrup  15 ml  07/30/19 18:38 





  Simple Syrup  FEEDTUBE  





  PRN PRN  





  Hypoglycemia  


 


Simple Syrup  30 ml  07/30/19 18:38 





  Simple Syrup  FEEDTUBE  





  PRN PRN  





  Hypoglycemia  


 


Sodium Bicarbonate  325 mg  07/30/19 18:38 





  Sodium Bicarbonate  FEEDTUBE  





  PRN PRN  





  For Clogged Feeding Tube  


 


Sodium Chloride  10 ml  07/30/19 10:56  08/03/19 21:57





  Sodium Chloride Flush Syringe 10 Ml  IV   10 ml





  PRN PRN   Administration





  LINE FLUSH  














Nutrition/Malnutrition Assess





- Dietary Evaluation


Nutrition/Malnutrition Findings: 


                                        





Nutrition Notes                                            Start:  07/31/19 

14:51


Freq:                                                      Status: Active       




Protocol:                                                                       




 Document     08/02/19 17:13  RM  (Rec: 08/02/19 17:19  RM  [id number])


 Nutrition Notes


     Initial or Follow up                        Reassessment


     Current Diagnosis                           COPD,Hypertension,Stroke


     Other Pertinent Diagnosis                   Dementia, Acute encephalopathy


                                                 , Seizures


     Current Diet                                GI soft


     Labs/Tests                                  Reviewed


     Pertinent Medications                       Reviewed


     Height                                      5 ft 6 in


     Weight                                      76.53 kg


     Ideal Body Weight (kg)                      59.09


     BMI                                         27.2


     Subjective/Other Information                Pt and pt daughter in room at


                                                 time of visit. Pt daughter


                                                 stated that pt drank 2 Ensure


                                                 Enlive, one from today and one


                                                 from yesterday. Also stated


                                                 that pt ate 1/3 of her


                                                 breakfast.


     Percent of energy/protein needs met:        82%/87%


     Burn                                        Absent


     Trauma                                      Absent


     #1


      Nutrition Diagnosis                        Inadequate oral intake


      As Evidenced by Signs and Symptoms         pt meeting 82% of calorie and


                                                 87% of protein needs


      Diagnosis Progress(for reassessment        Resolved


       documentation)                            


     Is patient on ventilator?                   No


     Is Patient Ambulatory and/or Out of Bed     Yes


     REE-([facility]-ambulatory/OOB) [     1692.665


      [email or web address]]                               


     Calculation Used for Recommendations        [facility]


     Additional Notes                            Protein Needs: 77-92g (1-1.2g/


                                                 kg)


                                                 Fluid Needs: 1 ml/kcal


 Nutrition Intervention


     Change Diet Order:                          Continue current


     Add Supplement/Snack (indicate name/kcal    Ensure Enlive BID


      /protein )                                 


     Provides kCal:                              700


     Provides Protein (gm)                       40


     Goal #1                                     Continue to meet at least 75%


                                                 of calorie and protein needs


                                                 via PO and ONS intakes


     Anticipated Discharge Needs:                Unable to determine at this


                                                 time


     Follow-Up By:                               08/07/19


     Additional Comments                         Follow for PO and ONS intakes

## 2019-08-04 NOTE — PROGRESS NOTE
Subjective


Date of service: 08/04/19


Principal diagnosis: seizures, strokes


Interval history: 


 


see my dictated note it is OK to discharge the patient on medical therapy and 

follow up in the office





spoke to Dr. KESHIA stiles





Objective





- Vital Sign


                               Vital Signs - 12hr











  08/04/19 08/04/19 08/04/19





  06:06 09:28 12:39


 


Temperature 98.1 F  98.4 F


 


Pulse Rate 69 69 101 H


 


Respiratory 20  18





Rate   


 


Blood Pressure 143/91 143/91 137/84


 


O2 Sat by Pulse 96  90





Oximetry   














  08/04/19





  14:51


 


Temperature 


 


Pulse Rate 101 H


 


Respiratory 





Rate 


 


Blood Pressure 137/84


 


O2 Sat by Pulse 





Oximetry 














- Laboratory Findings


CBC and BMP: 


                                 07/30/19 05:32





                                 08/01/19 05:53


Abnormal Lab Findings: 


                                  Abnormal Labs











  07/30/19 07/30/19 07/30/19





  05:32 05:32 05:32


 


Hgb  15.3 H  


 


Hct  46.0 H  


 


Lymph % (Auto)  8.5 L  


 


Lymph #  0.8 L  


 


Seg Neutrophils %  88.0 H  


 


Seg Neutrophils #  7.9 H  


 


Chloride   


 


Creatinine   0.6 L 


 


Glucose   190 H 


 


Hemoglobin A1c   


 


Total Creatine Kinase    919 H


 


Albumin   


 


TSH   














  07/31/19 07/31/19 08/01/19





  08:30 15:35 05:53


 


Hgb   


 


Hct   


 


Lymph % (Auto)   


 


Lymph #   


 


Seg Neutrophils %   


 


Seg Neutrophils #   


 


Chloride    109.4 H


 


Creatinine   


 


Glucose    117 H


 


Hemoglobin A1c  6.3 H  


 


Total Creatine Kinase   


 


Albumin    3.1 L


 


TSH   4.640 H

## 2019-08-04 NOTE — DISCHARGE SUMMARY
Providers





- Providers


Date of Admission: 


07/30/19 09:13





Date of discharge: 08/04/19


Attending physician: 


AMY J KOCHERLA





                                        





07/30/19 10:56


Occupational Therapy Evaluate and Treat [CONS] Routine 


   Comment: 


   Reason For Exam: Neuro deficits


Physical Therapy Evaluation and Treat [CONS] Routine 


   Comment: 


   Reason For Exam: Neuro deficits





07/30/19 17:28


Speech Therapy Evaluation and Treat [CONS] Routine 


   Reason For Exam: Speech and Swallow evaln and treat





07/30/19 18:38


Consult to Dietitian/Nutrition [CONS] Routine 


   Physician Instructions: Assess nutrtn needs, initiate, modify, manage TF


   Reason For Exam: 


   Reason for Consult: Write/Manage Tube Feeding


   Reason for Consult: Write/Manage Tube Feeding





07/31/19 10:59


Consult to Physician [CONS] Routine 


   Comment: 


   Consulting Provider: EDIE WARE


   Physician Instructions: 


   Reason For Exam: Seizure/AMS/ CVA like symptoms











Primary care physician: 


[facility], [place]








Hospitalization


Condition: Fair


Disposition: DC-30 STILL A PATIENT





Exam





- Constitutional


Vitals: 


                                        











Temp Pulse Resp BP Pulse Ox


 


 98.1 F   69   20   143/91   96 


 


 08/04/19 06:06  08/04/19 06:06  08/04/19 06:06  08/04/19 06:06  08/04/19 06:06














Plan


Follow up with: 


[facility] [place] [Primary Care Provider] - 3-5 Days

## 2019-08-05 VITALS — DIASTOLIC BLOOD PRESSURE: 92 MMHG | SYSTOLIC BLOOD PRESSURE: 148 MMHG

## 2019-08-05 RX ADMIN — ASPIRIN SCH MG: 81 TABLET, CHEWABLE ORAL at 12:34

## 2019-08-05 RX ADMIN — ENOXAPARIN SODIUM SCH: 100 INJECTION SUBCUTANEOUS at 12:35

## 2019-08-05 RX ADMIN — LEVETIRACETAM SCH MG: 500 SOLUTION ORAL at 12:34

## 2019-08-05 RX ADMIN — FAMOTIDINE SCH MG: 10 INJECTION, SOLUTION INTRAVENOUS at 12:36

## 2019-08-05 RX ADMIN — HYDRALAZINE HYDROCHLORIDE SCH MG: 25 TABLET, FILM COATED ORAL at 06:09

## 2019-08-05 RX ADMIN — AMLODIPINE BESYLATE SCH MG: 5 TABLET ORAL at 12:34

## 2019-08-05 RX ADMIN — FAMOTIDINE SCH: 10 INJECTION, SOLUTION INTRAVENOUS at 12:40

## 2019-08-05 RX ADMIN — HYDRALAZINE HYDROCHLORIDE SCH MG: 25 TABLET, FILM COATED ORAL at 15:50

## 2019-08-05 NOTE — DISCHARGE SUMMARY
Providers





- Providers


Date of Admission: 


07/30/19 09:13





Date of discharge: 08/05/19


Attending physician: 


AMY J KOCHERLA





                                        





07/30/19 10:56


Occupational Therapy Evaluate and Treat [CONS] Routine 


   Comment: 


   Reason For Exam: Neuro deficits


Physical Therapy Evaluation and Treat [CONS] Routine 


   Comment: 


   Reason For Exam: Neuro deficits





07/30/19 17:28


Speech Therapy Evaluation and Treat [CONS] Routine 


   Reason For Exam: Speech and Swallow evaln and treat





07/30/19 18:38


Consult to Dietitian/Nutrition [CONS] Routine 


   Physician Instructions: Assess nutrtn needs, initiate, modify, manage TF


   Reason For Exam: 


   Reason for Consult: Write/Manage Tube Feeding


   Reason for Consult: Write/Manage Tube Feeding





07/31/19 10:59


Consult to Physician [CONS] Routine 


   Comment: 


   Consulting Provider: EDIE WARE


   Physician Instructions: 


   Reason For Exam: Seizure/AMS/ CVA like symptoms











Primary care physician: 


[facility], [place]








Hospitalization


Condition: Fair


Hospital course: 


--7 mm small acute cerebellar infarct / CVA;not present on admission.


7 mm acute infarct involving the right cerebellum on repeat MRI today 08/03/2019


markedly extensive microvascular angiopathy 


Not a candidate for TPA , continue aspirin and statin


Physical therapy occupational therapy


Neurology following





--Seizures


Current Visit: Yes   Status: Acute   


Complex partial epilepsy, nonintractable


Seizure precautions,keppra, Ativan as needed  


Neurology evaluation noted and appreciated , 


follow EEG , no driving





-- Acute metabolic encephalopathy: Present on admission


Current Visit: Yes   Status: Acute   


Initial Neuro Workup  negative, CVA ruled out, 


However repeat MRI 8/3/19  showed 7mm acute infarct.


Not a candidate for TPA  unknown time of infarct.


patient is more alert and awake today





-- Dementia/memory loss


Current Visit: Yes   Status: Acute   


Supportive care, neurology following





-- Hypertension


Current Visit:yes   Status: Chronic   


Closely monitor blood pressures, continue current management


When necessary hydralazine





--History of cerebral amyloid angiopathy :


h/o 2 episodes of bleeding in the remote past





--Increased somnolence:


Patient is more alert and awake today responding


Appropriately,more verbal and more awake





--DVT prophylaxis


Current Visit: Yes   Status: Acute renal 


Lovenox





--Full code status


Current Visit: Yes   Status: Acute  





Physical therapy occupational therapy .





I discussed the case with  Patient's daughter Pura [phone] findings on 

rpt MRI 


Treatment plan, reports of all imaging studies, neurology recommendations


and the discharge planning with  different options.


The daughter requested discharge home with home health when patient is stable


Discussed with  neuro .


Disposition;f/u PT OT evaln and DC home with [facility] as requested by the daughter.


When patient is medically stable








Disposition: DC/[place]-06 HOME UNDER HOME [facility]


Time spent for discharge: 32 min





Core Measure Documentation





- Palliative Care


Palliative Care/ Comfort Measures: Not Applicable





- Core Measures


Any of the following diagnoses?: stroke





- Stroke Discharge Requirements


Statin for LDL = or >70 mg/dl on DC: Yes


Anticoag for atrial fib/atrial flutter: Not Applicable


Reason for no anticoag for AF/F on DC: Not Indicated


Antithrombotic for ischemic stroke: Yes





Exam





- Constitutional


Vitals: 


                                        











Temp Pulse Resp BP Pulse Ox


 


 98.5 F   92 H  18   148/92   92 


 


 08/05/19 11:22  08/05/19 12:34  08/05/19 11:22  08/05/19 12:34  08/05/19 11:22











General appearance: Present: no acute distress, well-nourished





- EENT


Eyes: Present: PERRL, EOM intact





- Neck


Neck: Present: supple, normal ROM





- Respiratory


Respiratory effort: normal


Respiratory: bilateral: diminished, negative: rales, rhonchi, wheezing





- Cardiovascular


Rhythm: regular


Heart Sounds: Present: S1 & S2





- Extremities


Extremities: no ischemia, No edema





- Abdominal


General gastrointestinal: Present: soft, non-tender, non-distended, normal bowel

 sounds





- Integumentary


Integumentary: Present: clear, warm





- Musculoskeletal


Musculoskeletal: strength equal bilaterally





- Psychiatric


Psychiatric: appropriate mood/affect, cooperative





- Neurologic


Neurologic: moves all extremities, other (residual weakness)





Plan


Activity: advance as tolerated, no driving until cleared by PCP, fall 

precautions, other (seizure precautions)


Diet: other (cardiac diet)


Special Instructions: physical therapy, occupational therapy


Additional Instructions: seizure precautions.  Fall precautions.  Do not drive. 

 Drink plenty fluids


Follow up with: 


[facility] [place] [Primary Care Provider] - 3-5 Days


NAZ RUVALCABA MD [Staff Physician] - 7 Days


Prescriptions: 


hydrALAZINE [Apresoline TAB] 50 mg PO Q8HR #90 tablet


Aspirin [Aspirin BABY CHEW TAB] 81 mg PO QDAY #30 tab.chew


levETIRAcetam [Keppra] 1,500 mg PO BID #60 oral.liqd


AtorvaSTATin [Lipitor] 40 mg PO QHS #30 tablet


amLODIPine [Norvasc] 5 mg PO QDAY #30 tablet


Famotidine [Pepcid] 20 mg PO DAILY #30 tablet

## 2021-05-21 ENCOUNTER — HOSPITAL ENCOUNTER (INPATIENT)
Dept: HOSPITAL 5 - ED | Age: 71
LOS: 8 days | Discharge: HOSPICE | DRG: 871 | End: 2021-05-29
Attending: INTERNAL MEDICINE | Admitting: INTERNAL MEDICINE
Payer: MEDICARE

## 2021-05-21 DIAGNOSIS — A41.9: Primary | ICD-10-CM

## 2021-05-21 DIAGNOSIS — Z87.440: ICD-10-CM

## 2021-05-21 DIAGNOSIS — Z79.01: ICD-10-CM

## 2021-05-21 DIAGNOSIS — Z79.891: ICD-10-CM

## 2021-05-21 DIAGNOSIS — J18.9: ICD-10-CM

## 2021-05-21 DIAGNOSIS — J96.01: ICD-10-CM

## 2021-05-21 DIAGNOSIS — R47.01: ICD-10-CM

## 2021-05-21 DIAGNOSIS — K44.9: ICD-10-CM

## 2021-05-21 DIAGNOSIS — J44.9: ICD-10-CM

## 2021-05-21 DIAGNOSIS — Z20.822: ICD-10-CM

## 2021-05-21 DIAGNOSIS — F03.90: ICD-10-CM

## 2021-05-21 DIAGNOSIS — Z79.899: ICD-10-CM

## 2021-05-21 DIAGNOSIS — Z79.82: ICD-10-CM

## 2021-05-21 DIAGNOSIS — E78.5: ICD-10-CM

## 2021-05-21 DIAGNOSIS — G93.41: ICD-10-CM

## 2021-05-21 DIAGNOSIS — E87.6: ICD-10-CM

## 2021-05-21 DIAGNOSIS — I69.351: ICD-10-CM

## 2021-05-21 LAB
ALBUMIN SERPL-MCNC: 3.9 G/DL (ref 3.9–5)
ALT SERPL-CCNC: 17 UNITS/L (ref 7–56)
BASOPHILS # (AUTO): 0.1 K/MM3 (ref 0–0.1)
BASOPHILS NFR BLD AUTO: 0.8 % (ref 0–1.8)
BUN SERPL-MCNC: 10 MG/DL (ref 7–17)
BUN/CREAT SERPL: 20 %
CALCIUM SERPL-MCNC: 8.9 MG/DL (ref 8.4–10.2)
CRP SERPL-MCNC: 0 MG/DL (ref 0–1.3)
EOSINOPHIL # BLD AUTO: 0 K/MM3 (ref 0–0.4)
EOSINOPHIL NFR BLD AUTO: 0.1 % (ref 0–4.3)
HCT VFR BLD CALC: 41.7 % (ref 30.3–42.9)
HEMOLYSIS INDEX: 1
HGB BLD-MCNC: 14 GM/DL (ref 10.1–14.3)
LYMPHOCYTES # BLD AUTO: 1.5 K/MM3 (ref 1.2–5.4)
LYMPHOCYTES NFR BLD AUTO: 19.6 % (ref 13.4–35)
MCHC RBC AUTO-ENTMCNC: 34 % (ref 30–34)
MCV RBC AUTO: 92 FL (ref 79–97)
MONOCYTES # (AUTO): 0.4 K/MM3 (ref 0–0.8)
MONOCYTES % (AUTO): 5.1 % (ref 0–7.3)
PLATELET # BLD: 254 K/MM3 (ref 140–440)
RBC # BLD AUTO: 4.55 M/MM3 (ref 3.65–5.03)

## 2021-05-21 PROCEDURE — 83735 ASSAY OF MAGNESIUM: CPT

## 2021-05-21 PROCEDURE — 85730 THROMBOPLASTIN TIME PARTIAL: CPT

## 2021-05-21 PROCEDURE — 70450 CT HEAD/BRAIN W/O DYE: CPT

## 2021-05-21 PROCEDURE — 81001 URINALYSIS AUTO W/SCOPE: CPT

## 2021-05-21 PROCEDURE — 85018 HEMOGLOBIN: CPT

## 2021-05-21 PROCEDURE — 84145 PROCALCITONIN (PCT): CPT

## 2021-05-21 PROCEDURE — 84484 ASSAY OF TROPONIN QUANT: CPT

## 2021-05-21 PROCEDURE — 82947 ASSAY GLUCOSE BLOOD QUANT: CPT

## 2021-05-21 PROCEDURE — 87040 BLOOD CULTURE FOR BACTERIA: CPT

## 2021-05-21 PROCEDURE — 85379 FIBRIN DEGRADATION QUANT: CPT

## 2021-05-21 PROCEDURE — 86140 C-REACTIVE PROTEIN: CPT

## 2021-05-21 PROCEDURE — 80048 BASIC METABOLIC PNL TOTAL CA: CPT

## 2021-05-21 PROCEDURE — 70498 CT ANGIOGRAPHY NECK: CPT

## 2021-05-21 PROCEDURE — 82962 GLUCOSE BLOOD TEST: CPT

## 2021-05-21 PROCEDURE — 36415 COLL VENOUS BLD VENIPUNCTURE: CPT

## 2021-05-21 PROCEDURE — 96365 THER/PROPH/DIAG IV INF INIT: CPT

## 2021-05-21 PROCEDURE — 70551 MRI BRAIN STEM W/O DYE: CPT

## 2021-05-21 PROCEDURE — 83615 LACTATE (LD) (LDH) ENZYME: CPT

## 2021-05-21 PROCEDURE — 93005 ELECTROCARDIOGRAM TRACING: CPT

## 2021-05-21 PROCEDURE — 85027 COMPLETE CBC AUTOMATED: CPT

## 2021-05-21 PROCEDURE — 71045 X-RAY EXAM CHEST 1 VIEW: CPT

## 2021-05-21 PROCEDURE — 87086 URINE CULTURE/COLONY COUNT: CPT

## 2021-05-21 PROCEDURE — 85014 HEMATOCRIT: CPT

## 2021-05-21 PROCEDURE — 83036 HEMOGLOBIN GLYCOSYLATED A1C: CPT

## 2021-05-21 PROCEDURE — 80053 COMPREHEN METABOLIC PANEL: CPT

## 2021-05-21 PROCEDURE — U0003 INFECTIOUS AGENT DETECTION BY NUCLEIC ACID (DNA OR RNA); SEVERE ACUTE RESPIRATORY SYNDROME CORONAVIRUS 2 (SARS-COV-2) (CORONAVIRUS DISEASE [COVID-19]), AMPLIFIED PROBE TECHNIQUE, MAKING USE OF HIGH THROUGHPUT TECHNOLOGIES AS DESCRIBED BY CMS-2020-01-R: HCPCS

## 2021-05-21 PROCEDURE — 74018 RADEX ABDOMEN 1 VIEW: CPT

## 2021-05-21 PROCEDURE — 85610 PROTHROMBIN TIME: CPT

## 2021-05-21 PROCEDURE — 83880 ASSAY OF NATRIURETIC PEPTIDE: CPT

## 2021-05-21 PROCEDURE — 84132 ASSAY OF SERUM POTASSIUM: CPT

## 2021-05-21 PROCEDURE — 82140 ASSAY OF AMMONIA: CPT

## 2021-05-21 PROCEDURE — 85025 COMPLETE CBC W/AUTO DIFF WBC: CPT

## 2021-05-21 PROCEDURE — 70496 CT ANGIOGRAPHY HEAD: CPT

## 2021-05-21 PROCEDURE — 96375 TX/PRO/DX INJ NEW DRUG ADDON: CPT

## 2021-05-21 PROCEDURE — 82728 ASSAY OF FERRITIN: CPT

## 2021-05-21 PROCEDURE — 95819 EEG AWAKE AND ASLEEP: CPT

## 2021-05-21 RX ADMIN — FAMOTIDINE SCH MG: 10 INJECTION, SOLUTION INTRAVENOUS at 23:41

## 2021-05-21 RX ADMIN — DEXTROSE AND SODIUM CHLORIDE SCH MLS/HR: 5; .9 INJECTION, SOLUTION INTRAVENOUS at 23:42

## 2021-05-21 NOTE — HISTORY AND PHYSICAL REPORT
History of Present Illness


Date of examination: 05/21/21


Date of admission: 


05/21/21 19:37





Chief complaint: 


Fever and altered sensorium since last night.





History of present illness: 


70-year-old female with past medical history of hemorrhagic CVA with residual 

right-sided weakness, dementia, COPD on 2 L nasal cannula oxygen brought in by 

family for altered mental status since last night.  Also patient has been having

fever.  Patient had a temperature 103.5.  Patient with altered sensorium.  

Patient received first dose of Covid vaccination 3 weeks ago but is due for the 

second dose 1 week later.  No one at home is having fever any infections.  

Patient has become normal verbal and coughing and desaturating.  No exacerbating

or precipitating factors.  The main problem is fever and desaturation and 

altered mental status.  Chest x-ray in the ER revealed right-sided pneumonia 

which is developing.





- Past Medical History


-- Hypertension: Yes


--CVA: Yes (hemorragic cva 9/2015)


--COPD: Yes


--Additional medical history: bells palsy, dementia





 -Past surgical history


 not available





- Social History  


--Smoking Status: Unknown if ever smoked  





-Family History


Htn





- Medications


Home Medications: 


                                Home Medications











 Medication  Instructions  Recorded  Confirmed  Last Taken  Type


 


Aspirin [Aspirin BABY CHEW TAB] 81 mg PO QDAY #30 tab.chew 08/05/19 05/21/21 05/21/21 Rx


 


amLODIPine 5 mg PO QDAY #30 tablet 08/05/19 05/21/21 05/21/21 Rx


 


hydrALAZINE [Apresoline TAB] 50 mg PO Q8HR #90 tablet 08/05/19 05/21/21 05/21/21

Rx


 


AtorvaSTATin [Lipitor] 80 mg PO DAILY 05/21/21 05/21/21 05/21/21 History














Review of Systems


ROS: 


Constitutional altered sensorium


HEENT no sore throat no post nasal drip no diplopia


Neck no neck stiffness no lymph gland enlargement


Chest and lungs no shortness of breath cough or wheezing


CVS no chest pain no diaphoresis no palpitations


GI no nausea no vomiting no diarrhea


Genitourinary system no dysuria no flank pain


Musculoskeletal system no muscle pains no joint pains


CNS right-sided weakness


Skin no rash no itching


Psychiatric no depression no homicidal or suicidal tendencies


Hematologic no lymphedema or bruising


Endocrine no polydipsia no polyuria no cold intolerance no heat intolerance





Medications and Allergies


                                    Allergies











Allergy/AdvReac Type Severity Reaction Status Date / Time


 


No Known Allergies Allergy   Verified 09/18/15 07:34











                                Home Medications











 Medication  Instructions  Recorded  Confirmed  Last Taken  Type


 


Aspirin [Aspirin BABY CHEW TAB] 81 mg PO QDAY #30 tab.chew 08/05/19 05/21/21 05/21/21 Rx


 


amLODIPine 5 mg PO QDAY #30 tablet 08/05/19 05/21/21 05/21/21 Rx


 


hydrALAZINE [Apresoline TAB] 50 mg PO Q8HR #90 tablet 08/05/19 05/21/21 05/21/21

Rx


 


AtorvaSTATin [Lipitor] 80 mg PO DAILY 05/21/21 05/21/21 05/21/21 History











Active Meds: 


Active Medications





Acetaminophen (Acetaminophen 325 Mg Tab)  650 mg PO Q4H PRN


   PRN Reason: Pain MILD(1-3)/Fever >100.5/HA


Amlodipine Besylate (Amlodipine 5 Mg Tab)  5 mg PO QDAY TOMMY


Aspirin (Aspirin 81 Mg Tab Chew)  81 mg PO QDAY TOMMY


Atorvastatin Calcium (Atorvastatin 40 Mg Tab)  80 mg PO DAILY TOMMY


Famotidine (Famotidine 20 Mg/2 Ml Inj)  20 mg IV BID TOMMY


Hydralazine HCl (Hydralazine 25 Mg Tab)  50 mg PO Q8HR TOMMY


Vancomycin HCl 1,250 mg/ (Sodium Chloride)  275 mls @ 137.5 mls/hr IV Q24H TOMMY


Dextrose/Sodium Chloride (D5ns)  1,000 mls @ 125 mls/hr IV AS DIRECT TOMMY


Metoclopramide HCl (Metoclopramide 10 Mg/2 Ml Inj)  10 mg IV Q6H PRN


   PRN Reason: Nausea And Vomiting


Morphine Sulfate (Morphine 2 Mg/1 Ml Inj)  2 mg IV Q4H PRN


   PRN Reason: Pain, Moderate (4-6)


Ondansetron HCl (Ondansetron 4 Mg/2 Ml Inj)  4 mg IV Q8H PRN


   PRN Reason: Nausea And Vomiting


Sodium Chloride (Sodium Chloride 0.9% 10 Ml Flush Syringe)  10 ml IV BID [facility]


Sodium Chloride (Sodium Chloride 0.9% 10 Ml Flush Syringe)  10 ml IV PRN PRN


   PRN Reason: LINE FLUSH











Exam





- Constitutional


Vitals: 


                                        











Temp Pulse Resp BP Pulse Ox


 


 100.1 F H  93 H  30 H  127/82   97 


 


 05/21/21 16:51  05/21/21 20:00  05/21/21 20:00  05/21/21 20:00  05/21/21 20:00











General appearance: Present: no acute distress, well-nourished





- EENT


Eyes: Present: PERRL


ENT: hearing intact, clear oral mucosa





- Neck


Neck: Present: supple, normal ROM





- Respiratory


Respiratory effort: normal


Respiratory: bilateral: CTA





- Cardiovascular


Heart rate: 78


Rhythm: regular


Heart Sounds: Present: S1 & S2.  Absent: rub, click





- Extremities


Extremities: no ischemia, pulses symmetrical, No edema, abnormal (Contractures)


Peripheral Pulses: within normal limits





- Abdominal


General gastrointestinal: Present: soft, non-tender, non-distended, normal bowel

 sounds


Female genitourinary: Present: normal





- Integumentary


Integumentary: Present: clear, warm, dry





- Musculoskeletal


Musculoskeletal: right sided weakness





- Psychiatric


Psychiatric: other (Altered sensorium )





- Neurologic


Neurologic: CNII-XII intact, moves all extremities





- Allied Health


Allied health notes reviewed: nursing, case management





HEART Score





- HEART Score


Troponin: 


                                        











Troponin T  < 0.010 ng/mL (0.00-0.029)   05/21/21  Unknown














Results





- Labs


CBC & Chem 7: 


                                 05/21/21 17:55





                                05/21/21 Unknown


Labs: 


                             Laboratory Last Values











WBC  7.8 K/mm3 (4.5-11.0)   05/21/21  17:55    


 


RBC  4.55 M/mm3 (3.65-5.03)   05/21/21  17:55    


 


Hgb  14.0 gm/dl (10.1-14.3)   05/21/21  17:55    


 


Hct  41.7 % (30.3-42.9)   05/21/21  17:55    


 


MCV  92 fl (79-97)   05/21/21  17:55    


 


MCH  31 pg (28-32)   05/21/21  17:55    


 


MCHC  34 % (30-34)   05/21/21  17:55    


 


RDW  14.7 % (13.2-15.2)   05/21/21  17:55    


 


Plt Count  254 K/mm3 (140-440)   05/21/21  17:55    


 


Lymph % (Auto)  19.6 % (13.4-35.0)   05/21/21  17:55    


 


Mono % (Auto)  5.1 % (0.0-7.3)   05/21/21  17:55    


 


Eos % (Auto)  0.1 % (0.0-4.3)   05/21/21  17:55    


 


Baso % (Auto)  0.8 % (0.0-1.8)   05/21/21  17:55    


 


Lymph # (Auto)  1.5 K/mm3 (1.2-5.4)   05/21/21  17:55    


 


Mono # (Auto)  0.4 K/mm3 (0.0-0.8)   05/21/21  17:55    


 


Eos # (Auto)  0.0 K/mm3 (0.0-0.4)   05/21/21  17:55    


 


Baso # (Auto)  0.1 K/mm3 (0.0-0.1)   05/21/21  17:55    


 


Seg Neutrophils %  74.4 % (40.0-70.0)  H  05/21/21  17:55    


 


Seg Neutrophils #  5.8 K/mm3 (1.8-7.7)   05/21/21  17:55    


 


APTT  31.1 Sec. (24.2-36.6)   05/21/21  17:55    


 


D-Dimer  373.6 ng/mlDDU (0-234)  H  05/21/21  Unknown


 


Sodium  137 mmol/L (137-145)   05/21/21  17:55    


 


Potassium  3.9 mmol/L (3.6-5.0)   05/21/21  17:55    


 


Chloride  101.6 mmol/L ()   05/21/21  17:55    


 


Carbon Dioxide  27 mmol/L (22-30)   05/21/21  17:55    


 


Anion Gap  12 mmol/L  05/21/21  17:55    


 


BUN  10 mg/dL (7-17)   05/21/21  17:55    


 


Creatinine  0.5 mg/dL (0.6-1.2)  L  05/21/21  17:55    


 


Estimated GFR  > 60 ml/min  05/21/21  17:55    


 


BUN/Creatinine Ratio  20 %  05/21/21  17:55    


 


Glucose  100 mg/dL ()   05/21/21  Unknown


 


Lactic Acid  0.70 mmol/L (0.7-2.0)   05/21/21  20:36    


 


Calcium  8.9 mg/dL (8.4-10.2)   05/21/21  17:55    


 


Ferritin  382.5 ng/mL (10.0-200.0)  H  05/21/21  Unknown


 


Total Bilirubin  0.60 mg/dL (0.1-1.2)   05/21/21  17:55    


 


AST  19 units/L (5-40)   05/21/21  17:55    


 


ALT  17 units/L (7-56)   05/21/21  17:55    


 


Alkaline Phosphatase  82 units/L ()   05/21/21  17:55    


 


Lactate Dehydrogenase  180 units/L ()   05/21/21  Unknown


 


Troponin T  < 0.010 ng/mL (0.00-0.029)   05/21/21  Unknown


 


C-Reactive Protein  0.00 mg/dL (0.00-1.30)   05/21/21  Unknown


 


NT-Pro-B Natriuret Pep  106.8 pg/mL (0-900)   05/21/21  Unknown


 


Total Protein  7.4 g/dL (6.3-8.2)   05/21/21  17:55    


 


Albumin  3.9 g/dL (3.9-5)   05/21/21  17:55    


 


Albumin/Globulin Ratio  1.1 %  05/21/21  17:55    











Microbiology: 


Microbiology





05/21/21 17:55   Peripheral/Venous   Blood Culture - Preliminary


                            Culture in Progress


05/21/21 17:55   Peripheral/Venous   Blood Culture - Preliminary


                            Culture in Progress











- Imaging and Cardiology


EKG: report reviewed (Sinus rhythm, heart rate of 97, left axis deviation, 

anteroseptal infarct age indeterminate.)


Chest x-ray: report reviewed


Imaging and Cardiology: 





Chest x-ray


Focal opacity in the right retrocardiac space which could represent aspiration 

or developing pneumonia.  Recommend continued follow-up.





Assessment and Plan


Advance Directives: Yes (Full code)


VTE prophylaxis?: Chemical


Plan of care discussed with patient/family: Yes





- Patient Problems


(1) Acute metabolic encephalopathy


Current Visit: No   Status: Acute   


Plan to address problem: 


Secondary to respiratory failure hypoxia and pneumonia


IV fluids and IV antibiotics


Check head CT








(2) Acute respiratory failure with hypoxia


Current Visit: Yes   Status: Acute   


Plan to address problem: 


Patient is oxygen saturations are better on 4 L nasal cannula oxygen








(3) Pneumonia


Current Visit: Yes   Status: Acute   


Plan to address problem: 


IV ceftriaxone and IV azithromycin for now


Also rule out Covid infection








(4) Sepsis


Current Visit: Yes   Status: Acute   


Plan to address problem: 


IV Rocephin and IV Zithromax


Repeat chest x-ray in 48 to 72 hours








(5) CVA, old, cognitive deficits


Current Visit: Yes   Status: Chronic   


Plan to address problem: 


Supportive care


Repeat head CT because of altered sensorium








(6) Hypertension


Current Visit: Yes   Status: Chronic   


Qualifiers: 


   Hypertension type: essential hypertension   Qualified Code(s): I10 - 

Essential (primary) hypertension   


Plan to address problem: 


Continue antihypertensives and adjust medications








(7) Hyperlipidemia


Current Visit: Yes   Status: Chronic   


Qualifiers: 


   Hyperlipidemia type: mixed hyperlipidemia   Qualified Code(s): E78.2 - Mixed 

hyperlipidemia   


Plan to address problem: 


Continue statins








(8) DVT prophylaxis


Current Visit: Yes   Status: Acute   


Plan to address problem: 


On heparin and GI prophylaxis

## 2021-05-21 NOTE — EMERGENCY DEPARTMENT REPORT
HPI





- General


Chief Complaint: Altered Mental Status


PUI?: Yes


Time Seen by Provider: 05/21/21 17:45





- HPI


HPI: 





70-year-old female with past medical history significant for prior hemorrhagic 

CVA with residual right-sided weakness, dementia, and COPD on 2 L of oxygen at 

home brought in by family due to altered mental status since last night as well 

as fever discovered this morning.  According to the patient's daughter at the [facility], last night around midnight she became altered and nonverbal.  The patient

was coughing all night long and despite using supplemental oxygen she desatted 

to the mid 80s occasionally.  In the morning when they measured her temperature 

it was 103.4.  She received the first dose of the Covid vaccination 3 weeks ago 

but has not yet gotten the second dose.  No one at home is sick currently.  Othe

r than becoming nonverbal and coughing/desatting, the patient has not displayed 

any other symptoms or complaint of chest pain, abdominal pain, nausea, dysuria, 

frequency, or any other complaints.  Further details of the HPI are limited due 

to the patient's clinical condition.





ED Past Medical Hx





- Past Medical History


Hx Hypertension: Yes


Hx CVA: Yes (hemorragic cva 9/2015)


Hx COPD: Yes


Hx Dementia: No


Hx HIV: No


Additional medical history: bells palsy, dementia





- Social History


Smoking Status: Unknown if ever smoked





- Medications


Home Medications: 


                                Home Medications











 Medication  Instructions  Recorded  Confirmed  Last Taken  Type


 


Aspirin [Aspirin BABY CHEW TAB] 81 mg PO QDAY #30 tab.chew 08/05/19 05/21/21 05/21/21 Rx


 


amLODIPine 5 mg PO QDAY #30 tablet 08/05/19 05/21/21 05/21/21 Rx


 


hydrALAZINE [Apresoline TAB] 50 mg PO Q8HR #90 tablet 08/05/19 05/21/21 05/21/21

Rx


 


AtorvaSTATin [Lipitor] 80 mg PO DAILY 05/21/21 05/21/21 05/21/21 History














ED Review of Systems


ROS: 


Stated complaint: ALTERED MENTAL STATUS


Other details as noted in HPI





Comment: Unobtainable due to pts medical conditions





Physical Exam





- Physical Exam


Vital Signs: 


                                   Vital Signs











  05/21/21 05/21/21 05/21/21





  16:51 17:30 18:41


 


Temperature 100.1 F H  


 


Pulse Rate 120 H 101 H 98 H


 


Respiratory 16 12 





Rate   


 


Blood Pressure 140/86 143/85 


 


O2 Sat by Pulse 100 100 





Oximetry   











Physical Exam: 





GENERAL: Nonverbal.


HEENT: Normocephalic. Atratumatic. Dry mucous membranes


EYES: Extraocular movements are intact. Pupils are equal round and reactive to 

light bilaterally


NECK: Supple. Trachea is midline.


LUNGS: Tachypneic in 30s. Equal chest rise bilaterally.  There are scattered 

rales bilaterally but no wheezes or rhonchi.


HEART/CARDIOVASCULAR: Regular rate and rhythm. No murmurs or rubs.


ABDOMEN: Abdomen is soft and nondistended. Normal bowel sounds. No significant 

tenderness, guarding or rebound.


SKIN: Skin is warm and dry


NEURO: Patient is awake but nonverbal. Not following commands. Moving all four 

extremities with increased rigidity throughout. No significant facial asymmetry


MUSCULOSKELETAL: There are no tenderness or deformity noted, although exam ois 

limited by patient's altered mental status. 





ED Course


                                   Vital Signs











  05/21/21 05/21/21 05/21/21





  16:51 17:30 18:41


 


Temperature 100.1 F H  


 


Pulse Rate 120 H 101 H 98 H


 


Respiratory 16 12 





Rate   


 


Blood Pressure 140/86 143/85 


 


O2 Sat by Pulse 100 100 





Oximetry   














- Reevaluation(s)


Reevaluation #1: 





05/21/21 19:43


Vitals stable with improvement of HR to 90s. Satting % on 4L.  She is able

 to answer some questions by nodding her head yes or no but refuses to speak 

still.





ED Medical Decision Making





- Lab Data


Result diagrams: 


                                 05/21/21 17:55





                                05/21/21 Unknown





                         Laboratory Results - last 24 hr











  05/21/21 05/21/21 05/21/21





  17:55 17:55 17:55


 


WBC   7.8 


 


RBC   4.55 


 


Hgb   14.0 


 


Hct   41.7 


 


MCV   92 


 


MCH   31 


 


MCHC   34 


 


RDW   14.7 


 


Plt Count   254 


 


Lymph % (Auto)   19.6 


 


Mono % (Auto)   5.1 


 


Eos % (Auto)   0.1 


 


Baso % (Auto)   0.8 


 


Lymph # (Auto)   1.5 


 


Mono # (Auto)   0.4 


 


Eos # (Auto)   0.0 


 


Baso # (Auto)   0.1 


 


Seg Neutrophils %   74.4 H 


 


Seg Neutrophils #   5.8 


 


APTT  31.1  





 


D-Dimer   


 


Sodium    137


 


Potassium    3.9


 


Chloride    101.6


 


Carbon Dioxide    27


 


Anion Gap    12


 


BUN    10


 


Creatinine    0.5 L


 


Estimated GFR    > 60


 


BUN/Creatinine Ratio    20


 


Glucose    102 H


 


Lactic Acid   


 


Calcium    8.9


 


Ferritin   


 


Total Bilirubin    0.60


 


AST    19


 


ALT    17


 


Alkaline Phosphatase    82


 


Lactate Dehydrogenase   


 


Troponin T   


 


C-Reactive Protein   


 


NT-Pro-B Natriuret Pep   


 


Total Protein    7.4


 


Albumin    3.9


 


Albumin/Globulin Ratio    1.1














  05/21/21 05/21/21 05/21/21





  17:55 Unknown Unknown


 


WBC   


 


RBC   


 


Hgb   


 


Hct   


 


MCV   


 


MCH   


 


MCHC   


 


RDW   


 


Plt Count   


 


Lymph % (Auto)   


 


Mono % (Auto)   


 


Eos % (Auto)   


 


Baso % (Auto)   


 


Lymph # (Auto)   


 


Mono # (Auto)   


 


Eos # (Auto)   


 


Baso # (Auto)   


 


Seg Neutrophils %   


 


Seg Neutrophils #   


 


APTT   


 


D-Dimer   373.6 H 


 


Sodium   


 


Potassium   


 


Chloride   


 


Carbon Dioxide   


 


Anion Gap   


 


BUN   


 


Creatinine   


 


Estimated GFR   


 


BUN/Creatinine Ratio   


 


Glucose    100


 


Lactic Acid  0.90  


 


Calcium   


 


Ferritin   


 


Total Bilirubin   


 


AST   


 


ALT   


 


Alkaline Phosphatase   


 


Lactate Dehydrogenase    180


 


Troponin T   


 


C-Reactive Protein    0.00


 


NT-Pro-B Natriuret Pep   


 


Total Protein   


 


Albumin   


 


Albumin/Globulin Ratio   














  05/21/21 05/21/21





  Unknown Unknown


 


WBC  


 


RBC  


 


Hgb  


 


Hct  


 


MCV  


 


MCH  


 


MCHC  


 


RDW  


 


Plt Count  


 


Lymph % (Auto)  


 


Mono % (Auto)  


 


Eos % (Auto)  


 


Baso % (Auto)  


 


Lymph # (Auto)  


 


Mono # (Auto)  


 


Eos # (Auto)  


 


Baso # (Auto)  


 


Seg Neutrophils %  


 


Seg Neutrophils #  


 


APTT  


 


D-Dimer  


 


Sodium  


 


Potassium  


 


Chloride  


 


Carbon Dioxide  


 


Anion Gap  


 


BUN  


 


Creatinine  


 


Estimated GFR  


 


BUN/Creatinine Ratio  


 


Glucose  


 


Lactic Acid  


 


Calcium  


 


Ferritin  382.5 H 


 


Total Bilirubin  


 


AST  


 


ALT  


 


Alkaline Phosphatase  


 


Lactate Dehydrogenase  


 


Troponin T   < 0.010


 


C-Reactive Protein  


 


NT-Pro-B Natriuret Pep   106.8


 


Total Protein  


 


Albumin  


 


Albumin/Globulin Ratio  














- EKG Data


-: EKG Interpreted by Me


EKG shows normal: sinus rhythm


Rate: normal





- EKG Data





05/21/21 20:24


Normal sinus rhythm.  Left axis deviation.  Normal intervals.  No significant ST

 segment or T wave abnormalities.





- Radiology Data





CHEST 1 VIEW 5/21/2021 5:17 PM  INDICATION / CLINICAL INFORMATION: sepsis.  

COMPARISON: 7/30/2019  FINDINGS:  SUPPORT DEVICES: None. HEART / MEDIASTINUM: 

Stable. LUNGS / PLEURA: Mild scattered bibasilar linear opacities suggestive of 

atelectasis. There is a slightly more focal opacity in the right retrocardiac s

pace. Finding could represent developing pneumonia or aspiration. No 

pneumothorax.  ADDITIONAL FINDINGS: No significant additional findings.  

IMPRESSION: 1. Focal opacity in the right retrocardiac space which could 

represent aspiration or developing pneumonia. Recommend continued follow-up.  

Signer Name: Kyler Tirado MD Signed: 5/21/2021 5:25 PM Workstation Name: 

[id number]





- Medical Decision Making





70-year-old female with history of prior CVA with right-sided weakness, 

dementia, and COPD on 2 L of supplemental oxygen brought in by EMS after the 

patient became altered and nonverbal around midnight last night and then spiked 

a fever of 103.4 this morning.  According to the daughter, she was coughing all 

night long and desatted at various points to the mid 80s despite her 

supplemental oxygen.  On initial assessment, the patient is noted to be 

tachycardic in the 120s and slightly hypertensive.  She is satting in the high 

90s on 4 L nasal cannula.  Physical exam is notable for an elderly female who is

 nonverbal and not following commands.  She has increased rigidity throughout 

but no focal neurologic deficits, although this is difficult to assess given her

 altered mental status.  Lung auscultation reveals scattered rales throughout 

without any wheezing.  Code sepsis was initiated with a full set of labs, blood 

cultures, and urine culture.  Will administer 30 mL/kg of IV fluid and broad-

spectrum vancomycin and cefepime.  In addition, because there is a possible 

respiratory source we will order COVID-19 PCR test and labs associated with this

 order.  





Chest x-ray shows some scattered increased interstitial markings as well as 

retrocardiac opacity most consistent with pneumonia especially given the 

clinical context.  Labs are fairly unremarkable with the exception of elevated 

D-dimer which was not ordered to assess for evidence of DVT/PE but was ordered 

along with COVID-19 testing.  The patient will be admitted to telemetry for 

further observation and management.


Critical care attestation.: 


If time is entered above; I have spent that time in minutes in the direct care 

of this critically ill patient, excluding procedure time.








ED Disposition


Clinical Impression: 


 Sepsis, Pneumonia, Hypoxia, Altered mental status





Disposition: [id number] OP ADMIT IP TO THIS HOSP


Is pt being admited?: Yes


Condition: Stable

## 2021-05-22 LAB
ALBUMIN SERPL-MCNC: 3.7 G/DL (ref 3.9–5)
ALT SERPL-CCNC: 14 UNITS/L (ref 7–56)
BASOPHILS # (AUTO): 0.1 K/MM3 (ref 0–0.1)
BASOPHILS NFR BLD AUTO: 0.9 % (ref 0–1.8)
BILIRUB UR QL STRIP: (no result)
BLOOD UR QL VISUAL: (no result)
BUN SERPL-MCNC: 6 MG/DL (ref 7–17)
BUN/CREAT SERPL: 12 %
CALCIUM SERPL-MCNC: 8.5 MG/DL (ref 8.4–10.2)
EOSINOPHIL # BLD AUTO: 0 K/MM3 (ref 0–0.4)
EOSINOPHIL NFR BLD AUTO: 0.1 % (ref 0–4.3)
HCT VFR BLD CALC: 38.7 % (ref 30.3–42.9)
HEMOLYSIS INDEX: 4
HGB BLD-MCNC: 13.2 GM/DL (ref 10.1–14.3)
LYMPHOCYTES # BLD AUTO: 1.5 K/MM3 (ref 1.2–5.4)
LYMPHOCYTES NFR BLD AUTO: 16.8 % (ref 13.4–35)
MCHC RBC AUTO-ENTMCNC: 34 % (ref 30–34)
MCV RBC AUTO: 91 FL (ref 79–97)
MONOCYTES # (AUTO): 0.7 K/MM3 (ref 0–0.8)
MONOCYTES % (AUTO): 7.4 % (ref 0–7.3)
MUCOUS THREADS #/AREA URNS HPF: (no result) /HPF
PH UR STRIP: 6 [PH] (ref 5–7)
PLATELET # BLD: 250 K/MM3 (ref 140–440)
PROT UR STRIP-MCNC: (no result) MG/DL
RBC # BLD AUTO: 4.27 M/MM3 (ref 3.65–5.03)
RBC #/AREA URNS HPF: < 1 /HPF (ref 0–6)
UROBILINOGEN UR-MCNC: < 2 MG/DL (ref ?–2)
WBC #/AREA URNS HPF: 1 /HPF (ref 0–6)

## 2021-05-22 RX ADMIN — ACETAMINOPHEN PRN MG: 325 SUPPOSITORY RECTAL at 18:03

## 2021-05-22 RX ADMIN — DEXTROSE AND SODIUM CHLORIDE SCH MLS/HR: 5; .9 INJECTION, SOLUTION INTRAVENOUS at 11:51

## 2021-05-22 RX ADMIN — Medication SCH ML: at 22:34

## 2021-05-22 RX ADMIN — ACETAMINOPHEN PRN MG: 325 SUPPOSITORY RECTAL at 11:50

## 2021-05-22 RX ADMIN — FAMOTIDINE SCH MG: 10 INJECTION, SOLUTION INTRAVENOUS at 22:34

## 2021-05-22 RX ADMIN — DEXTROSE AND SODIUM CHLORIDE SCH MLS/HR: 5; .9 INJECTION, SOLUTION INTRAVENOUS at 22:36

## 2021-05-22 RX ADMIN — ACETAMINOPHEN PRN MG: 325 TABLET ORAL at 09:46

## 2021-05-22 RX ADMIN — FAMOTIDINE SCH MG: 10 INJECTION, SOLUTION INTRAVENOUS at 09:46

## 2021-05-22 RX ADMIN — CEFEPIME SCH MLS/HR: 2 INJECTION, POWDER, FOR SOLUTION INTRAVENOUS at 16:59

## 2021-05-22 RX ADMIN — VANCOMYCIN HYDROCHLORIDE SCH MLS/HR: 5 INJECTION, POWDER, LYOPHILIZED, FOR SOLUTION INTRAVENOUS at 17:50

## 2021-05-22 RX ADMIN — Medication SCH ML: at 09:47

## 2021-05-22 RX ADMIN — CEFEPIME SCH MLS/HR: 2 INJECTION, POWDER, FOR SOLUTION INTRAVENOUS at 06:23

## 2021-05-22 RX ADMIN — ASPIRIN SCH: 81 TABLET, CHEWABLE ORAL at 10:12

## 2021-05-22 RX ADMIN — ASPIRIN SCH MG: 81 TABLET, CHEWABLE ORAL at 09:46

## 2021-05-22 NOTE — CAT SCAN REPORT
CT head/brain wo con



INDICATION:

Rec Seizures.



TECHNIQUE: Routine CT head. All CT scans at this location are performed using CT dose reduction for A
MELODY by means of automated exposure control.



COMPARISON: 

MRI brain 8/3/2019



FINDINGS:



Intracranial: Gray-white matter differentiation is maintained. No intracranial hemorrhage. No extra a
xial collection. No hydrocephalus.. Periventricular and centrum semiovale white matter hypoattenuatio
n most consistent with sequela of chronic microvascular disease. This correlates to the T2 signal hyp
erintensity seen on prior MRI. No herniation.

Sinuses: Mastoid effusion..

Orbits: Globes are intact. 

Calvarium: No acute fracture.





IMPRESSION:

1.  No acute intracranial abnormality.



Signer Name: Zachary Baldwin MD 

Signed: 5/22/2021 9:43 AM

Workstation Name: [id number]

## 2021-05-22 NOTE — PROGRESS NOTE
Assessment and Plan


Assessment and plan: 





Assessment and plan


Advance Directives: Yes (Full code)


VTE prophylaxis?: Chemical


Plan of care discussed with patient/family: Yes





- Patient Problems


(1) Acute metabolic encephalopathy


Current Visit: No   Status: Acute   


Plan to address problem: 


Secondary to respiratory failure hypoxia and pneumonia


IV fluids and IV antibiotics


Check head CT








(2) Acute respiratory failure with hypoxia


Current Visit: Yes   Status: Acute   


Plan to address problem: 


Currently on 4 L of nasal cannula oxygen


Continue oxygen supplementation








(3) Pneumonia, community-acquired


Current Visit: Yes   Status: Acute   


Plan to address problem: 


IV ceftriaxone and IV azithromycin for now


Also rule out Covid infection








(4) Sepsis secondary to committee acquired pneumonia


Current Visit: Yes   Status: Acute   


Plan to address problem: 


IV Rocephin and IV Zithromax











(5) CVA, old, cognitive deficits


Current Visit: Yes   Status: Chronic   


Plan to address problem: 


Supportive care


Repeat CT of the head








(6) Hypertension


Current Visit: Yes   Status: Chronic   


Qualifiers: 


   Hypertension type: essential hypertension   Qualified Code(s): I10 - 

Essential (primary) hypertension   


Plan to address problem: 


Continue amlodipine


IV hydralazine with parameters








(7) Hyperlipidemia


Current Visit: Yes   Status: Chronic   


Qualifiers: 


   Hyperlipidemia type: mixed hyperlipidemia   Qualified Code(s): E78.2 - Mixed 

hyperlipidemia   


Plan to address problem: 


Continue statins








(8) DVT prophylaxis


Current Visit: Yes   Status: Acute   


Plan to address problem: 


On heparin and GI prophylaxis





CODE STATUS: Full





Disposition: Continue treatment for pneumonia and rule out Covid infection.





History


Interval history: 





5/22/2021: Patient seen and examined, patient is nonverbal, looking around the 

room, no distress.  Spoke with the nurse today, patient is not taking any oral 

medication or eating.  Patient febrile overnight.





Hospitalist Physical





- Physical exam


Narrative exam: 








General appearance: no acute distress, well-nourished


EENT: PERRL, EOM intact, hearing intact, dry oral mucosa


Neck: Present: supple, normal ROM


Respiratory: bilateral CTA, negative: rales, rhonchi, wheezing


Cardiovascular: Regular rate/rhythm, Normal S1 & S2. No gallop, rub


Extremities: no ischemia, No edema, normal temperature, normal color


Abdominal: soft, no tenderness, non-distended, normal bowel sounds


Integumentary: Present: clear, warm, dry no wounds, no erythema noted


Psychiatric: appropriate mood/affect, intact judgment & insight


Neurologic: CNII-XII intact, moves all extremities, no sensory or motor 

abnormalities











- Constitutional


Vitals: 


                                        











Temp Pulse Resp BP Pulse Ox


 


 101.7 F H  96 H  18   149/77   90 


 


 05/22/21 09:49  05/22/21 10:11  05/22/21 05:34  05/22/21 10:11  05/22/21 09:49











General appearance: Present: no acute distress, well-nourished





HEART Score





- HEART Score


Troponin: 


                                        











Troponin T  < 0.010 ng/mL (0.00-0.029)   05/21/21  Unknown














Results





- Labs


CBC & Chem 7: 


                                 05/22/21 06:38





                                 05/22/21 06:38


Labs: 


                             Laboratory Last Values











WBC  9.0 K/mm3 (4.5-11.0)   05/22/21  06:38    


 


RBC  4.27 M/mm3 (3.65-5.03)   05/22/21  06:38    


 


Hgb  13.2 gm/dl (10.1-14.3)   05/22/21  06:38    


 


Hct  38.7 % (30.3-42.9)   05/22/21  06:38    


 


MCV  91 fl (79-97)   05/22/21  06:38    


 


MCH  31 pg (28-32)   05/22/21  06:38    


 


MCHC  34 % (30-34)   05/22/21  06:38    


 


RDW  14.3 % (13.2-15.2)   05/22/21  06:38    


 


Plt Count  250 K/mm3 (140-440)   05/22/21  06:38    


 


Lymph % (Auto)  16.8 % (13.4-35.0)   05/22/21  06:38    


 


Mono % (Auto)  7.4 % (0.0-7.3)  H  05/22/21  06:38    


 


Eos % (Auto)  0.1 % (0.0-4.3)   05/22/21  06:38    


 


Baso % (Auto)  0.9 % (0.0-1.8)   05/22/21  06:38    


 


Lymph # (Auto)  1.5 K/mm3 (1.2-5.4)   05/22/21  06:38    


 


Mono # (Auto)  0.7 K/mm3 (0.0-0.8)   05/22/21  06:38    


 


Eos # (Auto)  0.0 K/mm3 (0.0-0.4)   05/22/21  06:38    


 


Baso # (Auto)  0.1 K/mm3 (0.0-0.1)   05/22/21  06:38    


 


Seg Neutrophils %  74.8 % (40.0-70.0)  H  05/22/21  06:38    


 


Seg Neutrophils #  6.7 K/mm3 (1.8-7.7)   05/22/21  06:38    


 


APTT  31.1 Sec. (24.2-36.6)   05/21/21  17:55    


 


D-Dimer  373.6 ng/mlDDU (0-234)  H  05/21/21  Unknown


 


Sodium  137 mmol/L (137-145)   05/22/21  06:38    


 


Potassium  3.6 mmol/L (3.6-5.0)   05/22/21  06:38    


 


Chloride  101.8 mmol/L ()   05/22/21  06:38    


 


Carbon Dioxide  26 mmol/L (22-30)   05/22/21  06:38    


 


Anion Gap  13 mmol/L  05/22/21  06:38    


 


BUN  6 mg/dL (7-17)  L  05/22/21  06:38    


 


Creatinine  0.5 mg/dL (0.6-1.2)  L  05/22/21  06:38    


 


Estimated GFR  > 60 ml/min  05/22/21  06:38    


 


BUN/Creatinine Ratio  12 %  05/22/21  06:38    


 


Glucose  141 mg/dL ()  H  05/22/21  06:38    


 


Hemoglobin A1c  5.5 % (4-6)   05/22/21  06:38    


 


Lactic Acid  0.70 mmol/L (0.7-2.0)   05/21/21  20:36    


 


Calcium  8.5 mg/dL (8.4-10.2)   05/22/21  06:38    


 


Ferritin  382.5 ng/mL (10.0-200.0)  H  05/21/21  Unknown


 


Total Bilirubin  0.70 mg/dL (0.1-1.2)   05/22/21  06:38    


 


AST  18 units/L (5-40)   05/22/21  06:38    


 


ALT  14 units/L (7-56)   05/22/21  06:38    


 


Alkaline Phosphatase  76 units/L ()   05/22/21  06:38    


 


Lactate Dehydrogenase  180 units/L ()   05/21/21  Unknown


 


Troponin T  < 0.010 ng/mL (0.00-0.029)   05/21/21  Unknown


 


C-Reactive Protein  0.00 mg/dL (0.00-1.30)   05/21/21  Unknown


 


NT-Pro-B Natriuret Pep  106.8 pg/mL (0-900)   05/21/21  Unknown


 


Total Protein  7.1 g/dL (6.3-8.2)   05/22/21  06:38    


 


Albumin  3.7 g/dL (3.9-5)  L  05/22/21  06:38    


 


Albumin/Globulin Ratio  1.1 %  05/22/21  06:38    











Microbiology: 


Microbiology





05/21/21 17:55   Peripheral/Venous   Blood Culture - Preliminary


                            Culture in Progress


05/21/21 17:55   Peripheral/Venous   Blood Culture - Preliminary


                            Culture in Progress








Gonsalez/IV: 


                                        





Voiding Method                   Incontinent











Active Medications





- Current Medications


Current Medications: 














Generic Name Dose Route Start Last Admin





  Trade Name Freq  PRN Reason Stop Dose Admin


 


Acetaminophen  650 mg  05/21/21 22:19 





  Acetaminophen 325 Mg Tab  PO  





  Q4H PRN  





  Pain MILD(1-3)/Fever >100.5/HA  


 


Acetaminophen  325 mg  05/22/21 10:30  05/22/21 11:50





  Acetaminophen 325 Mg Rect Supp  VT   325 mg





  Q6H PRN   Administration





  Fever >101  


 


Amlodipine Besylate  5 mg  05/22/21 10:00  05/22/21 10:11





  Amlodipine 5 Mg Tab  PO   Not Given





  QDAY [facility]  


 


Aspirin  81 mg  05/22/21 10:00  05/22/21 10:12





  Aspirin 81 Mg Tab Chew  PO   Not Given





  QDAY [facility]  


 


Atorvastatin Calcium  80 mg  05/22/21 10:00  05/22/21 10:12





  Atorvastatin 40 Mg Tab  PO   Not Given





  DAILY [facility]  


 


Famotidine  20 mg  05/21/21 23:00  05/22/21 09:46





  Famotidine 20 Mg/2 Ml Inj  IV   20 mg





  BID TOMMY   Administration


 


Hydralazine HCl  50 mg  05/21/21 23:00  05/22/21 06:24





  Hydralazine 25 Mg Tab  PO   Not Given





  Q8HR [facility]  


 


Hydralazine HCl  10 mg  05/22/21 10:30 





  Hydralazine 20 Mg/1 Ml Inj  IV  





  Q4HR PRN  





  Blood Pressure  


 


Vancomycin HCl 1,250 mg/  275 mls @ 137.5 mls/hr  05/22/21 18:00 





  Sodium Chloride  IV  





  Q24H TOMMY  


 


Dextrose/Sodium Chloride  1,000 mls @ 125 mls/hr  05/21/21 23:00  05/22/21 11:51





  D5ns  IV   125 mls/hr





  AS DIRECT TOMMY   Administration


 


Cefepime HCl  2 gm in 100 mls @ 200 mls/hr  05/22/21 06:00  05/22/21 06:23





  Cefepime/Ns 2 Gm/100 Ml  IV   200 mls/hr





  Q12H TOMMY   Administration





  Protocol  


 


Metoclopramide HCl  10 mg  05/21/21 22:19 





  Metoclopramide 10 Mg/2 Ml Inj  IV  





  Q6H PRN  





  Nausea And Vomiting  


 


Morphine Sulfate  2 mg  05/21/21 22:19 





  Morphine 2 Mg/1 Ml Inj  IV  





  Q4H PRN  





  Pain, Moderate (4-6)  


 


Ondansetron HCl  4 mg  05/21/21 22:19 





  Ondansetron 4 Mg/2 Ml Inj  IV  





  Q8H PRN  





  Nausea And Vomiting  


 


Sodium Chloride  10 ml  05/22/21 10:00  05/22/21 09:47





  Sodium Chloride 0.9% 10 Ml Flush Syringe  IV   10 ml





  BID TOMMY   Administration


 


Sodium Chloride  10 ml  05/21/21 22:19 





  Sodium Chloride 0.9% 10 Ml Flush Syringe  IV  





  PRN PRN  





  LINE FLUSH  














Nutrition/Malnutrition Assess





- Dietary Evaluation


Nutrition/Malnutrition Findings: 


                                        





Nutrition Notes                                            Start:  05/22/21 

10:14


Freq:                                                      Status: Active       




Protocol:                                                                       




 Document     05/22/21 10:14  LP  (Rec: 05/22/21 10:19  LP  [id number])


 Nutrition Notes


     Need for Assessment generated from:         RN Referral


     Initial or Follow up                        Assessment


     Current Diagnosis                           COPD,Sepsis,Hypertension,


                                                 Respiratory Failure,Stroke


     Other Pertinent Diagnosis                   Dementia, pneu


     Current Diet                                Cardiac


     Labs/Tests                                  Reviewed


     Pertinent Medications                       D5NS at 125ml/hr


     Height                                      5 ft 5 in


     Weight                                      72 kg


     Ideal Body Weight (kg)                      56.81


     BMI                                         26.4


     Weight Status                               Overweight


     Subjective/Other Information                Screen for skin risk (12). No


                                                 wounds noted. Pt with dementia


                                                 .


     Burn                                        Absent


     Trauma                                      Absent


     GI Symptoms                                 None


     Minimum of two criteria                     No physical signs of


                                                 malnutrition


     #1


      Nutrition Diagnosis                        Predicted suboptimal energy


                                                 intake


      Etiology                                   Advance age/dementia


      As Evidenced by Signs and Symptoms         Unable to gather nutrition hx


     Is patient on ventilator?                   No


     Is Patient Ambulatory and/or Out of Bed     No


     REE-([facility]-confined to bed)      [phone]


     Calculation Used for Recommendations        [facility]


     Additional Notes                            Protein needs are 72-86g (1-1.


                                                 2g/kg)


                                                 Fluid needs are 1ml/kcal


 Nutrition Intervention


     Change Diet Order:                          Continue cardiac


     Add Supplement/Snack (indicate name/kcal    Ensure Enlive BID


      /protein )                                 


     Provides kCal:                              700


     Provides Protein (gm)                       40


     Goal #1                                     Meet at least 80% of kcal and


                                                 protein needs


     Anticipated Discharge Needs:                Cardiac diet and ONS as needed


     Follow-Up By:                               05/24/21


     Additional Comments                         Follow for intakes and ONS


                                                 tolerance

## 2021-05-23 LAB
BASOPHILS # (AUTO): 0.1 K/MM3 (ref 0–0.1)
BASOPHILS NFR BLD AUTO: 0.6 % (ref 0–1.8)
BUN SERPL-MCNC: 6 MG/DL (ref 7–17)
BUN/CREAT SERPL: 12 %
CALCIUM SERPL-MCNC: 8.5 MG/DL (ref 8.4–10.2)
EOSINOPHIL # BLD AUTO: 0 K/MM3 (ref 0–0.4)
EOSINOPHIL NFR BLD AUTO: 0.2 % (ref 0–4.3)
HCT VFR BLD CALC: 38.1 % (ref 30.3–42.9)
HEMOLYSIS INDEX: 27
HGB BLD-MCNC: 13 GM/DL (ref 10.1–14.3)
LYMPHOCYTES # BLD AUTO: 1.9 K/MM3 (ref 1.2–5.4)
LYMPHOCYTES NFR BLD AUTO: 18.9 % (ref 13.4–35)
MCHC RBC AUTO-ENTMCNC: 34 % (ref 30–34)
MCV RBC AUTO: 90 FL (ref 79–97)
MONOCYTES # (AUTO): 0.9 K/MM3 (ref 0–0.8)
MONOCYTES % (AUTO): 9.1 % (ref 0–7.3)
PLATELET # BLD: 226 K/MM3 (ref 140–440)
RBC # BLD AUTO: 4.24 M/MM3 (ref 3.65–5.03)

## 2021-05-23 RX ADMIN — CEFEPIME SCH MLS/HR: 2 INJECTION, POWDER, FOR SOLUTION INTRAVENOUS at 17:10

## 2021-05-23 RX ADMIN — Medication SCH ML: at 10:20

## 2021-05-23 RX ADMIN — ASPIRIN SCH: 81 TABLET, CHEWABLE ORAL at 10:18

## 2021-05-23 RX ADMIN — CEFEPIME SCH MLS/HR: 2 INJECTION, POWDER, FOR SOLUTION INTRAVENOUS at 05:57

## 2021-05-23 RX ADMIN — FAMOTIDINE SCH MG: 10 INJECTION, SOLUTION INTRAVENOUS at 10:20

## 2021-05-23 RX ADMIN — Medication SCH ML: at 22:46

## 2021-05-23 RX ADMIN — FAMOTIDINE SCH MG: 10 INJECTION, SOLUTION INTRAVENOUS at 23:02

## 2021-05-23 RX ADMIN — ACETAMINOPHEN PRN MG: 325 SUPPOSITORY RECTAL at 17:09

## 2021-05-23 RX ADMIN — DEXTROSE AND SODIUM CHLORIDE SCH MLS/HR: 5; .9 INJECTION, SOLUTION INTRAVENOUS at 13:12

## 2021-05-23 RX ADMIN — VANCOMYCIN HYDROCHLORIDE SCH MLS/HR: 5 INJECTION, POWDER, LYOPHILIZED, FOR SOLUTION INTRAVENOUS at 18:19

## 2021-05-23 NOTE — PROGRESS NOTE
Assessment and Plan


Assessment and plan: 








70-year-old -American female with a past medical history as below who 

presented with acute encephalopathy and sudden aphasia.  Patient is being 

treated for acute community-acquired pneumonia and work-up for altered mental 

status at this time.





Assessment and plan


Advance Directives: Yes (Full code)


VTE prophylaxis?: Chemical


Plan of care discussed with patient/family: Yes





- Patient Problems


Acute metabolic encephalopathy


Current Visit: No   Status: Acute   


Plan to address problem: 


Secondary to respiratory failure hypoxia and pneumonia


IV fluids and IV antibiotics


Head CT without any acute abnormalities


We will obtain MRI of the brain to rule out stroke








Acute respiratory failure with hypoxia


Current Visit: Yes   Status: Acute   


Plan to address problem: 


Currently on 3 L of nasal cannula oxygen


Continue oxygen supplementation








Pneumonia, community-acquired


Current Visit: Yes   Status: Acute   


Plan to address problem: 


IV ceftriaxone and IV azithromycin for now


Covid infection negative.








Sepsis secondary to community-acquired pneumonia acquired pneumonia


Current Visit: Yes   Status: Acute   


Plan to address problem: 


IV Rocephin and IV Zithromax





Acute dysphagia


Decreased p.o. intake, patient on fluids


Speech therapy consulted


MRI to rule out acute CVA


If this continues, will need to speak with the patient's family pertaining to 

PEG tube placement depending upon speech therapy recommendations





CVA, old, cognitive deficits


Current Visit: Yes   Status: Chronic   


Plan to address problem: 


Supportive care


Repeat CT of the head without any abnormalities of acute CVA


We will obtain MRI to rule out acute CVA








Hypertension


Current Visit: Yes   Status: Chronic   


Qualifiers: 


   Hypertension type: essential hypertension   Qualified Code(s): I10 - 

Essential (primary) hypertension   


Plan to address problem: 


Continue amlodipine


IV hydralazine with parameters








Hyperlipidemia


Current Visit: Yes   Status: Chronic   


Qualifiers: 


   Hyperlipidemia type: mixed hyperlipidemia   Qualified Code(s): E78.2 - Mixed 

hyperlipidemia   


Plan to address problem: 


Continue statins








DVT prophylaxis


Current Visit: Yes   Status: Acute   


Plan to address problem: 


On heparin and GI prophylaxis





CODE STATUS: Full





Disposition: Continue treatment for commune acquired pneumonia with IV 

antibiotics.  Patient continues to be aphasic, decreased p.o. intake, will 

obtain MRI to rule out acute CVA.  Unfortunately this may be the patient's new 

baseline and we will have to speak with the family pertaining to nutrition if 

the patient does not eat.





History


Interval history: 





5/22/2021: Patient seen and examined, patient is nonverbal, looking around the 

room, no distress.  Spoke with the nurse today, patient is not taking any oral 

medication or eating.  Patient febrile overnight.


5/22/2021: Patient seen this morning, continues to be nonverbal, no overnight 

events, patient afebrile.  Covid is negative.  Updated the family yesterday 

pertaining to the patient's condition.





Hospitalist Physical





- Physical exam


Narrative exam: 








General appearance: no acute distress, well-nourished


EENT: PERRL, EOM intact, hearing intact, dry oral mucosa


Respiratory: bilateral CTA, negative: rales, rhonchi, wheezing


Cardiovascular: Regular rate/rhythm, Normal S1 & S2. No gallop, rub


Extremities: no ischemia, No edema, normal temperature, normal color


Abdominal: soft, no tenderness, non-distended, normal bowel sounds


Integumentary: Present: clear, warm, dry no wounds, no erythema noted


Psychiatric: Flat affect, no response to verbal questions or cues.


Neurologic: CNII-XII intact, aphasic, moves all extremities











- Constitutional


Vitals: 


                                        











Temp Pulse Resp BP Pulse Ox


 


 99.4 F   83   16   136/78   92 


 


 05/23/21 04:05  05/23/21 04:05  05/23/21 04:05  05/23/21 04:05  05/23/21 04:05











General appearance: Present: no acute distress, well-nourished





HEART Score





- HEART Score


Troponin: 


                                        











Troponin T  < 0.010 ng/mL (0.00-0.029)   05/21/21  Unknown














Results





- Labs


CBC & Chem 7: 


                                 05/23/21 05:11





                                 05/23/21 05:11


Labs: 


                             Laboratory Last Values











WBC  10.0 K/mm3 (4.5-11.0)   05/23/21  05:11    


 


RBC  4.24 M/mm3 (3.65-5.03)   05/23/21  05:11    


 


Hgb  13.0 gm/dl (10.1-14.3)   05/23/21  05:11    


 


Hct  38.1 % (30.3-42.9)   05/23/21  05:11    


 


MCV  90 fl (79-97)   05/23/21  05:11    


 


MCH  31 pg (28-32)   05/23/21  05:11    


 


MCHC  34 % (30-34)   05/23/21  05:11    


 


RDW  14.1 % (13.2-15.2)   05/23/21  05:11    


 


Plt Count  226 K/mm3 (140-440)   05/23/21  05:11    


 


Lymph % (Auto)  18.9 % (13.4-35.0)   05/23/21  05:11    


 


Mono % (Auto)  9.1 % (0.0-7.3)  H  05/23/21  05:11    


 


Eos % (Auto)  0.2 % (0.0-4.3)   05/23/21  05:11    


 


Baso % (Auto)  0.6 % (0.0-1.8)   05/23/21  05:11    


 


Lymph # (Auto)  1.9 K/mm3 (1.2-5.4)   05/23/21  05:11    


 


Mono # (Auto)  0.9 K/mm3 (0.0-0.8)  H  05/23/21  05:11    


 


Eos # (Auto)  0.0 K/mm3 (0.0-0.4)   05/23/21  05:11    


 


Baso # (Auto)  0.1 K/mm3 (0.0-0.1)   05/23/21  05:11    


 


Seg Neutrophils %  71.2 % (40.0-70.0)  H  05/23/21  05:11    


 


Seg Neutrophils #  7.2 K/mm3 (1.8-7.7)   05/23/21  05:11    


 


APTT  31.1 Sec. (24.2-36.6)   05/21/21  17:55    


 


D-Dimer  373.6 ng/mlDDU (0-234)  H  05/21/21  Unknown


 


Sodium  136 mmol/L (137-145)  L  05/23/21  05:11    


 


Potassium  3.6 mmol/L (3.6-5.0)   05/23/21  05:11    


 


Chloride  99.9 mmol/L ()   05/23/21  05:11    


 


Carbon Dioxide  27 mmol/L (22-30)   05/23/21  05:11    


 


Anion Gap  13 mmol/L  05/23/21  05:11    


 


BUN  6 mg/dL (7-17)  L  05/23/21  05:11    


 


Creatinine  0.5 mg/dL (0.6-1.2)  L  05/23/21  05:11    


 


Estimated GFR  > 60 ml/min  05/23/21  05:11    


 


BUN/Creatinine Ratio  12 %  05/23/21  05:11    


 


Glucose  135 mg/dL ()  H  05/23/21  05:11    


 


POC Glucose  115 mg/dL ()  H  05/22/21  21:04    


 


Hemoglobin A1c  5.5 % (4-6)   05/22/21  06:38    


 


Lactic Acid  0.70 mmol/L (0.7-2.0)   05/21/21  20:36    


 


Calcium  8.5 mg/dL (8.4-10.2)   05/23/21  05:11    


 


Ferritin  382.5 ng/mL (10.0-200.0)  H  05/21/21  Unknown


 


Total Bilirubin  0.70 mg/dL (0.1-1.2)   05/22/21  06:38    


 


AST  18 units/L (5-40)   05/22/21  06:38    


 


ALT  14 units/L (7-56)   05/22/21  06:38    


 


Alkaline Phosphatase  76 units/L ()   05/22/21  06:38    


 


Lactate Dehydrogenase  180 units/L ()   05/21/21  Unknown


 


Troponin T  < 0.010 ng/mL (0.00-0.029)   05/21/21  Unknown


 


C-Reactive Protein  0.00 mg/dL (0.00-1.30)   05/21/21  Unknown


 


NT-Pro-B Natriuret Pep  106.8 pg/mL (0-900)   05/21/21  Unknown


 


Total Protein  7.1 g/dL (6.3-8.2)   05/22/21  06:38    


 


Albumin  3.7 g/dL (3.9-5)  L  05/22/21  06:38    


 


Albumin/Globulin Ratio  1.1 %  05/22/21  06:38    


 


Procalcitonin  < 0.05 ng/mL (<0.15)   05/21/21  Unknown


 


Urine Color  Yellow  (Yellow)   05/22/21  Unknown


 


Urine Turbidity  Clear  (Clear)   05/22/21  Unknown


 


Urine pH  6.0  (5.0-7.0)   05/22/21  Unknown


 


Ur Specific Gravity  1.014  (1.003-1.030)   05/22/21  Unknown


 


Urine Protein  <15 mg/dl mg/dL (Negative)   05/22/21  Unknown


 


Urine Glucose (UA)  Neg mg/dL (Negative)   05/22/21  Unknown


 


Urine Ketones  Neg mg/dL (Negative)   05/22/21  Unknown


 


Urine Blood  Neg  (Negative)   05/22/21  Unknown


 


Urine Nitrite  Neg  (Negative)   05/22/21  Unknown


 


Urine Bilirubin  Neg  (Negative)   05/22/21  Unknown


 


Urine Urobilinogen  < 2.0 mg/dL (<2.0)   05/22/21  Unknown


 


Ur Leukocyte Esterase  Neg  (Negative)   05/22/21  Unknown


 


Urine WBC (Auto)  1.0 /HPF (0.0-6.0)   05/22/21  Unknown


 


Urine RBC (Auto)  < 1.0 /HPF (0.0-6.0)   05/22/21  Unknown


 


U Epithel Cells (Auto)  1.0 /HPF (0-13.0)   05/22/21  Unknown


 


Urine Mucus  Few /HPF  05/22/21  Unknown


 


Coronavirus (PCR)  Negative  (Negative)   05/22/21  Unknown











Microbiology: 


Microbiology





05/21/21 17:55   Peripheral/Venous   Blood Culture - Preliminary


                            NO GROWTH AFTER 24 HOURS


05/21/21 17:55   Peripheral/Venous   Blood Culture - Preliminary


                            NO GROWTH AFTER 24 HOURS








Gonsalez/IV: 


                                        





Voiding Method                   External Female Catheter











Active Medications





- Current Medications


Current Medications: 














Generic Name Dose Route Start Last Admin





  Trade Name Freq  PRN Reason Stop Dose Admin


 


Acetaminophen  650 mg  05/21/21 22:19 





  Acetaminophen 325 Mg Tab  PO  





  Q4H PRN  





  Pain MILD(1-3)/Fever >100.5/HA  


 


Acetaminophen  325 mg  05/22/21 10:30  05/22/21 18:03





  Acetaminophen 325 Mg Rect Supp  OH   325 mg





  Q6H PRN   Administration





  Fever >101  


 


Amlodipine Besylate  5 mg  05/22/21 10:00  05/22/21 10:11





  Amlodipine 5 Mg Tab  PO   Not Given





  QDAY TOMMY  


 


Aspirin  81 mg  05/22/21 10:00  05/22/21 10:12





  Aspirin 81 Mg Tab Chew  PO   Not Given





  QDAY [facility]  


 


Atorvastatin Calcium  80 mg  05/22/21 10:00  05/22/21 10:12





  Atorvastatin 40 Mg Tab  PO   Not Given





  DAILY TOMMY  


 


Famotidine  20 mg  05/21/21 23:00  05/22/21 22:34





  Famotidine 20 Mg/2 Ml Inj  IV   20 mg





  BID TOMMY   Administration


 


Hydralazine HCl  50 mg  05/21/21 23:00  05/23/21 06:17





  Hydralazine 25 Mg Tab  PO   Not Given





  Q8HR TOMMY  


 


Hydralazine HCl  10 mg  05/22/21 10:30 





  Hydralazine 20 Mg/1 Ml Inj  IV  





  Q4HR PRN  





  Blood Pressure  


 


Vancomycin HCl 1,250 mg/  275 mls @ 137.5 mls/hr  05/22/21 18:00  05/22/21 17:50





  Sodium Chloride  IV   137.5 mls/hr





  Q24H TOMMY   Administration


 


Dextrose/Sodium Chloride  1,000 mls @ 125 mls/hr  05/21/21 23:00  05/22/21 22:36





  D5ns  IV   125 mls/hr





  AS DIRECT TOMMY   Administration


 


Cefepime HCl  2 gm in 100 mls @ 200 mls/hr  05/22/21 06:00  05/23/21 05:57





  Cefepime/Ns 2 Gm/100 Ml  IV   200 mls/hr





  Q12H TOMMY   Administration





  Protocol  


 


Metoclopramide HCl  10 mg  05/21/21 22:19 





  Metoclopramide 10 Mg/2 Ml Inj  IV  





  Q6H PRN  





  Nausea And Vomiting  


 


Morphine Sulfate  2 mg  05/21/21 22:19 





  Morphine 2 Mg/1 Ml Inj  IV  





  Q4H PRN  





  Pain, Moderate (4-6)  


 


Ondansetron HCl  4 mg  05/21/21 22:19 





  Ondansetron 4 Mg/2 Ml Inj  IV  





  Q8H PRN  





  Nausea And Vomiting  


 


Sodium Chloride  10 ml  05/22/21 10:00  05/22/21 22:34





  Sodium Chloride 0.9% 10 Ml Flush Syringe  IV   10 ml





  BID TOMMY   Administration


 


Sodium Chloride  10 ml  05/21/21 22:19 





  Sodium Chloride 0.9% 10 Ml Flush Syringe  IV  





  PRN PRN  





  LINE FLUSH  














Nutrition/Malnutrition Assess





- Dietary Evaluation


Nutrition/Malnutrition Findings: 


                                        





Nutrition Notes                                            Start:  05/22/21 

10:14


Freq:                                                      Status: Active       




Protocol:                                                                       




 Document     05/22/21 10:14  LP  (Rec: 05/22/21 10:19  LP  [id number])


 Nutrition Notes


     Need for Assessment generated from:         RN Referral


     Initial or Follow up                        Assessment


     Current Diagnosis                           COPD,Sepsis,Hypertension,


                                                 Respiratory Failure,Stroke


     Other Pertinent Diagnosis                   Dementia, pneu


     Current Diet                                Cardiac


     Labs/Tests                                  Reviewed


     Pertinent Medications                       D5NS at 125ml/hr


     Height                                      5 ft 5 in


     Weight                                      72 kg


     Ideal Body Weight (kg)                      56.81


     BMI                                         26.4


     Weight Status                               Overweight


     Subjective/Other Information                Screen for skin risk (12). No


                                                 wounds noted. Pt with dementia


                                                 .


     Burn                                        Absent


     Trauma                                      Absent


     GI Symptoms                                 None


     Minimum of two criteria                     No physical signs of


                                                 malnutrition


     #1


      Nutrition Diagnosis                        Predicted suboptimal energy


                                                 intake


      Etiology                                   Advance age/dementia


      As Evidenced by Signs and Symptoms         Unable to gather nutrition hx


     Is patient on ventilator?                   No


     Is Patient Ambulatory and/or Out of Bed     No


     REE-([facility]-confined to bed)      [phone]


     Calculation Used for Recommendations        [facility]


     Additional Notes                            Protein needs are 72-86g (1-1.


                                                 2g/kg)


                                                 Fluid needs are 1ml/kcal


 Nutrition Intervention


     Change Diet Order:                          Continue cardiac


     Add Supplement/Snack (indicate name/kcal    Ensure Enlive BID


      /protein )                                 


     Provides kCal:                              700


     Provides Protein (gm)                       40


     Goal #1                                     Meet at least 80% of kcal and


                                                 protein needs


     Anticipated Discharge Needs:                Cardiac diet and ONS as needed


     Follow-Up By:                               05/24/21


     Additional Comments                         Follow for intakes and ONS


                                                 tolerance

## 2021-05-24 LAB
BUN SERPL-MCNC: 5 MG/DL (ref 7–17)
BUN/CREAT SERPL: 10 %
CALCIUM SERPL-MCNC: 8.3 MG/DL (ref 8.4–10.2)
HCT VFR BLD CALC: 36.1 % (ref 30.3–42.9)
HEMOLYSIS INDEX: 9
HGB BLD-MCNC: 12.3 GM/DL (ref 10.1–14.3)
MCHC RBC AUTO-ENTMCNC: 34 % (ref 30–34)
MCV RBC AUTO: 91 FL (ref 79–97)
PLATELET # BLD: 199 K/MM3 (ref 140–440)
RBC # BLD AUTO: 3.98 M/MM3 (ref 3.65–5.03)

## 2021-05-24 RX ADMIN — Medication SCH ML: at 10:35

## 2021-05-24 RX ADMIN — DEXTROSE AND SODIUM CHLORIDE SCH MLS/HR: 5; .9 INJECTION, SOLUTION INTRAVENOUS at 10:28

## 2021-05-24 RX ADMIN — FAMOTIDINE SCH MG: 10 INJECTION, SOLUTION INTRAVENOUS at 10:28

## 2021-05-24 RX ADMIN — DEXTROSE AND SODIUM CHLORIDE SCH MLS/HR: 5; .9 INJECTION, SOLUTION INTRAVENOUS at 00:20

## 2021-05-24 RX ADMIN — CEFEPIME SCH MLS/HR: 2 INJECTION, POWDER, FOR SOLUTION INTRAVENOUS at 17:55

## 2021-05-24 RX ADMIN — NYSTATIN SCH: 100000 SUSPENSION ORAL at 18:03

## 2021-05-24 RX ADMIN — VANCOMYCIN HYDROCHLORIDE SCH MLS/HR: 5 INJECTION, POWDER, LYOPHILIZED, FOR SOLUTION INTRAVENOUS at 18:03

## 2021-05-24 RX ADMIN — ASPIRIN SCH: 81 TABLET, CHEWABLE ORAL at 10:35

## 2021-05-24 RX ADMIN — Medication SCH ML: at 22:05

## 2021-05-24 RX ADMIN — CEFEPIME SCH MLS/HR: 2 INJECTION, POWDER, FOR SOLUTION INTRAVENOUS at 05:18

## 2021-05-24 RX ADMIN — NYSTATIN SCH: 100000 SUSPENSION ORAL at 22:02

## 2021-05-24 RX ADMIN — FAMOTIDINE SCH MG: 10 INJECTION, SOLUTION INTRAVENOUS at 22:03

## 2021-05-24 NOTE — CAT SCAN REPORT
CT angio neck



INDICATION / CLINICAL INFORMATION:

70 years Female; MAIN. 



TECHNIQUE: Thin cut axial images obtained through the head during IV bolus contrast administration. S
agittal, coronal, and 3 plane MIP reconstructions performed by the technologist. NASCET type criteria
 used evaluate stenoses. All CT scans at this location are performed using CT dose reduction for ALAR
A by means of automated exposure control. 



COMPARISON:

The study is compared to the previous CTA of 8/1/2019.



FINDINGS: 



CAROTID ARTERIES: The motion and beam hardening degrade the image quality, particularly involving pro
ximal vessels given the overlying bony structures and disc venous contrast. However, there is mild at
herosclerotic calcification involving carotid bifurcations without significant stenosis of the intern
al carotid arteries by NASCET criteria bilaterally.



VERTEBRAL ARTERIES: There is again suggestion of moderate narrowing of the origin of the left vertebr
al artery which was reported on the prior study at. There is no significant developing narrowing of t
he right vertebral artery. 



ARCH: There is atherosclerotic calcification involving aortic arch. However, there is no clear eviden
ce of significant developing stenosis of the arch vessels.



ADDITIONAL FINDINGS: Remainder of the surrounding soft tissues are grossly normal. 



IMPRESSION:



There is mild calcification involving carotid bifurcations bilaterally without significant stenosis b
y NASCET criteria.



There is moderate narrowing of the origin of the left vertebral artery which was also noted on the re
port to the previous CTA of 8/1/2019.



Signer Name: Siddhartha Reyes MD 

Signed: 5/24/2021 2:01 PM

Workstation Name: [id number]

## 2021-05-24 NOTE — XRAY REPORT
CHEST 2 VIEWS 



INDICATION / CLINICAL INFORMATION:

pneumonia.



COMPARISON: 

5/21/2021



FINDINGS:



SUPPORT DEVICES: None.

HEART / MEDIASTINUM: Cardiomegaly, prominent right hilum 

LUNGS / PLEURA: Slight increase in interstitial markings No pneumothorax. 



ADDITIONAL FINDINGS: No significant additional findings.



IMPRESSION:

Cardiomegaly with mild interstitial prominence that has developed since 5/21/2021. The right hilum is
 prominent in appearance.



Signer Name: Rishi Haile MD FACR 

Signed: 5/24/2021 8:39 AM

Workstation Name: [id number]

## 2021-05-24 NOTE — CAT SCAN REPORT
CT angio head



INDICATION / CLINICAL INFORMATION:

70 years Female; MAIN. 



TECHNIQUE: Thin cut axial images obtained through the head during IV bolus contrast administration. S
agittal, coronal, and 3 plane MIP reconstructions performed by the technologist. NASCET type criteria
 used evaluate stenoses. Automated exposure control utilized for radiation reduction purposes. 



COMPARISON: 

The previous exam of 8/1/2019 is not currently available for direct comparison.



FINDINGS:



INTERNAL CAROTID ARTERIES: There is mild calcification involving distal internal carotid arteries wit
hout significant focal stenosis by NASCET criteria. Similar findings were reported on the previous CT
A



VERTEBROBASILAR SYSTEM: There is no significant focal stenosis involving the vertebral basilar system
. 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



CEREBRAL ARTERIES: There is no significant focal stenosis involving proximal arteries or visualized a
djacent segments at. There is no clear CT evidence of large vessel occlusion.



ANEURYSM: None identified.



ADDITIONAL FINDINGS: There is extensive cerebral white matter disease most consistent with microvascu
lar angiopathy at. The findings correlate with the earlier CT head of 5/22/2021. 



IMPRESSION:

There is no CT evidence of large vessel occlusion involving intracranial vessels.



Signer Name: Siddhartha Reyes MD 

Signed: 5/24/2021 2:14 PM

Workstation Name: [id number]

## 2021-05-24 NOTE — EVENT NOTE
Date: 05/24/21





Tried to see the patient multiple times, remains off the floor.  Continue 

ceftriaxone, azithromycin for presumed pneumonia. Will follow up tomorrow.

## 2021-05-24 NOTE — PROGRESS NOTE
Assessment and Plan


Assessment and plan: 








70-year-old -American female with a past medical history as below who 

presented with acute encephalopathy and sudden aphasia.  Patient is being 

treated for acute community-acquired pneumonia and work-up for altered mental 

status at this time.





Assessment and plan


Advance Directives: Yes (Full code)


VTE prophylaxis?: Chemical


Plan of care discussed with patient/family: Yes





- Patient Problems


Acute metabolic encephalopathy


Current Visit: No   Status: Acute   


Plan to address problem: 


Secondary to respiratory failure hypoxia and pneumonia


IV fluids and IV antibiotics


Head CT without any acute abnormalities


We will obtain MRI of the brain to rule out stroke


Neurology has been consulted








Acute respiratory failure with hypoxia


Current Visit: Yes   Status: Acute   


Plan to address problem: 


Currently on 3 L of nasal cannula oxygen


Continue oxygen supplementation








Pneumonia, community-acquired


Current Visit: Yes   Status: Acute   


Plan to address problem: 


IV ceftriaxone and IV azithromycin for now


Covid infection negative.








Sepsis secondary to community-acquired pneumonia acquired pneumonia


Current Visit: Yes   Status: Acute   


Plan to address problem: 


IV Rocephin and IV Zithromax


Patient has been febrile multiple times, consult infectious disease





Acute dysphagia


Decreased p.o. intake, patient on fluids


Speech therapy consulted


MRI to rule out acute CVA


Family will come in to try to feed the patient today.





CVA, old, cognitive deficits


Current Visit: Yes   Status: Chronic   


Plan to address problem: 


Supportive care


Repeat CT of the head without any abnormalities of acute CVA


We will obtain MRI to rule out acute CVA


Neurology consulted for neurological








Hypertension


Current Visit: Yes   Status: Chronic   


Qualifiers: 


   Hypertension type: essential hypertension   Qualified Code(s): I10 - 

Essential (primary) hypertension   


Plan to address problem: 


Continue amlodipine


IV hydralazine with parameters








Hyperlipidemia


Current Visit: Yes   Status: Chronic   


Qualifiers: 


   Hyperlipidemia type: mixed hyperlipidemia   Qualified Code(s): E78.2 - Mixed 

hyperlipidemia   


Plan to address problem: 


Continue statins








DVT prophylaxis


Current Visit: Yes   Status: Acute   


Plan to address problem: 


On heparin and GI prophylaxis





CODE STATUS: Full





Disposition: Plan for MRI today, family will come in to try to feed the patient.

 Discussed NG tube, hold off for now.  Deficiencies, ID consulted due to 

multiple fevers while being treated for pneumonia.





History


Interval history: 





5/22/2021: Patient seen and examined, patient is nonverbal, looking around the 

room, no distress.  Spoke with the nurse today, patient is not taking any oral 

medication or eating.  Patient febrile overnight.


5/22/2021: Patient seen this morning, continues to be nonverbal, no overnight 

events, patient afebrile.  Covid is negative.  Updated the family yesterday 

pertaining to the patient's condition.


5/23/2021: Patient seen this morning, continues to be aphasic, not responsive to

verbal cues.  But in no acute distress.  Patient was febrile overnight 101 

fever.  Spoke with the family, they will come in and try to feed the patient 

since she is not taking in any p.o. intake, continues to be on fluids





Hospitalist Physical





- Physical exam


Narrative exam: 








General appearance: no acute distress, well-nourished


EENT: PERRL, EOM intact, hearing intact, dry oral mucosa


Respiratory: bilateral CTA, negative: rales, rhonchi, wheezing


Cardiovascular: Regular rate/rhythm, Normal S1 & S2. No gallop, rub


Extremities: no ischemia, No edema, normal temperature, normal color


Abdominal: soft, no tenderness, non-distended, normal bowel sounds


Integumentary: Present: clear, warm, dry no wounds, no erythema noted


Psychiatric: Flat affect, no response to verbal questions or cues.


Neurologic: CNII-XII intact, aphasic, moves all extremities











- Constitutional


Vitals: 


                                        











Temp Pulse Resp BP Pulse Ox


 


 97.6 F   72   16   127/81   96 


 


 05/24/21 10:31  05/24/21 10:31  05/24/21 10:31  05/24/21 10:31  05/24/21 10:31











General appearance: Present: no acute distress, well-nourished





HEART Score





- HEART Score


Troponin: 


                                        











Troponin T  < 0.010 ng/mL (0.00-0.029)   05/21/21  Unknown














Results





- Labs


CBC & Chem 7: 


                                 05/24/21 04:38





                                 05/24/21 04:38


Labs: 


                             Laboratory Last Values











WBC  9.7 K/mm3 (4.5-11.0)   05/24/21  04:38    


 


RBC  3.98 M/mm3 (3.65-5.03)   05/24/21  04:38    


 


Hgb  12.3 gm/dl (10.1-14.3)   05/24/21  04:38    


 


Hct  36.1 % (30.3-42.9)   05/24/21  04:38    


 


MCV  91 fl (79-97)   05/24/21  04:38    


 


MCH  31 pg (28-32)   05/24/21  04:38    


 


MCHC  34 % (30-34)   05/24/21  04:38    


 


RDW  13.9 % (13.2-15.2)   05/24/21  04:38    


 


Plt Count  199 K/mm3 (140-440)   05/24/21  04:38    


 


Lymph % (Auto)  18.9 % (13.4-35.0)   05/23/21  05:11    


 


Mono % (Auto)  9.1 % (0.0-7.3)  H  05/23/21  05:11    


 


Eos % (Auto)  0.2 % (0.0-4.3)   05/23/21  05:11    


 


Baso % (Auto)  0.6 % (0.0-1.8)   05/23/21  05:11    


 


Lymph # (Auto)  1.9 K/mm3 (1.2-5.4)   05/23/21  05:11    


 


Mono # (Auto)  0.9 K/mm3 (0.0-0.8)  H  05/23/21  05:11    


 


Eos # (Auto)  0.0 K/mm3 (0.0-0.4)   05/23/21  05:11    


 


Baso # (Auto)  0.1 K/mm3 (0.0-0.1)   05/23/21  05:11    


 


Seg Neutrophils %  71.2 % (40.0-70.0)  H  05/23/21  05:11    


 


Seg Neutrophils #  7.2 K/mm3 (1.8-7.7)   05/23/21  05:11    


 


APTT  31.1 Sec. (24.2-36.6)   05/21/21  17:55    


 


D-Dimer  373.6 ng/mlDDU (0-234)  H  05/21/21  Unknown


 


Sodium  140 mmol/L (137-145)   05/24/21  04:38    


 


Potassium  3.4 mmol/L (3.6-5.0)  L  05/24/21  04:38    


 


Chloride  104.0 mmol/L ()   05/24/21  04:38    


 


Carbon Dioxide  28 mmol/L (22-30)   05/24/21  04:38    


 


Anion Gap  11 mmol/L  05/24/21  04:38    


 


BUN  5 mg/dL (7-17)  L  05/24/21  04:38    


 


Creatinine  0.5 mg/dL (0.6-1.2)  L  05/24/21  04:38    


 


Estimated GFR  > 60 ml/min  05/24/21  04:38    


 


BUN/Creatinine Ratio  10 %  05/24/21  04:38    


 


Glucose  124 mg/dL ()  H  05/24/21  04:38    


 


POC Glucose  115 mg/dL ()  H  05/22/21  21:04    


 


Hemoglobin A1c  5.5 % (4-6)   05/22/21  06:38    


 


Lactic Acid  0.70 mmol/L (0.7-2.0)   05/21/21  20:36    


 


Calcium  8.3 mg/dL (8.4-10.2)  L  05/24/21  04:38    


 


Ferritin  382.5 ng/mL (10.0-200.0)  H  05/21/21  Unknown


 


Total Bilirubin  0.70 mg/dL (0.1-1.2)   05/22/21  06:38    


 


AST  18 units/L (5-40)   05/22/21  06:38    


 


ALT  14 units/L (7-56)   05/22/21  06:38    


 


Alkaline Phosphatase  76 units/L ()   05/22/21  06:38    


 


Lactate Dehydrogenase  180 units/L ()   05/21/21  Unknown


 


Troponin T  < 0.010 ng/mL (0.00-0.029)   05/21/21  Unknown


 


C-Reactive Protein  0.00 mg/dL (0.00-1.30)   05/21/21  Unknown


 


NT-Pro-B Natriuret Pep  106.8 pg/mL (0-900)   05/21/21  Unknown


 


Total Protein  7.1 g/dL (6.3-8.2)   05/22/21  06:38    


 


Albumin  3.7 g/dL (3.9-5)  L  05/22/21  06:38    


 


Albumin/Globulin Ratio  1.1 %  05/22/21  06:38    


 


Procalcitonin  < 0.05 ng/mL (<0.15)   05/21/21  Unknown


 


Urine Color  Yellow  (Yellow)   05/22/21  Unknown


 


Urine Turbidity  Clear  (Clear)   05/22/21  Unknown


 


Urine pH  6.0  (5.0-7.0)   05/22/21  Unknown


 


Ur Specific Gravity  1.014  (1.003-1.030)   05/22/21  Unknown


 


Urine Protein  <15 mg/dl mg/dL (Negative)   05/22/21  Unknown


 


Urine Glucose (UA)  Neg mg/dL (Negative)   05/22/21  Unknown


 


Urine Ketones  Neg mg/dL (Negative)   05/22/21  Unknown


 


Urine Blood  Neg  (Negative)   05/22/21  Unknown


 


Urine Nitrite  Neg  (Negative)   05/22/21  Unknown


 


Urine Bilirubin  Neg  (Negative)   05/22/21  Unknown


 


Urine Urobilinogen  < 2.0 mg/dL (<2.0)   05/22/21  Unknown


 


Ur Leukocyte Esterase  Neg  (Negative)   05/22/21  Unknown


 


Urine WBC (Auto)  1.0 /HPF (0.0-6.0)   05/22/21  Unknown


 


Urine RBC (Auto)  < 1.0 /HPF (0.0-6.0)   05/22/21  Unknown


 


U Epithel Cells (Auto)  1.0 /HPF (0-13.0)   05/22/21  Unknown


 


Urine Mucus  Few /HPF  05/22/21  Unknown


 


Coronavirus (PCR)  Negative  (Negative)   05/22/21  Unknown











Microbiology: 


Microbiology





05/22/21 Unknown   Urine,Clean Catch   Urine Culture - Preliminary


                                NO GROWTH AFTER 24 HOURS


05/21/21 17:55   Peripheral/Venous   Blood Culture - Preliminary


                            NO GROWTH AFTER 48 HOURS


05/21/21 17:55   Peripheral/Venous   Blood Culture - Preliminary


                            NO GROWTH AFTER 48 HOURS








Gonsalez/IV: 


                                        





Voiding Method                   External Female Catheter











Active Medications





- Current Medications


Current Medications: 














Generic Name Dose Route Start Last Admin





  Trade Name Freq  PRN Reason Stop Dose Admin


 


Acetaminophen  650 mg  05/21/21 22:19 





  Acetaminophen 325 Mg Tab  PO  





  Q4H PRN  





  Pain MILD(1-3)/Fever >100.5/HA  


 


Acetaminophen  325 mg  05/22/21 10:30  05/23/21 17:09





  Acetaminophen 325 Mg Rect Supp  VT   325 mg





  Q6H PRN   Administration





  Fever >101  


 


Amlodipine Besylate  5 mg  05/22/21 10:00  05/23/21 10:17





  Amlodipine 5 Mg Tab  PO   Not Given





  QDAY [facility]  


 


Aspirin  81 mg  05/22/21 10:00  05/23/21 10:18





  Aspirin 81 Mg Tab Chew  PO   Not Given





  QDAY TOMMY  


 


Atorvastatin Calcium  80 mg  05/22/21 10:00  05/23/21 10:18





  Atorvastatin 40 Mg Tab  PO   Not Given





  DAILY TOMMY  


 


Famotidine  20 mg  05/21/21 23:00  05/24/21 10:28





  Famotidine 20 Mg/2 Ml Inj  IV   20 mg





  BID TOMMY   Administration


 


Hydralazine HCl  50 mg  05/21/21 23:00  05/24/21 06:05





  Hydralazine 25 Mg Tab  PO   Not Given





  Q8HR TOMMY  


 


Hydralazine HCl  10 mg  05/22/21 10:30 





  Hydralazine 20 Mg/1 Ml Inj  IV  





  Q4HR PRN  





  Blood Pressure  


 


Vancomycin HCl 1,250 mg/  275 mls @ 137.5 mls/hr  05/22/21 18:00  05/23/21 18:19





  Sodium Chloride  IV   137.5 mls/hr





  Q24H TOMMY   Administration


 


Dextrose/Sodium Chloride  1,000 mls @ 125 mls/hr  05/21/21 23:00  05/24/21 10:28





  D5ns  IV   125 mls/hr





  AS DIRECT TOMMY   Administration


 


Cefepime HCl  2 gm in 100 mls @ 200 mls/hr  05/22/21 06:00  05/24/21 05:18





  Cefepime/Ns 2 Gm/100 Ml  IV   200 mls/hr





  Q12H TOMMY   Administration





  Protocol  


 


Metoclopramide HCl  10 mg  05/21/21 22:19 





  Metoclopramide 10 Mg/2 Ml Inj  IV  





  Q6H PRN  





  Nausea And Vomiting  


 


Morphine Sulfate  2 mg  05/21/21 22:19 





  Morphine 2 Mg/1 Ml Inj  IV  





  Q4H PRN  





  Pain, Moderate (4-6)  


 


Ondansetron HCl  4 mg  05/21/21 22:19 





  Ondansetron 4 Mg/2 Ml Inj  IV  





  Q8H PRN  





  Nausea And Vomiting  


 


Sodium Chloride  10 ml  05/22/21 10:00  05/23/21 22:46





  Sodium Chloride 0.9% 10 Ml Flush Syringe  IV   10 ml





  BID TOMMY   Administration


 


Sodium Chloride  10 ml  05/21/21 22:19 





  Sodium Chloride 0.9% 10 Ml Flush Syringe  IV  





  PRN PRN  





  LINE FLUSH  














Nutrition/Malnutrition Assess





- Dietary Evaluation


Nutrition/Malnutrition Findings: 


                                        





Nutrition Notes                                            Start:  05/22/21 

10:14


Freq:                                                      Status: Active       




Protocol:                                                                       




 Document     05/22/21 10:14  LP  (Rec: 05/22/21 10:19  LP  [id number])


 Nutrition Notes


     Need for Assessment generated from:         RN Referral


     Initial or Follow up                        Assessment


     Current Diagnosis                           COPD,Sepsis,Hypertension,


                                                 Respiratory Failure,Stroke


     Other Pertinent Diagnosis                   Dementia, pneu


     Current Diet                                Cardiac


     Labs/Tests                                  Reviewed


     Pertinent Medications                       D5NS at 125ml/hr


     Height                                      5 ft 5 in


     Weight                                      72 kg


     Ideal Body Weight (kg)                      56.81


     BMI                                         26.4


     Weight Status                               Overweight


     Subjective/Other Information                Screen for skin risk (12). No


                                                 wounds noted. Pt with dementia


                                                 .


     Burn                                        Absent


     Trauma                                      Absent


     GI Symptoms                                 None


     Minimum of two criteria                     No physical signs of


                                                 malnutrition


     #1


      Nutrition Diagnosis                        Predicted suboptimal energy


                                                 intake


      Etiology                                   Advance age/dementia


      As Evidenced by Signs and Symptoms         Unable to gather nutrition hx


     Is patient on ventilator?                   No


     Is Patient Ambulatory and/or Out of Bed     No


     REE-([facility]-confined to bed)      1483.104


     Calculation Used for Recommendations        [facility]


     Additional Notes                            Protein needs are 72-86g (1-1.


                                                 2g/kg)


                                                 Fluid needs are 1ml/kcal


 Nutrition Intervention


     Change Diet Order:                          Continue cardiac


     Add Supplement/Snack (indicate name/kcal    Ensure Enlive BID


      /protein )                                 


     Provides kCal:                              700


     Provides Protein (gm)                       40


     Goal #1                                     Meet at least 80% of kcal and


                                                 protein needs


     Anticipated Discharge Needs:                Cardiac diet and ONS as needed


     Follow-Up By:                               05/24/21


     Additional Comments                         Follow for intakes and ONS


                                                 tolerance

## 2021-05-24 NOTE — MAGNETIC RESONANCE REPORT
MR brain wo con



INDICATION / CLINICAL INFORMATION:

acute encephalopathy.



TECHNIQUE: 

Multiplanar, multisequence MR images of the brain were obtained.



COMPARISON: 

MRI brain August 3, 2019. CT head May 22, 2001 



FINDINGS:



INTRACRANIAL: No restricted diffusion. No hemorrhage. Ventricular caliber is normal. No extra-axial c
ollection. No mass.  No herniation. Major intracranial vascular flow voids are preserved. Severe conf
luent periventricular, centrum semiovale, and central lizeth T2 white matter hyperintensities most cons
istent with sequela of chronic microvascular disease. Remote bilateral lacunar infarctions in the bas
al ganglia, cerebellum, and centrum semiovale. There are scattered foci of hemosiderin deposition see
n within the supratentorial and infratentorial parenchyma with a peripheral predilection. This is mos
t likely related to hypertensive angiopathy given the other findings; however, amyloid angiopathy can
 have a similar appearance. 



ORBITS: No significant abnormality of visualized orbits.



SINUSES / MASTOIDS: Persistent bilateral mastoid effusions. No significant abnormality is seen within
 the visualized portions of the nasopharynx.



ADDITIONAL FINDINGS: None. 



IMPRESSION:

1. No acute abnormality. 



2. Severe sequela of chronic microvascular disease similar to the prior exam.

 



Signer Name: Zachary Baldwin MD 

Signed: 5/24/2021 3:06 PM

Workstation Name: [id number]

## 2021-05-24 NOTE — CONSULTATION
History of Present Illness


Consult date: 05/24/21


Reason for Consult: encephalopathy


History of present illness: 





Fever and altered sensorium since last night.





History of present illness: 


70-year-old female with past medical history of hemorrhagic CVA with residual 

right-sided weakness, dementia, COPD on 2 L nasal cannula oxygen brought in by 

family for altered mental status since last night.  Also patient has been having

fever.  Patient had a temperature 103.5.  Patient with altered sensorium.  

Patient received first dose of Covid vaccination 3 weeks ago but is due for the 

second dose 1 week later.  No one at home is having fever any infections.  

Patient has  is none verbal  and coughing and desaturating.  No exacerbating or 

precipitating factors.  The main problem is fever and desaturation and altered 

mental status.  Chest x-ray in the ER revealed right-sided pneumonia which is 

developing.


CT brain is unremarkable





- Past Medical History


-- Hypertension: Yes


--CVA: Yes (hemorragic cva 9/2015)


--COPD: Yes


--Additional medical history: bells palsy, dementia





 -Past surgical history


 not available





- Social History  


--Smoking Status: Unknown if ever smoked  





-Family History


Htn





- Medications


Home Medications: 


                                Home Medications











 Medication  Instructions  Recorded  Confirmed  Last Taken  Type


 


Aspirin [Aspirin BABY CHEW TAB] 81 mg PO QDAY #30 tab.chew 08/05/19 05/21/21 05/21/21 Rx


 


amLODIPine 5 mg PO QDAY #30 tablet 08/05/19 05/21/21 05/21/21 Rx


 


hydrALAZINE [Apresoline TAB] 50 mg PO Q8HR #90 tablet 08/05/19 05/21/21 05/21/21

Rx


 


AtorvaSTATin [Lipitor] 80 mg PO DAILY 05/21/21 05/21/21 05/21/21 History














Review of Systems


ROS: 


Constitutional altered sensorium


HEENT no sore throat no post nasal drip no diplopia


Neck no neck stiffness no lymph gland enlargement


Chest and lungs no shortness of breath cough or wheezing


CVS no chest pain no diaphoresis no palpitations


GI no nausea no vomiting no diarrhea


Genitourinary system no dysuria no flank pain


Musculoskeletal system no muscle pains no joint pains


CNS right-sided weakness


Skin no rash no itching


Psychiatric no depression no homicidal or suicidal tendencies


Hematologic no lymphedema or bruising


Endocrine no polydipsia no polyuria no cold intolerance no heat intolerance











Past History


Past Medical History: hypertension, stroke





Medications and Allergies


                                    Allergies











Allergy/AdvReac Type Severity Reaction Status Date / Time


 


No Known Allergies Allergy   Verified 09/18/15 07:34











                                Home Medications











 Medication  Instructions  Recorded  Confirmed  Last Taken  Type


 


Aspirin [Aspirin BABY CHEW TAB] 81 mg PO QDAY #30 tab.chew 08/05/19 05/21/21 05/21/21 Rx


 


amLODIPine 5 mg PO QDAY #30 tablet 08/05/19 05/21/21 05/21/21 Rx


 


hydrALAZINE [Apresoline TAB] 50 mg PO Q8HR #90 tablet 08/05/19 05/21/21 05/21/21

Rx


 


AtorvaSTATin [Lipitor] 80 mg PO DAILY 05/21/21 05/21/21 05/21/21 History











Active Meds: 


Active Medications





Acetaminophen (Acetaminophen 325 Mg Tab)  650 mg PO Q4H PRN


   PRN Reason: Pain MILD(1-3)/Fever >100.5/HA


Acetaminophen (Acetaminophen 325 Mg Rect Supp)  325 mg [place] Q6H PRN


   PRN Reason: Fever >101


   Last Admin: 05/23/21 17:09 Dose:  325 mg


   Documented by: 


Amlodipine Besylate (Amlodipine 5 Mg Tab)  5 mg PO QDAY [facility]


   Last Admin: 05/23/21 10:17 Dose:  Not Given


   Documented by: 


Aspirin (Aspirin 81 Mg Tab Chew)  81 mg PO QDAY [facility]


   Last Admin: 05/23/21 10:18 Dose:  Not Given


   Documented by: 


Atorvastatin Calcium (Atorvastatin 40 Mg Tab)  80 mg PO DAILY [facility]


   Last Admin: 05/23/21 10:18 Dose:  Not Given


   Documented by: 


Famotidine (Famotidine 20 Mg/2 Ml Inj)  20 mg IV BID [facility]


   Last Admin: 05/23/21 23:02 Dose:  20 mg


   Documented by: 


Hydralazine HCl (Hydralazine 25 Mg Tab)  50 mg PO Q8HR [facility]


   Last Admin: 05/24/21 06:05 Dose:  Not Given


   Documented by: 


Hydralazine HCl (Hydralazine 20 Mg/1 Ml Inj)  10 mg IV Q4HR PRN


   PRN Reason: Blood Pressure


Vancomycin HCl 1,250 mg/ (Sodium Chloride)  275 mls @ 137.5 mls/hr IV Q24H [facility]


   Last Admin: 05/23/21 18:19 Dose:  137.5 mls/hr


   Documented by: 


Dextrose/Sodium Chloride (D5ns)  1,000 mls @ 125 mls/hr IV AS DIRECT TOMMY


   Last Admin: 05/24/21 00:20 Dose:  125 mls/hr


   Documented by: 


Cefepime HCl (Cefepime/Ns 2 Gm/100 Ml)  2 gm in 100 mls @ 200 mls/hr IV Q12H 

TOMMY; Protocol


   Last Admin: 05/24/21 05:18 Dose:  200 mls/hr


   Documented by: 


Metoclopramide HCl (Metoclopramide 10 Mg/2 Ml Inj)  10 mg IV Q6H PRN


   PRN Reason: Nausea And Vomiting


Morphine Sulfate (Morphine 2 Mg/1 Ml Inj)  2 mg IV Q4H PRN


   PRN Reason: Pain, Moderate (4-6)


Ondansetron HCl (Ondansetron 4 Mg/2 Ml Inj)  4 mg IV Q8H PRN


   PRN Reason: Nausea And Vomiting


Sodium Chloride (Sodium Chloride 0.9% 10 Ml Flush Syringe)  10 ml IV BID TOMMY


   Last Admin: 05/23/21 22:46 Dose:  10 ml


   Documented by: 


Sodium Chloride (Sodium Chloride 0.9% 10 Ml Flush Syringe)  10 ml IV PRN PRN


   PRN Reason: LINE FLUSH











Physical Examination





- Vital Signs


Vital Signs: 


                                   Vital Signs











Temp Pulse Resp BP Pulse Ox


 


 100.1 F H  120 H  16   140/86   100 


 


 05/21/21 16:51  05/21/21 16:51  05/21/21 16:51  05/21/21 16:51  05/21/21 16:51














- Constitutional


General appearance: uncomfortable, other (slightly agitated with no clear speech

 out put)





- EENT


EENT: Present: PERRL





- Respiratory


Respiratory: Present: chest non-tender, lungs clear, rhonchi





- Cardiovascular


Cardiovascular: Present: regular rate, normal S1, normal S2


Extremities: Present: no peripheral edema bilatateraly





- Gastrointestinal


Gastrointestinal: Present: normoactive bowel sounds





- Integumentary


Integumentary: Present: normal





- Neurologic


Cranial nerve examination: PERRL, EOMI, other (she is with left facial droop , 

no speech out put ? comprehension)


Sensorimotor examination: other (right side weakness 3/5 upper and lower left is

 intact )





Results





- Laboratory Findings


CBC and BMP: 


                                 05/24/21 04:38





                                 05/24/21 04:38


Abnormal Lab Findings: 


                                  Abnormal Labs











  05/21/21 05/21/21 05/21/21





  17:55 17:55 Unknown


 


Mono % (Auto)   


 


Mono # (Auto)   


 


Seg Neutrophils %  74.4 H  


 


D-Dimer    373.6 H


 


Sodium   


 


Potassium   


 


BUN   


 


Creatinine   0.5 L 


 


Glucose   102 H 


 


POC Glucose   


 


Calcium   


 


Ferritin   


 


Albumin   














  05/21/21 05/22/21 05/22/21





  Unknown 06:38 06:38


 


Mono % (Auto)   7.4 H 


 


Mono # (Auto)   


 


Seg Neutrophils %   74.8 H 


 


D-Dimer   


 


Sodium   


 


Potassium   


 


BUN    6 L


 


Creatinine    0.5 L


 


Glucose    141 H


 


POC Glucose   


 


Calcium   


 


Ferritin  382.5 H  


 


Albumin    3.7 L














  05/22/21 05/23/21 05/23/21





  21:04 05:11 05:11


 


Mono % (Auto)   9.1 H 


 


Mono # (Auto)   0.9 H 


 


Seg Neutrophils %   71.2 H 


 


D-Dimer   


 


Sodium    136 L


 


Potassium   


 


BUN    6 L


 


Creatinine    0.5 L


 


Glucose    135 H


 


POC Glucose  115 H  


 


Calcium   


 


Ferritin   


 


Albumin   














  05/24/21





  04:38


 


Mono % (Auto) 


 


Mono # (Auto) 


 


Seg Neutrophils % 


 


D-Dimer 


 


Sodium 


 


Potassium  3.4 L


 


BUN  5 L


 


Creatinine  0.5 L


 


Glucose  124 H


 


POC Glucose 


 


Calcium  8.3 L


 


Ferritin 


 


Albumin 














Assessment and Plan


70-year-old female with past medical history of hemorrhagic CVA with residual 

right-sided weakness, dementia, COPD on 2 L nasal cannula oxygen brought in by 

family for altered mental status since last night.  Also patient has been having

 fever.  Patient had a temperature 103.5.  Patient with altered sensorium.  

Patient received first dose of Covid vaccination 3 weeks ago but is due for the 

second dose 1 week later.  No one at home is having fever any infections.  

Patient has  is none verbal  and coughing and desaturating.  No exacerbating or 

precipitating factors.  The main problem is fever and desaturation and altered 

mental status.  Chest x-ray in the ER revealed right-sided pneumonia which is 

developing.


CT brain is unremarkable


# Acute metabolic encephalopathy


Secondary to respiratory failure hypoxia and pneumonia


IV fluids and IV antibiotics


Check head CT


no speech out put ? comprehension


-Seizure can not be excluded





# CVA, old, cognitive deficits


- she is with no clear speech out put ? comprehension


-she is with right side weakness ? remote


-Now she is with left facial droop 


-Need MRI brain


-Echo


-EEG


-CTA brain and neck


-ASA and lipitor for now


-cardiac monitor


-SP and Pt evaluation


-NPO








# Acute respiratory failure with hypoxia


Patient is oxygen saturations are better on 4 L nasal cannula oxygen








# Pneumonia


IV ceftriaxone and IV azithromycin for now


Also rule out Covid infection








# Sepsis


IV Rocephin and IV Zithromax


Repeat chest x-ray in 48 to 72 hours








# Hypertension


Continue antihypertensives and adjust medications








# Hyperlipidemia


Continue statins


LDL#67








# DVT prophylaxis


On heparin and GI prophylaxis





will follow

## 2021-05-25 LAB
BUN SERPL-MCNC: 5 MG/DL (ref 7–17)
BUN/CREAT SERPL: 10 %
CALCIUM SERPL-MCNC: 8.3 MG/DL (ref 8.4–10.2)
HCT VFR BLD CALC: 35 % (ref 30.3–42.9)
HEMOLYSIS INDEX: 6
HGB BLD-MCNC: 11.9 GM/DL (ref 10.1–14.3)
MCHC RBC AUTO-ENTMCNC: 34 % (ref 30–34)
MCV RBC AUTO: 91 FL (ref 79–97)
PLATELET # BLD: 192 K/MM3 (ref 140–440)
RBC # BLD AUTO: 3.87 M/MM3 (ref 3.65–5.03)

## 2021-05-25 RX ADMIN — NYSTATIN SCH: 100000 SUSPENSION ORAL at 09:42

## 2021-05-25 RX ADMIN — POTASSIUM CHLORIDE SCH MLS/HR: 10 INJECTION, SOLUTION INTRAVENOUS at 09:43

## 2021-05-25 RX ADMIN — POTASSIUM CHLORIDE SCH MLS/HR: 10 INJECTION, SOLUTION INTRAVENOUS at 13:32

## 2021-05-25 RX ADMIN — ASPIRIN SCH: 81 TABLET, CHEWABLE ORAL at 09:43

## 2021-05-25 RX ADMIN — NYSTATIN SCH: 100000 SUSPENSION ORAL at 15:27

## 2021-05-25 RX ADMIN — FAMOTIDINE SCH MG: 10 INJECTION, SOLUTION INTRAVENOUS at 09:42

## 2021-05-25 RX ADMIN — CEFTRIAXONE SODIUM SCH MLS/HR: 1 INJECTION, POWDER, FOR SOLUTION INTRAMUSCULAR; INTRAVENOUS at 15:27

## 2021-05-25 RX ADMIN — Medication SCH ML: at 09:42

## 2021-05-25 RX ADMIN — Medication SCH ML: at 21:18

## 2021-05-25 RX ADMIN — DEXTROSE AND SODIUM CHLORIDE SCH MLS/HR: 5; .9 INJECTION, SOLUTION INTRAVENOUS at 01:42

## 2021-05-25 RX ADMIN — POTASSIUM CHLORIDE SCH MLS/HR: 10 INJECTION, SOLUTION INTRAVENOUS at 15:24

## 2021-05-25 RX ADMIN — POTASSIUM CHLORIDE SCH MLS/HR: 10 INJECTION, SOLUTION INTRAVENOUS at 12:00

## 2021-05-25 RX ADMIN — NYSTATIN SCH: 100000 SUSPENSION ORAL at 21:16

## 2021-05-25 RX ADMIN — NYSTATIN SCH: 100000 SUSPENSION ORAL at 20:00

## 2021-05-25 RX ADMIN — FAMOTIDINE SCH MG: 10 INJECTION, SOLUTION INTRAVENOUS at 21:17

## 2021-05-25 RX ADMIN — AZITHROMYCIN SCH MLS/HR: 500 INJECTION, POWDER, LYOPHILIZED, FOR SOLUTION INTRAVENOUS at 15:27

## 2021-05-25 RX ADMIN — CEFEPIME SCH MLS/HR: 2 INJECTION, POWDER, FOR SOLUTION INTRAVENOUS at 05:27

## 2021-05-25 NOTE — ELECTROCARDIOGRAPH REPORT
[facility]

                                       

Test Date:    2021               Test Time:    19:29:01

Pat Name:     TAIWO LOWERY         Department:   

Patient ID:   [id number]          Room:         A353 1

Gender:       F                        Technician:   NILESH

:          1950               Requested By: PATSY SIERRA

Order Number: [id number]              Reading MD:   Tam Dewey

                                 Measurements

Intervals                              Axis          

Rate:         97                       P:            48

UT:           180                      QRS:          -39

QRSD:         86                       T:            50

QT:           368                                    

QTc:          467                                    

                           Interpretive Statements

Sinus rhythm

Left axis deviation

Anteroseptal infarct, age indeterminate

No previous ECG available for comparison

Electronically Signed On 2021 9:58:50 EDT by Tam Dewey

## 2021-05-25 NOTE — PROGRESS NOTE
Assessment and Plan


70-year-old female with past medical history of hemorrhagic CVA with residual 

right-sided weakness, dementia, COPD on 2 L nasal cannula oxygen brought in by 

family for altered mental status since last night.  Also patient has been having

fever.  Patient had a temperature 103.5.  Patient with altered sensorium.  

Patient received first dose of Covid vaccination 3 weeks ago but is due for the 

second dose 1 week later.  No one at home is having fever any infections.  

Patient has  is none verbal  and coughing and desaturating.  No exacerbating or 

precipitating factors.  The main problem is fever and desaturation and altered 

mental status.  Chest x-ray in the ER revealed right-sided pneumonia which is 

developing.


CT brain is unremarkable


# Acute metabolic encephalopathy


Secondary to respiratory failure hypoxia and pneumonia


IV fluids and IV antibiotics


Check head CT


no speech out put ? comprehension


-Seizure can not be excluded





# CVA, old, cognitive deficits


- she is with no clear speech out put ? comprehension


-she is with right side weakness ? remote


-Now she is with left facial droop 


- MRI brain is remarkable for multipleBG and cerebellar infarct she is with 

cerebellar infarct and possibly amyloid angiopathy.


-Echo is pending


-EEG is pending


-CTA brain and neck showed mild atheroscerotic disease 


-ASA and lipitor for now, LDL#67,A1C#5.6


-cardiac monitor


-SP and Pt evaluation


-








# Acute respiratory failure with hypoxia


Patient is oxygen saturations are better on 4 L nasal cannula oxygen








# Pneumonia


IV ceftriaxone and IV azithromycin for now


Also rule out Covid infection








# Sepsis


IV Rocephin and IV Zithromax


Repeat chest x-ray in 48 to 72 hours








# Hypertension


Continue antihypertensives and adjust medications








# Hyperlipidemia


Continue statins


LDL#67








# DVT prophylaxis


On heparin and GI prophylaxis





PLAN


1- Fidings are consistent with Multiinfarct dementia 


2- she will benefit from peg tube feeding and Placement 





will sign off











Subjective


Date of service: 05/25/21


Principal diagnosis: weakness and lack of speech


Interval history: 





status is unchanged she is with diffuse weakness and slight improvment in  

mentation  no speech out put may be at time follow simple command 


she is evaluated for peg tube and possibly placement





Objective





- Vital Sign


                               Vital Signs - 12hr











  05/24/21 05/25/21 05/25/21





  23:57 00:00 06:55


 


Temperature 97.7 F  97.7 F


 


Pulse Rate 52 L  51 L


 


Respiratory 16 18 18





Rate   


 


Blood Pressure 128/69  


 


Blood Pressure   128/69





[Left]   


 


O2 Sat by Pulse 96 94 92





Oximetry   














- General Apperance


Constitutional: other (alert no speech out put )





- EENT


EENT: PERRL, mucous membranes moist





- Respiratory


Respiratory: chest non-tender, lungs clear, crackles





- Cardiovascular


Cardiovascular: regular rate, normal S1, normal S2


Extremities: no peripheral edema bilat, no clubbing, cyanosis





- Gastrointestinal


Gastrointestinal: normoactive bowel sounds





- Laboratory Findings


CBC and BMP: 


                                 05/25/21 05:34





                                 05/25/21 05:34


Abnormal Lab Findings: 


                                  Abnormal Labs











  05/21/21 05/21/21 05/21/21





  17:55 17:55 Unknown


 


Mono % (Auto)   


 


Mono # (Auto)   


 


Seg Neutrophils %  74.4 H  


 


D-Dimer    373.6 H


 


Sodium   


 


Potassium   


 


BUN   


 


Creatinine   0.5 L 


 


Glucose   102 H 


 


POC Glucose   


 


Calcium   


 


Ferritin   


 


Albumin   














  05/21/21 05/22/21 05/22/21





  Unknown 06:38 06:38


 


Mono % (Auto)   7.4 H 


 


Mono # (Auto)   


 


Seg Neutrophils %   74.8 H 


 


D-Dimer   


 


Sodium   


 


Potassium   


 


BUN    6 L


 


Creatinine    0.5 L


 


Glucose    141 H


 


POC Glucose   


 


Calcium   


 


Ferritin  382.5 H  


 


Albumin    3.7 L














  05/22/21 05/23/21 05/23/21





  21:04 05:11 05:11


 


Mono % (Auto)   9.1 H 


 


Mono # (Auto)   0.9 H 


 


Seg Neutrophils %   71.2 H 


 


D-Dimer   


 


Sodium    136 L


 


Potassium   


 


BUN    6 L


 


Creatinine    0.5 L


 


Glucose    135 H


 


POC Glucose  115 H  


 


Calcium   


 


Ferritin   


 


Albumin   














  05/24/21 05/25/21





  04:38 05:34


 


Mono % (Auto)  


 


Mono # (Auto)  


 


Seg Neutrophils %  


 


D-Dimer  


 


Sodium  


 


Potassium  3.4 L  2.8 L*


 


BUN  5 L  5 L


 


Creatinine  0.5 L  0.5 L


 


Glucose  124 H  110 H


 


POC Glucose  


 


Calcium  8.3 L  8.3 L


 


Ferritin  


 


Albumin

## 2021-05-25 NOTE — PROGRESS NOTE
Assessment and Plan


Assessment and plan: 


70-year-old -American female with a past medical history as below who 

presented with acute encephalopathy and sudden aphasia.  Patient is being 

treated for acute community-acquired pneumonia and work-up for altered mental 

status at this time.





Assessment and plan


--Dysphagia; failed swallow eval


Speech therapist recommended PEG placement


Family agreed, GI consulted for PEG placement





--Severe hypokalemia; K2.8


40 mEq KCl IV, at 20 mL KCl IV fluids


Check magnesium, follow electrolytes





--Acute metabolic encephalopathy;


Multifactorial, multiple strokes, multi-infarct dementia


Hypoxia, pneumonia, respiratory failure


IV fluids, supportive care


CVA work-up negative





Neuro work-up so far:


CT head without contrast no acute abnormality


MRI brain; no acute abnormality, chronic microvascular disease


CTA head; no evidence of large vessel occlusion involving intracranial vessel


CTA neck; mild calcification involving carotid bifurcations bilaterally without 

significant stenosis.  


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

artery which was also present in 2019





--Acute respiratory failure with hypoxia


Patient is requiring 3 L nasal cannula oxygen


Titrate O2 sats to more than 90%





--Pneumonia, community-acquired


IV ceftriaxone and IV azithromycin , follow cultures for now


Covid infection negative.





--Sepsis secondary to community-acquired pneumonia 


IV Rocephin and IV Zithromax


Follow cultures, oxygen titrate O2 sats to more than 90%





--Multi-infarct dementia 


CVA, old, cognitive deficits,Supportive care


Repeat CT of the head without any abnormalities of acute CVA


Neurology following





--Hypertension


Continue amlodipine


IV hydralazine with parameters





--Hyperlipidemia;Continue statins





--DVT prophylaxis


Hold heparin, for possible PEG tomorrow





CODE STATUS: Full





Speech and swallow evaluated the patient, failed swallow eval, recommend PEG 

placement


GI consult for possible PEG tomorrow








History


Interval history: 





5/22/2021: Patient seen and examined, patient is nonverbal, looking around the 

room, no distress.  Spoke with the nurse today, patient is not taking any oral 

medication or eating.  Patient febrile overnight.


5/22/2021: Patient seen this morning, continues to be nonverbal, no overnight 

events, patient afebrile.  Covid is negative.  Updated the family yesterday 

pertaining to the patient's condition.


5/23/2021: Patient seen this morning, continues to be aphasic, not responsive to

verbal cues.  But in no acute distress.  Patient was febrile overnight 101 

fever.  Spoke with the family, they will come in and try to feed the patient 

since she is not taking in any p.o. intake, continues to be on fluids





5/25/2021; failed swallow evaluation, recommend PEG, GI consulted, patient 

n.p.o. status from midnight


Hold antiplatelets, and anticoagulants











History


Interval history: 


I have seen and examined the patient at the bedside this morning


Patient's chart and medications reviewed


Patient family member at the bedside


Speech therapist conducted swallow evaluation


Abnormal, high risk for aspiration, recommend PEG placement


Patient alert and awake, noncommunicative, cachectic


Vital signs noted








Hospitalist Physical





- Constitutional


Vitals: 


                                        











Temp Pulse Resp BP Pulse Ox


 


 97.7 F   51 L  18   128/69   92 


 


 05/25/21 06:55  05/25/21 06:55  05/25/21 06:55  05/25/21 06:55  05/25/21 06:55











General appearance: Present: no acute distress, well-nourished, other 

(Cachectic)





- EENT


Eyes: Present: PERRL, EOM intact





- Neck


Neck: Present: supple, normal ROM





- Respiratory


Respiratory effort: normal


Respiratory: bilateral: diminished, negative: rales, rhonchi, wheezing





- Cardiovascular


Rhythm: regular


Heart Sounds: Present: S1 & S2





- Extremities


Extremities: no ischemia, No edema





- Abdominal


General gastrointestinal: soft, non-tender, non-distended, normal bowel sounds





- Integumentary


Integumentary: Present: clear, warm





- Psychiatric


Psychiatric: appropriate mood/affect, cooperative





HEART Score





- HEART Score


Troponin: 


                                        











Troponin T  < 0.010 ng/mL (0.00-0.029)   05/21/21  Unknown














Results





- Labs


CBC & Chem 7: 


                                 05/25/21 05:34





                                 05/25/21 05:34


Labs: 


                             Laboratory Last Values











WBC  7.2 K/mm3 (4.5-11.0)   05/25/21  05:34    


 


RBC  3.87 M/mm3 (3.65-5.03)   05/25/21  05:34    


 


Hgb  11.9 gm/dl (10.1-14.3)   05/25/21  05:34    


 


Hct  35.0 % (30.3-42.9)   05/25/21  05:34    


 


MCV  91 fl (79-97)   05/25/21  05:34    


 


MCH  31 pg (28-32)   05/25/21  05:34    


 


MCHC  34 % (30-34)   05/25/21  05:34    


 


RDW  13.8 % (13.2-15.2)   05/25/21  05:34    


 


Plt Count  192 K/mm3 (140-440)   05/25/21  05:34    


 


Lymph % (Auto)  18.9 % (13.4-35.0)   05/23/21  05:11    


 


Mono % (Auto)  9.1 % (0.0-7.3)  H  05/23/21  05:11    


 


Eos % (Auto)  0.2 % (0.0-4.3)   05/23/21  05:11    


 


Baso % (Auto)  0.6 % (0.0-1.8)   05/23/21  05:11    


 


Lymph # (Auto)  1.9 K/mm3 (1.2-5.4)   05/23/21  05:11    


 


Mono # (Auto)  0.9 K/mm3 (0.0-0.8)  H  05/23/21  05:11    


 


Eos # (Auto)  0.0 K/mm3 (0.0-0.4)   05/23/21  05:11    


 


Baso # (Auto)  0.1 K/mm3 (0.0-0.1)   05/23/21  05:11    


 


Seg Neutrophils %  71.2 % (40.0-70.0)  H  05/23/21  05:11    


 


Seg Neutrophils #  7.2 K/mm3 (1.8-7.7)   05/23/21  05:11    


 


APTT  31.1 Sec. (24.2-36.6)   05/21/21  17:55    


 


D-Dimer  373.6 ng/mlDDU (0-234)  H  05/21/21  Unknown


 


Sodium  140 mmol/L (137-145)   05/25/21  05:34    


 


Potassium  2.8 mmol/L (3.6-5.0)  L*  05/25/21  05:34    


 


Chloride  106.4 mmol/L ()   05/25/21  05:34    


 


Carbon Dioxide  27 mmol/L (22-30)   05/25/21  05:34    


 


Anion Gap  9 mmol/L  05/25/21  05:34    


 


BUN  5 mg/dL (7-17)  L  05/25/21  05:34    


 


Creatinine  0.5 mg/dL (0.6-1.2)  L  05/25/21  05:34    


 


Estimated GFR  > 60 ml/min  05/25/21  05:34    


 


BUN/Creatinine Ratio  10 %  05/25/21  05:34    


 


Glucose  110 mg/dL ()  H  05/25/21  05:34    


 


POC Glucose  115 mg/dL ()  H  05/22/21  21:04    


 


Hemoglobin A1c  5.5 % (4-6)   05/22/21  06:38    


 


Lactic Acid  0.70 mmol/L (0.7-2.0)   05/21/21  20:36    


 


Calcium  8.3 mg/dL (8.4-10.2)  L  05/25/21  05:34    


 


Ferritin  382.5 ng/mL (10.0-200.0)  H  05/21/21  Unknown


 


Total Bilirubin  0.70 mg/dL (0.1-1.2)   05/22/21  06:38    


 


AST  18 units/L (5-40)   05/22/21  06:38    


 


ALT  14 units/L (7-56)   05/22/21  06:38    


 


Alkaline Phosphatase  76 units/L ()   05/22/21  06:38    


 


Lactate Dehydrogenase  180 units/L ()   05/21/21  Unknown


 


Troponin T  < 0.010 ng/mL (0.00-0.029)   05/21/21  Unknown


 


C-Reactive Protein  0.00 mg/dL (0.00-1.30)   05/21/21  Unknown


 


NT-Pro-B Natriuret Pep  106.8 pg/mL (0-900)   05/21/21  Unknown


 


Total Protein  7.1 g/dL (6.3-8.2)   05/22/21  06:38    


 


Albumin  3.7 g/dL (3.9-5)  L  05/22/21  06:38    


 


Albumin/Globulin Ratio  1.1 %  05/22/21  06:38    


 


Procalcitonin  < 0.05 ng/mL (<0.15)   05/21/21  Unknown


 


Urine Color  Yellow  (Yellow)   05/22/21  Unknown


 


Urine Turbidity  Clear  (Clear)   05/22/21  Unknown


 


Urine pH  6.0  (5.0-7.0)   05/22/21  Unknown


 


Ur Specific Gravity  1.014  (1.003-1.030)   05/22/21  Unknown


 


Urine Protein  <15 mg/dl mg/dL (Negative)   05/22/21  Unknown


 


Urine Glucose (UA)  Neg mg/dL (Negative)   05/22/21  Unknown


 


Urine Ketones  Neg mg/dL (Negative)   05/22/21  Unknown


 


Urine Blood  Neg  (Negative)   05/22/21  Unknown


 


Urine Nitrite  Neg  (Negative)   05/22/21  Unknown


 


Urine Bilirubin  Neg  (Negative)   05/22/21  Unknown


 


Urine Urobilinogen  < 2.0 mg/dL (<2.0)   05/22/21  Unknown


 


Ur Leukocyte Esterase  Neg  (Negative)   05/22/21  Unknown


 


Urine WBC (Auto)  1.0 /HPF (0.0-6.0)   05/22/21  Unknown


 


Urine RBC (Auto)  < 1.0 /HPF (0.0-6.0)   05/22/21  Unknown


 


U Epithel Cells (Auto)  1.0 /HPF (0-13.0)   05/22/21  Unknown


 


Urine Mucus  Few /HPF  05/22/21  Unknown


 


Coronavirus (PCR)  Negative  (Negative)   05/22/21  Unknown











Microbiology: 


Microbiology





05/21/21 17:55   Peripheral/Venous   Blood Culture - Preliminary


                            NO GROWTH AFTER 72 HOURS


05/21/21 17:55   Peripheral/Venous   Blood Culture - Preliminary


05/22/21 Unknown   Urine,Clean Catch   Urine Culture - Final


                                NO GROWTH AFTER 48 HOURS








Gonsalez/IV: 


                                        





Voiding Method                   External Female Catheter











Active Medications





- Current Medications


Current Medications: 














Generic Name Dose Route Start Last Admin





  Trade Name Freq  PRN Reason Stop Dose Admin


 


Acetaminophen  650 mg  05/21/21 22:19 





  Acetaminophen 325 Mg Tab  PO  





  Q4H PRN  





  Pain MILD(1-3)/Fever >100.5/HA  


 


Acetaminophen  325 mg  05/22/21 10:30  05/23/21 17:09





  Acetaminophen 325 Mg Rect Supp  IL   325 mg





  Q6H PRN   Administration





  Fever >101  


 


Amlodipine Besylate  5 mg  05/22/21 10:00  05/24/21 10:35





  Amlodipine 5 Mg Tab  PO   Not Given





  QDAY TOMMY  


 


Aspirin  81 mg  05/22/21 10:00  05/24/21 10:35





  Aspirin 81 Mg Tab Chew  PO   Not Given





  QDAY TOMMY  


 


Atorvastatin Calcium  80 mg  05/22/21 10:00  05/24/21 10:35





  Atorvastatin 40 Mg Tab  PO   Not Given





  DAILY [facility]  


 


Famotidine  20 mg  05/21/21 23:00  05/24/21 22:03





  Famotidine 20 Mg/2 Ml Inj  IV   20 mg





  BID TOMMY   Administration


 


Hydralazine HCl  50 mg  05/21/21 23:00  05/25/21 06:55





  Hydralazine 25 Mg Tab  PO   Not Given





  Q8HR TOMMY  


 


Hydralazine HCl  10 mg  05/22/21 10:30 





  Hydralazine 20 Mg/1 Ml Inj  IV  





  Q4HR PRN  





  Blood Pressure  


 


Vancomycin HCl 1,250 mg/  275 mls @ 137.5 mls/hr  05/22/21 18:00  05/24/21 18:03





  Sodium Chloride  IV   137.5 mls/hr





  Q24H TOMMY   Administration


 


Cefepime HCl  2 gm in 100 mls @ 200 mls/hr  05/22/21 06:00  05/25/21 05:27





  Cefepime/Ns 2 Gm/100 Ml  IV   200 mls/hr





  Q12H TOMMY   Administration





  Protocol  


 


Potassium Chloride  10 meq in 100 mls @ 100 mls/hr  05/25/21 09:00 





  Kcl 10meq/100ml  IV  05/25/21 12:59 





  Q1H [facility]  


 


Potassium Chloride 20 meq/  1,010 mls @ 125 mls/hr  05/25/21 08:49 





  Dextrose/Sodium Chloride  IV  





  AS DIRECT [facility]  


 


Metoclopramide HCl  10 mg  05/21/21 22:19 





  Metoclopramide 10 Mg/2 Ml Inj  IV  





  Q6H PRN  





  Nausea And Vomiting  


 


Morphine Sulfate  2 mg  05/21/21 22:19 





  Morphine 2 Mg/1 Ml Inj  IV  





  Q4H PRN  





  Pain, Moderate (4-6)  


 


Nystatin  500,000 unit  05/24/21 18:00  05/24/21 22:02





  Nystatin 500,000 Unit/5 Ml Oral Liqd  PO   Not Given





  QID [facility]  


 


Ondansetron HCl  4 mg  05/21/21 22:19 





  Ondansetron 4 Mg/2 Ml Inj  IV  





  Q8H PRN  





  Nausea And Vomiting  


 


Sodium Chloride  10 ml  05/22/21 10:00  05/24/21 22:05





  Sodium Chloride 0.9% 10 Ml Flush Syringe  IV   10 ml





  BID TOMMY   Administration


 


Sodium Chloride  10 ml  05/21/21 22:19 





  Sodium Chloride 0.9% 10 Ml Flush Syringe  IV  





  PRN PRN  





  LINE FLUSH  














Nutrition/Malnutrition Assess





- Dietary Evaluation


Nutrition/Malnutrition Findings: 


                                        





Nutrition Notes                                            Start:  05/22/21 

10:14


Freq:                                                      Status: Active       




Protocol:                                                                       




 Document     05/24/21 13:02  AL  (Rec: 05/24/21 13:19  AL  [id number])


 Co-Sign      05/24/21 13:02  LP


 Nutrition Notes


     Initial or Follow up                        Assessment


     Current Diagnosis                           COPD,Sepsis,Hypertension,


                                                 Respiratory Failure,Stroke


     Other Pertinent Diagnosis                   Acute dysphagia,


                                                 hyperlipidemia, Dementia, pneu


                                                 ,


     Current Diet                                NPO


     Labs/Tests                                  K 3.4


                                                 BUN 5


                                                 Cr .5


     Pertinent Medications                       D5NS at 125 ml/hr


     Height                                      5 ft 5 in


     Weight                                      66.2 kg


     Ideal Body Weight (kg)                      56.81


     BMI                                         24.3


     Weight change and time frame                wt change noted


     Weight Status                               Appropriate


     Subjective/Other Information                F/U for intakes and ONS


                                                 tolerence. Pt at CT scan


                                                 during time of visit. Per RN,


                                                 pt failed speech eval and is


                                                 NPO until further notice. Will


                                                 F/U for diet advancement or


                                                 TF consult.


     Percent of energy/protein needs met:        0%/0%


     Burn                                        Absent


     Trauma                                      Absent


     Difficulty In                               Swallowing


     Current % PO                                Negligible


     Minimum of two criteria                     No


     #2


      Nutrition Diagnosis                        Inadequate oral intake


      Etiology                                   dysphagia


      As Evidenced by Signs and Symptoms         Pt is NPO


     #1


      Nutrition Diagnosis                        Inadequate energy intake


      Comments:                                  CHANGED


      Etiology                                   Advanced age/dementia


      As Evidenced by Signs and Symptoms         difficulty swallowing and pt


                                                 currently NPO


      Diagnosis Progress(for reassessment        Worsened


       documentation)                            


     Is patient on ventilator?                   No


     Is Patient Ambulatory and/or Out of Bed     No


     REE-([facility]-confined to bed)      0283.562


     Calculation Used for Recommendations        [facility]


     Additional Notes                            Protein needs are 72-86g (1-1.


                                                 2g/kg)


                                                 Fluid needs are 1ml/kcal


 Nutrition Intervention


     Change Diet Order:                          Recommend TF


     Nutrition Support:                          Recommend Jevity 1.2 at 50 ml/


                                                 hr


                                                 Free water flush 80 mlq4h


     Kcal                                        1,440


     Protein (gm)                                67


     Fluid (mL)                                  969


     Add Supplement/Snack (indicate name/kcal    d/c


      /protein )                                 


     Goal #1                                     Diet advancement or initiation


                                                 of TF when medically feasible


                                                 to meet needs


     Anticipated Discharge Needs:                Unable to determine at this


                                                 time.


     Follow-Up By:                               05/26/21


     Additional Comments                         F/U for diet advancement or TF


                                                 consult.

## 2021-05-25 NOTE — CONSULTATION
History of Present Illness





- Reason for Consult


Consult date: 05/25/21


fever


Requesting physician: ANN COBB





- History of Present Illness





The patient is a 70-year-old female with prior CVA, residual right-sided 

weakness, dementia, COPD was admitted to the hospital on 5/21/2021 with fever 

and change in her mental status. Chest x-ray in the ER showed right-sided 

pneumonia. Her brain MRI showed no acute abnormality, showed severe chronic 

microvascular disease. Head and neck CTA did not reveal any acute occlusive 

disease, there was moderate narrowing of the origin of the left vertebral artery

similar to prior exam. ID was consulted due to recurrent fever. Last fever was 

on 5/23/2021 at 5 PM.





Review of Systems: 


Limited due to AMS





Past History


Past Medical History: hypertension, stroke





Medications and Allergies


                                    Allergies











Allergy/AdvReac Type Severity Reaction Status Date / Time


 


No Known Allergies Allergy   Verified 09/18/15 07:34











                                Home Medications











 Medication  Instructions  Recorded  Confirmed  Last Taken  Type


 


Aspirin [Aspirin BABY CHEW TAB] 81 mg PO QDAY #30 tab.chew 08/05/19 05/21/21 05/21/21 Rx


 


amLODIPine 5 mg PO QDAY #30 tablet 08/05/19 05/21/21 05/21/21 Rx


 


hydrALAZINE [Apresoline TAB] 50 mg PO Q8HR #90 tablet 08/05/19 05/21/21 05/21/21

Rx


 


AtorvaSTATin [Lipitor] 80 mg PO DAILY 05/21/21 05/21/21 05/21/21 History











Active Meds: 


Active Medications





Acetaminophen (Acetaminophen 325 Mg Tab)  650 mg PO Q4H PRN


   PRN Reason: Pain MILD(1-3)/Fever >100.5/HA


Acetaminophen (Acetaminophen 325 Mg Rect Supp)  325 mg ME Q6H PRN


   PRN Reason: Fever >101


   Last Admin: 05/23/21 17:09 Dose:  325 mg


   Documented by: 


Amlodipine Besylate (Amlodipine 5 Mg Tab)  5 mg PO QDAY On license of [facility]


   Last Admin: 05/25/21 09:43 Dose:  Not Given


   Documented by: 


Aspirin (Aspirin 81 Mg Tab Chew)  81 mg PO QDAY On license of [facility]


   Last Admin: 05/25/21 09:43 Dose:  Not Given


   Documented by: 


Atorvastatin Calcium (Atorvastatin 40 Mg Tab)  80 mg PO DAILY On license of [facility]


   Last Admin: 05/25/21 09:42 Dose:  Not Given


   Documented by: 


Famotidine (Famotidine 20 Mg/2 Ml Inj)  20 mg IV BID On license of [facility]


   Last Admin: 05/25/21 09:42 Dose:  20 mg


   Documented by: 


Hydralazine HCl (Hydralazine 25 Mg Tab)  50 mg PO Q8HR On license of [facility]


   Last Admin: 05/25/21 06:55 Dose:  Not Given


   Documented by: 


Hydralazine HCl (Hydralazine 20 Mg/1 Ml Inj)  10 mg IV Q4HR PRN


   PRN Reason: Blood Pressure


Vancomycin HCl 1,250 mg/ (Sodium Chloride)  275 mls @ 137.5 mls/hr IV Q24H On license of [facility]


   Last Admin: 05/24/21 18:03 Dose:  137.5 mls/hr


   Documented by: 


Cefepime HCl (Cefepime/Ns 2 Gm/100 Ml)  2 gm in 100 mls @ 200 mls/hr IV Q12H 

On license of [facility]; Protocol


   Last Admin: 05/25/21 05:27 Dose:  200 mls/hr


   Documented by: 


Potassium Chloride/Dextrose/Sod Cl (D5w/Ns W/Kcl 20meq)  20 meq in 1,000 mls @ 

125 mls/hr IV AS DIRECT On license of [facility]


Metoclopramide HCl (Metoclopramide 10 Mg/2 Ml Inj)  10 mg IV Q6H PRN


   PRN Reason: Nausea And Vomiting


Morphine Sulfate (Morphine 2 Mg/1 Ml Inj)  2 mg IV Q4H PRN


   PRN Reason: Pain, Moderate (4-6)


Nystatin (Nystatin 500,000 Unit/5 Ml Oral Liqd)  500,000 unit PO QID On license of [facility]


   Last Admin: 05/25/21 09:42 Dose:  Not Given


   Documented by: 


Ondansetron HCl (Ondansetron 4 Mg/2 Ml Inj)  4 mg IV Q8H PRN


   PRN Reason: Nausea And Vomiting


Sodium Chloride (Sodium Chloride 0.9% 10 Ml Flush Syringe)  10 ml IV BID On license of [facility]


   Last Admin: 05/25/21 09:42 Dose:  10 ml


   Documented by: 


Sodium Chloride (Sodium Chloride 0.9% 10 Ml Flush Syringe)  10 ml IV PRN PRN


   PRN Reason: LINE FLUSH











Physical Examination





- Physical Exam


Narrative exam: 





Physical Exam: 


Constitutional: Awake, alert, tracks, nonverbal


Head, Ears, Nose: Normocephalic, atraumatic. External ears, nose normal


Eyes: Conjunctivae/corneas clear. No icterus. No ptosis.


Neck: Supple, no meningeal signs


Cardiovascular: S1, S2 normal. 


Respiratory: Good air entry, clear to auscultation bilaterally


GI: Soft, non-tender; bowel sounds normal. No peritoneal signs


Musculoskeletal: No pedal edema, no cyanosis.


Skin: No rash or abscess


Hem/Lymphatic: No palpable cervical or supraclavicular nodes. No lymphangitis


Psych: no agitation


Neurological: Awake, alert, tracks, nonverbal





- Constitutional


Vitals: 


                                   Vital Signs











Temp Pulse Resp BP Pulse Ox


 


 99.1 F   66   18   143/82   93 


 


 05/25/21 14:04  05/25/21 14:04  05/25/21 14:04  05/25/21 14:04  05/25/21 14:04








                           Temperature -Last 24 Hours











Temperature                    99.1 F


 


Temperature                    97.7 F


 


Temperature                    97.7 F


 


Temperature                    97.6 F

















Results





- Labs


CBC & Chem 7: 


                                 05/25/21 05:34





                                 05/25/21 05:34


Labs: 


                              Abnormal lab results











  05/25/21 Range/Units





  05:34 


 


Potassium  2.8 L*  (3.6-5.0)  mmol/L


 


BUN  5 L  (7-17)  mg/dL


 


Creatinine  0.5 L  (0.6-1.2)  mg/dL


 


Glucose  110 H  ()  mg/dL


 


Calcium  8.3 L  (8.4-10.2)  mg/dL














- Imaging and Cardiology


Chest x-ray: report reviewed, image reviewed (r pneumonia)





Assessment and Plan





Cultures:


5/21/2021 blood culture: 1 out of 4 bottles positive with growth of gram-

positive rods


5/22/2021 urine culture: No growth





A/P:


70-year-old female with prior CVA, residual right-sided weakness, dementia, COPD

 was admitted to the hospital on 5/21/2021 with fever and change in her mental 

status:





#Fever: likely secondary to right-sided pneumonia. Improving. Complete 

antibiotic course. On room air. No leukocytosis.





#Acute encephalopathy: Neurology on board. Imaging negative for acute 

abnormality in the brain. Seemingly improved.





#GPR bacteremia: 1 out of 4 bottles positive, likely contaminant.





Recs:


-IV ceftriaxone 1 g daily plus IV azithromycin 500 mg for 1 more day. Orders 

placed.





Kiya Humphrey MD, FACP


[facility] ([facility])


O: [phone]


F: [phone]

## 2021-05-26 ENCOUNTER — OUT OF OFFICE VISIT (OUTPATIENT)
Dept: URBAN - METROPOLITAN AREA MEDICAL CENTER 41 | Facility: MEDICAL CENTER | Age: 71
End: 2021-05-26
Payer: MEDICARE

## 2021-05-26 DIAGNOSIS — R13.12 DYSPHAGIA, OROPHARYNGEAL: ICD-10-CM

## 2021-05-26 LAB
APTT BLD: 30.3 SEC. (ref 24.2–36.6)
BUN SERPL-MCNC: 4 MG/DL (ref 7–17)
BUN/CREAT SERPL: 8 %
CALCIUM SERPL-MCNC: 8.2 MG/DL (ref 8.4–10.2)
HEMOLYSIS INDEX: 24
INR PPP: 1.23 (ref 0.87–1.13)

## 2021-05-26 PROCEDURE — G8427 DOCREV CUR MEDS BY ELIG CLIN: HCPCS | Performed by: INTERNAL MEDICINE

## 2021-05-26 PROCEDURE — 99221 1ST HOSP IP/OBS SF/LOW 40: CPT | Performed by: INTERNAL MEDICINE

## 2021-05-26 PROCEDURE — 43246 EGD PLACE GASTROSTOMY TUBE: CPT | Performed by: INTERNAL MEDICINE

## 2021-05-26 RX ADMIN — NYSTATIN SCH: 100000 SUSPENSION ORAL at 10:59

## 2021-05-26 RX ADMIN — DEXTROSE, SODIUM CHLORIDE, AND POTASSIUM CHLORIDE SCH MLS/HR: 5; .9; .15 INJECTION INTRAVENOUS at 23:39

## 2021-05-26 RX ADMIN — Medication SCH ML: at 21:40

## 2021-05-26 RX ADMIN — FAMOTIDINE SCH MG: 10 INJECTION, SOLUTION INTRAVENOUS at 10:58

## 2021-05-26 RX ADMIN — FAMOTIDINE SCH MG: 10 INJECTION, SOLUTION INTRAVENOUS at 21:40

## 2021-05-26 RX ADMIN — AZITHROMYCIN SCH MLS/HR: 500 INJECTION, POWDER, LYOPHILIZED, FOR SOLUTION INTRAVENOUS at 10:59

## 2021-05-26 RX ADMIN — CEFTRIAXONE SODIUM SCH MLS/HR: 1 INJECTION, POWDER, FOR SOLUTION INTRAMUSCULAR; INTRAVENOUS at 10:59

## 2021-05-26 RX ADMIN — NYSTATIN SCH: 100000 SUSPENSION ORAL at 21:49

## 2021-05-26 RX ADMIN — DEXTROSE, SODIUM CHLORIDE, AND POTASSIUM CHLORIDE SCH MLS/HR: 5; .9; .15 INJECTION INTRAVENOUS at 03:27

## 2021-05-26 RX ADMIN — NYSTATIN SCH: 100000 SUSPENSION ORAL at 17:40

## 2021-05-26 RX ADMIN — NYSTATIN SCH UNIT: 100000 SUSPENSION ORAL at 21:40

## 2021-05-26 RX ADMIN — Medication SCH ML: at 10:59

## 2021-05-26 NOTE — XRAY REPORT
XR abdomen 1V ap



INDICATION:

tube placement.



COMPARISON:

None available.



FINDINGS:

The tip of the feeding tube projects over the distal stomach.



Signer Name: Roe Simon MD 

Signed: 5/26/2021 5:36 PM

Workstation Name: [id number]

## 2021-05-26 NOTE — PROGRESS NOTE
Assessment and Plan


Cultures:


5/21/2021 blood culture: 1 out of 4 bottles positive with growth of gram-

positive rods: Diptheroids


5/22/2021 urine culture: No growth





A/P:


70-year-old female with prior CVA, residual right-sided weakness, dementia, COPD

was admitted to the hospital on 5/21/2021 with fever and change in her mental 

status:





#Fever: likely secondary to right-sided pneumonia. Improving. Complete 

antibiotic course. On room air. No leukocytosis.





#Acute encephalopathy: Neurology on board. Imaging negative for acute 

abnormality in the brain. Seemingly improved.





#GPR bacteremia: 1 out of 4 bottles positive for diptheroids, likely 

contaminant.





Recs:


-stop abx after today


-noted plans for hospice





ID will sign off. Please call with questions. 





Kiya Humphrey MD, FACP


[facility] ([facility]


O: [phone]


F: [phone]





Subjective


Date of service: 05/26/21


Principal diagnosis: weakness and lack of speech


Interval history: 





No fever. Non verbal. Awake. 





Objective





- Exam


Narrative Exam: 





Physical Exam: 


Constitutional: Awake, alert, tracks, nonverbal


Head, Ears, Nose: Normocephalic, atraumatic. External ears, nose normal


Eyes: Conjunctivae/corneas clear. No icterus. No ptosis.


Neck: Supple, no meningeal signs


Cardiovascular: S1, S2 normal. 


Respiratory: Good air entry, clear to auscultation bilaterally


GI: Soft, non-tender; bowel sounds normal. No peritoneal signs


Musculoskeletal: No pedal edema, no cyanosis.


Skin: No rash or abscess


Hem/Lymphatic: No palpable cervical or supraclavicular nodes. No lymphangitis


Psych: no agitation


Neurological: Awake, alert, tracks, nonverbal 





- Constitutional


Vitals: 


                                   Vital Signs











Temp Pulse Resp BP Pulse Ox


 


 99.1 F   58 L  18   168/83   97 


 


 05/26/21 11:21  05/26/21 11:21  05/26/21 11:21  05/26/21 11:21 05/26/21 11:21








                           Temperature -Last 24 Hours











Temperature                    99.1 F


 


Temperature                    98.0 F


 


Temperature                    99.1 F


 


Temperature                    97.4 F


 


Temperature                    99.8 F


 


Temperature                    99.1 F


 


Temperature                    98.0 F


 


Temperature                    99.8 F

















- Labs


CBC & Chem 7: 


                                 05/25/21 05:34





                                 05/26/21 04:56


Labs: 


                              Abnormal lab results











  05/25/21 05/26/21 05/26/21 Range/Units





  19:48 04:56 04:56 


 


PT    15.3 H  (12.2-14.9)  Sec.


 


INR    1.23 H  (0.87-1.13)  


 


Potassium  3.4 L D  3.4 L   (3.6-5.0)  mmol/L


 


BUN   4 L   (7-17)  mg/dL


 


Creatinine   0.5 L   (0.6-1.2)  mg/dL


 


Glucose   104 H   ()  mg/dL


 


Calcium   8.2 L   (8.4-10.2)  mg/dL


 


Magnesium  1.60 L    (1.7-2.3)  mg/dL

## 2021-05-26 NOTE — CONSULTATION
History of Present Illness





- Reason for Consult


Consult date: 05/26/21


Oropharyngeal dysphagia


Requesting physician: AMY J KOCHERLA





- History of Present Illness


Ms. Blanco is a 70-year-old woman with a history of right-sided CVA and mild 

dementia according to the patient's daughter, Neha Blanco.  Patient was found

to have right-sided pneumonia and is being treated for antibiotics.  She is 

unable to demonstrate safe swallowing.  Prior to admission, daughter states 

patient was walking with a walker and able to converse.


Currently, regards speaker.  Shakes her head no to all questions, and does not 

seem to comprehend.








Past History


Past Medical History: COPD, hypertension, stroke


Past Surgical History: No surgical history


Social history: no significant social history


Family history: no significant family history





Medications and Allergies


                                    Allergies











Allergy/AdvReac Type Severity Reaction Status Date / Time


 


No Known Allergies Allergy   Verified 09/18/15 07:34











                                Home Medications











 Medication  Instructions  Recorded  Confirmed  Last Taken  Type


 


Aspirin [Aspirin BABY CHEW TAB] 81 mg PO QDAY #30 tab.chew 08/05/19 05/21/21 05/21/21 Rx


 


amLODIPine 5 mg PO QDAY #30 tablet 08/05/19 05/21/21 05/21/21 Rx


 


hydrALAZINE [Apresoline TAB] 50 mg PO Q8HR #90 tablet 08/05/19 05/21/21 05/21/21

Rx


 


AtorvaSTATin [Lipitor] 80 mg PO DAILY 05/21/21 05/21/21 05/21/21 History











Active Meds: 


Active Medications





Acetaminophen (Acetaminophen 325 Mg Tab)  650 mg PO Q4H PRN


   PRN Reason: Pain MILD(1-3)/Fever >100.5/HA


Acetaminophen (Acetaminophen 325 Mg Rect Supp)  325 mg ME Q6H PRN


   PRN Reason: Fever >101


   Last Admin: 05/23/21 17:09 Dose:  325 mg


   Documented by: 


Amlodipine Besylate (Amlodipine 5 Mg Tab)  5 mg PO QDAY [facility]


   Last Admin: 05/25/21 09:43 Dose:  Not Given


   Documented by: 


Atorvastatin Calcium (Atorvastatin 40 Mg Tab)  80 mg PO DAILY [facility]


   Last Admin: 05/25/21 09:42 Dose:  Not Given


   Documented by: 


Famotidine (Famotidine 20 Mg/2 Ml Inj)  20 mg IV BID [facility]


   Last Admin: 05/25/21 21:17 Dose:  20 mg


   Documented by: 


Hydralazine HCl (Hydralazine 25 Mg Tab)  50 mg PO Q8HR [facility]


   Last Admin: 05/25/21 21:16 Dose:  Not Given


   Documented by: 


Hydralazine HCl (Hydralazine 20 Mg/1 Ml Inj)  10 mg IV Q4HR PRN


   PRN Reason: Blood Pressure


Potassium Chloride/Dextrose/Sod Cl (D5w/Ns W/Kcl 20meq)  20 meq in 1,000 mls @ 

125 mls/hr IV AS DIRECT [facility]


   Last Admin: 05/26/21 03:27 Dose:  125 mls/hr


   Documented by: 


Ceftriaxone Sodium (Rocephin/Ns 1 Gm/50 Ml)  1 gm in 50 mls @ 100 mls/hr IV 

Q24HR [facility]; Protocol


   Last Admin: 05/25/21 15:27 Dose:  100 mls/hr


   Documented by: 


Azithromycin (Zithromax/Ns)  500 mg in 250 mls @ 250 mls/hr IV Q24HR [facility]


   Last Admin: 05/25/21 15:27 Dose:  250 mls/hr


   Documented by: 


Metoclopramide HCl (Metoclopramide 10 Mg/2 Ml Inj)  10 mg IV Q6H PRN


   PRN Reason: Nausea And Vomiting


Morphine Sulfate (Morphine 2 Mg/1 Ml Inj)  2 mg IV Q4H PRN


   PRN Reason: Pain, Moderate (4-6)


Nystatin (Nystatin 500,000 Unit/5 Ml Oral Liqd)  500,000 unit PO QID [facility]


   Last Admin: 05/25/21 21:16 Dose:  Not Given


   Documented by: 


Ondansetron HCl (Ondansetron 4 Mg/2 Ml Inj)  4 mg IV Q8H PRN


   PRN Reason: Nausea And Vomiting


Sodium Chloride (Sodium Chloride 0.9% 10 Ml Flush Syringe)  10 ml IV BID [facility]


   Last Admin: 05/25/21 21:18 Dose:  10 ml


   Documented by: 


Sodium Chloride (Sodium Chloride 0.9% 10 Ml Flush Syringe)  10 ml IV PRN PRN


   PRN Reason: LINE FLUSH











Review of Systems


ROS unobtainable: due to mental status





Exam





- Constitutional


Vitals: 


                                        











Temp Pulse Resp BP Pulse Ox


 


 98.0 F   66   18   136/98   93 


 


 05/26/21 04:36  05/26/21 04:36  05/26/21 04:36  05/26/21 04:36  05/26/21 04:36











General appearance: Present: no acute distress





- EENT


Eyes: Present: PERRL, EOM intact


ENT: hearing intact





- Respiratory


Respiratory effort: normal


Respiratory: bilateral: CTA





- Cardiovascular


Rhythm: regular


Heart Sounds: Present: S1 & S2





- Extremities


Extremities: No edema





Results





- Labs


CBC & Chem 7: 


                                 05/25/21 05:34





                                 05/26/21 04:56


Labs: 


                              Abnormal lab results











  05/25/21 05/26/21 05/26/21 Range/Units





  19:48 04:56 04:56 


 


PT    15.3 H  (12.2-14.9)  Sec.


 


INR    1.23 H  (0.87-1.13)  


 


Potassium  3.4 L D  3.4 L   (3.6-5.0)  mmol/L


 


BUN   4 L   (7-17)  mg/dL


 


Creatinine   0.5 L   (0.6-1.2)  mg/dL


 


Glucose   104 H   ()  mg/dL


 


Calcium   8.2 L   (8.4-10.2)  mg/dL


 


Magnesium  1.60 L    (1.7-2.3)  mg/dL














Assessment and Plan


1.  Unsafe swallowing/oropharyngeal dysphagia -may be a result of concurrent 

infection exacerbating underlying stroke deficits.  This may resolve over time 

but this is unpredictable.  Discussed with patient's 2 daughters.  They wish to 

proceed with G-tube placement.


- PEG placement on 5/28

## 2021-05-26 NOTE — PROGRESS NOTE
Assessment and Plan


Assessment and plan: 


70-year-old -American female with a past medical history as below who 

presented with acute encephalopathy and sudden aphasia.  Patient is being 

treated for acute community-acquired pneumonia and work-up for altered mental 

status at this time.  Dysphagia, failed swallow evaluation, recommended PEG 

placement, GI evaluated possible PEG placement on 5/28/2021.  Meanwhile we will 

start Dobbhoff feeds today and tomorrow





Assessment and plan


--Dysphagia; failed swallow eval


Speech therapist recommended PEG placement


Family agreed, GI evaluated the patient


Possible PEG placement on 5/28/2021





--Nutrition; Dobbhoff placement.  Tube feeding per protocol


N.p.o. from midnight of 5/28/2021 for possible PEG





--Severe hypokalemia; K2.8


40 mEq KCl IV, at 20 mL KCl IV fluids


Check magnesium, follow electrolytes





--Acute metabolic encephalopathy;


Multifactorial, multiple strokes, multi-infarct dementia


Hypoxia, pneumonia, respiratory failure


IV fluids, supportive care


CVA work-up negative





Neuro work-up so far:


CT head without contrast no acute abnormality


MRI brain; no acute abnormality, chronic microvascular disease


CTA head; no evidence of large vessel occlusion involving intracranial vessel


CTA neck; mild calcification involving carotid bifurcations bilaterally without 

significant stenosis.  


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

artery which was also present in 2019





--Acute respiratory failure with hypoxia


Patient is requiring 3 L nasal cannula oxygen


Titrate O2 sats to more than 90%





--Pneumonia, community-acquired


IV ceftriaxone and IV azithromycin , follow cultures for now


Covid infection negative.





--Sepsis secondary to community-acquired pneumonia 


IV Rocephin and IV Zithromax


Follow cultures, oxygen titrate O2 sats to more than 90%





--Multi-infarct dementia 


CVA, old, cognitive deficits,Supportive care


Repeat CT of the head without any abnormalities of acute CVA


Neurology following





--Hypertension


Continue amlodipine


IV hydralazine with parameters





--Hyperlipidemia;Continue statins





--DVT prophylaxis


Hold heparin, for possible PEG tomorrow





CODE STATUS: Full





Speech and swallow evaluated the patient, failed swallow eval, recommend PEG 

placement


GI consult for possible PEG tomorrow





5/26/2021; GI evaluated the patient possible PEG placement on 5/28/2021


Dobbhoff placement, tube feeding per protocol, restrain the patient if needed

















History


Interval history: 


I have seen and examined the patient at the bedside today


Patient's chart and medications reviewed


Patient is noncommunicative not in acute distress


Vital signs noted








Hospitalist Physical





- Constitutional


Vitals: 


                                        











Temp Pulse Resp BP Pulse Ox


 


 98.0 F   66   18   136/98   93 


 


 05/26/21 04:36  05/26/21 04:36  05/26/21 04:36  05/26/21 04:36  05/26/21 04:36











General appearance: Present: no acute distress, well-nourished, other 

(Noncommunicative)





- EENT


Eyes: Present: PERRL, EOM intact





- Neck


Neck: Present: supple, normal ROM





- Respiratory


Respiratory effort: normal


Respiratory: bilateral: diminished, negative: rales, rhonchi, wheezing





- Cardiovascular


Rhythm: regular


Heart Sounds: Present: S1 & S2





- Extremities


Extremities: no ischemia, No edema





- Abdominal


General gastrointestinal: soft, non-tender, non-distended, normal bowel sounds





- Integumentary


Integumentary: Present: clear, warm





- Psychiatric


Psychiatric: other (Noncommunicative/dementia)





- Neurologic


Neurologic: moves all extremities





HEART Score





- HEART Score


Troponin: 


                                        











Troponin T  < 0.010 ng/mL (0.00-0.029)   05/21/21  Unknown














Results





- Labs


CBC & Chem 7: 


                                 05/25/21 05:34





                                 05/26/21 04:56


Labs: 


                             Laboratory Last Values











WBC  7.2 K/mm3 (4.5-11.0)   05/25/21  05:34    


 


RBC  3.87 M/mm3 (3.65-5.03)   05/25/21  05:34    


 


Hgb  11.9 gm/dl (10.1-14.3)   05/25/21  05:34    


 


Hct  35.0 % (30.3-42.9)   05/25/21  05:34    


 


MCV  91 fl (79-97)   05/25/21  05:34    


 


MCH  31 pg (28-32)   05/25/21  05:34    


 


MCHC  34 % (30-34)   05/25/21  05:34    


 


RDW  13.8 % (13.2-15.2)   05/25/21  05:34    


 


Plt Count  192 K/mm3 (140-440)   05/25/21  05:34    


 


Lymph % (Auto)  18.9 % (13.4-35.0)   05/23/21  05:11    


 


Mono % (Auto)  9.1 % (0.0-7.3)  H  05/23/21  05:11    


 


Eos % (Auto)  0.2 % (0.0-4.3)   05/23/21  05:11    


 


Baso % (Auto)  0.6 % (0.0-1.8)   05/23/21  05:11    


 


Lymph # (Auto)  1.9 K/mm3 (1.2-5.4)   05/23/21  05:11    


 


Mono # (Auto)  0.9 K/mm3 (0.0-0.8)  H  05/23/21  05:11    


 


Eos # (Auto)  0.0 K/mm3 (0.0-0.4)   05/23/21  05:11    


 


Baso # (Auto)  0.1 K/mm3 (0.0-0.1)   05/23/21  05:11    


 


Seg Neutrophils %  71.2 % (40.0-70.0)  H  05/23/21  05:11    


 


Seg Neutrophils #  7.2 K/mm3 (1.8-7.7)   05/23/21  05:11    


 


PT  15.3 Sec. (12.2-14.9)  H  05/26/21  04:56    


 


INR  1.23  (0.87-1.13)  H  05/26/21  04:56    


 


APTT  30.3 Sec. (24.2-36.6)   05/26/21  04:56    


 


D-Dimer  373.6 ng/mlDDU (0-234)  H  05/21/21  Unknown


 


Sodium  141 mmol/L (137-145)   05/26/21  04:56    


 


Potassium  3.4 mmol/L (3.6-5.0)  L  05/26/21  04:56    


 


Chloride  105.0 mmol/L ()   05/26/21  04:56    


 


Carbon Dioxide  27 mmol/L (22-30)   05/26/21  04:56    


 


Anion Gap  12 mmol/L  05/26/21  04:56    


 


BUN  4 mg/dL (7-17)  L  05/26/21  04:56    


 


Creatinine  0.5 mg/dL (0.6-1.2)  L  05/26/21  04:56    


 


Estimated GFR  > 60 ml/min  05/26/21  04:56    


 


BUN/Creatinine Ratio  8 %  05/26/21  04:56    


 


Glucose  104 mg/dL ()  H  05/26/21  04:56    


 


POC Glucose  115 mg/dL ()  H  05/22/21  21:04    


 


Hemoglobin A1c  5.5 % (4-6)   05/22/21  06:38    


 


Lactic Acid  0.70 mmol/L (0.7-2.0)   05/21/21  20:36    


 


Calcium  8.2 mg/dL (8.4-10.2)  L  05/26/21  04:56    


 


Magnesium  1.70 mg/dL (1.7-2.3)   05/26/21  04:56    


 


Ferritin  382.5 ng/mL (10.0-200.0)  H  05/21/21  Unknown


 


Total Bilirubin  0.70 mg/dL (0.1-1.2)   05/22/21  06:38    


 


AST  18 units/L (5-40)   05/22/21  06:38    


 


ALT  14 units/L (7-56)   05/22/21  06:38    


 


Alkaline Phosphatase  76 units/L ()   05/22/21  06:38    


 


Lactate Dehydrogenase  180 units/L ()   05/21/21  Unknown


 


Troponin T  < 0.010 ng/mL (0.00-0.029)   05/21/21  Unknown


 


C-Reactive Protein  0.00 mg/dL (0.00-1.30)   05/21/21  Unknown


 


NT-Pro-B Natriuret Pep  106.8 pg/mL (0-900)   05/21/21  Unknown


 


Total Protein  7.1 g/dL (6.3-8.2)   05/22/21  06:38    


 


Albumin  3.7 g/dL (3.9-5)  L  05/22/21  06:38    


 


Albumin/Globulin Ratio  1.1 %  05/22/21  06:38    


 


Procalcitonin  < 0.05 ng/mL (<0.15)   05/21/21  Unknown


 


Urine Color  Yellow  (Yellow)   05/22/21  Unknown


 


Urine Turbidity  Clear  (Clear)   05/22/21  Unknown


 


Urine pH  6.0  (5.0-7.0)   05/22/21  Unknown


 


Ur Specific Gravity  1.014  (1.003-1.030)   05/22/21  Unknown


 


Urine Protein  <15 mg/dl mg/dL (Negative)   05/22/21  Unknown


 


Urine Glucose (UA)  Neg mg/dL (Negative)   05/22/21  Unknown


 


Urine Ketones  Neg mg/dL (Negative)   05/22/21  Unknown


 


Urine Blood  Neg  (Negative)   05/22/21  Unknown


 


Urine Nitrite  Neg  (Negative)   05/22/21  Unknown


 


Urine Bilirubin  Neg  (Negative)   05/22/21  Unknown


 


Urine Urobilinogen  < 2.0 mg/dL (<2.0)   05/22/21  Unknown


 


Ur Leukocyte Esterase  Neg  (Negative)   05/22/21  Unknown


 


Urine WBC (Auto)  1.0 /HPF (0.0-6.0)   05/22/21  Unknown


 


Urine RBC (Auto)  < 1.0 /HPF (0.0-6.0)   05/22/21  Unknown


 


U Epithel Cells (Auto)  1.0 /HPF (0-13.0)   05/22/21  Unknown


 


Urine Mucus  Few /HPF  05/22/21  Unknown


 


Coronavirus (PCR)  Negative  (Negative)   05/22/21  Unknown











Microbiology: 


Microbiology





05/21/21 17:55   Peripheral/Venous   Blood Culture - Preliminary


                            NO GROWTH AFTER 4 DAYS


05/21/21 17:55   Peripheral/Venous   Blood Culture - Preliminary


                            Diphtheroids








Gonsalez/IV: 


                                        





Voiding Method                   External Female Catheter











Active Medications





- Current Medications


Current Medications: 














Generic Name Dose Route Start Last Admin





  Trade Name Freq  PRN Reason Stop Dose Admin


 


Acetaminophen  650 mg  05/21/21 22:19 





  Acetaminophen 325 Mg Tab  PO  





  Q4H PRN  





  Pain MILD(1-3)/Fever >100.5/HA  


 


Acetaminophen  325 mg  05/22/21 10:30  05/23/21 17:09





  Acetaminophen 325 Mg Rect Supp  NM   325 mg





  Q6H PRN   Administration





  Fever >101  


 


Amlodipine Besylate  5 mg  05/22/21 10:00  05/26/21 10:52





  Amlodipine 5 Mg Tab  PO   Not Given





  QDAY TOMMY  


 


Atorvastatin Calcium  80 mg  05/22/21 10:00  05/26/21 10:52





  Atorvastatin 40 Mg Tab  PO   Not Given





  DAILY TOMMY  


 


Famotidine  20 mg  05/21/21 23:00  05/26/21 10:58





  Famotidine 20 Mg/2 Ml Inj  IV   20 mg





  BID TOMMY   Administration


 


Hydralazine HCl  50 mg  05/21/21 23:00  05/26/21 10:52





  Hydralazine 25 Mg Tab  PO   Not Given





  Q8HR TOMMY  


 


Hydralazine HCl  10 mg  05/22/21 10:30 





  Hydralazine 20 Mg/1 Ml Inj  IV  





  Q4HR PRN  





  Blood Pressure  


 


Potassium Chloride/Dextrose/Sod Cl  20 meq in 1,000 mls @ 125 mls/hr  05/25/21 

10:00  05/26/21 03:27





  D5w/Ns W/Kcl 20meq  IV   125 mls/hr





  AS DIRECT TOMMY   Administration


 


Ceftriaxone Sodium  1 gm in 50 mls @ 100 mls/hr  05/25/21 15:00  05/26/21 10:59





  Rocephin/Ns 1 Gm/50 Ml  IV   100 mls/hr





  Q24HR TOMMY   Administration





  Protocol  


 


Azithromycin  500 mg in 250 mls @ 250 mls/hr  05/25/21 15:00  05/26/21 10:59





  Zithromax/Ns  IV   250 mls/hr





  Q24HR TOMMY   Administration


 


Metoclopramide HCl  10 mg  05/21/21 22:19 





  Metoclopramide 10 Mg/2 Ml Inj  IV  





  Q6H PRN  





  Nausea And Vomiting  


 


Morphine Sulfate  2 mg  05/21/21 22:19 





  Morphine 2 Mg/1 Ml Inj  IV  





  Q4H PRN  





  Pain, Moderate (4-6)  


 


Nystatin  500,000 unit  05/24/21 18:00  05/26/21 10:59





  Nystatin 500,000 Unit/5 Ml Oral Liqd  PO   Not Given





  QID TOMMY  


 


Ondansetron HCl  4 mg  05/21/21 22:19 





  Ondansetron 4 Mg/2 Ml Inj  IV  





  Q8H PRN  





  Nausea And Vomiting  


 


Sodium Chloride  10 ml  05/22/21 10:00  05/26/21 10:59





  Sodium Chloride 0.9% 10 Ml Flush Syringe  IV   10 ml





  BID TOMMY   Administration


 


Sodium Chloride  10 ml  05/21/21 22:19 





  Sodium Chloride 0.9% 10 Ml Flush Syringe  IV  





  PRN PRN  





  LINE FLUSH  














Nutrition/Malnutrition Assess





- Dietary Evaluation


Nutrition/Malnutrition Findings: 


                                        





Nutrition Notes                                            Start:  05/22/21 

10:14


Freq:                                                      Status: Active       




Protocol:                                                                       




 Document     05/26/21 09:53    (Rec: 05/26/21 09:54    [id number])


 Nutrition Notes


     Initial or Follow up                        Brief Note


     Current Diagnosis                           COPD,Sepsis,Hypertension,


                                                 Respiratory Failure,Stroke


     Other Pertinent Diagnosis                   Acute dysphagia,


                                                 hyperlipidemia, Dementia, pneu


                                                 ,


     Current Diet                                NPO


     Subjective/Other Information                FU for diet advancement. Pt


                                                 has GI consult and is waiting


                                                 for PEG.


 Nutrition Intervention


     Change Diet Order:                          Recommend TF


     Nutrition Support:                          Recommend Jevity 1.2 at 50 ml/


                                                 hr


                                                 Free water flush 80 mlq4h


     Kcal                                        1,440


     Protein (gm)                                67


     Fluid (mL)                                  969


     Goal #1                                     Diet advancement or initiation


                                                 of TF when medically feasible


                                                 to meet needs


     Follow-Up By:                               05/28/21


     Additional Comments                         F/U for TF consult.

## 2021-05-27 RX ADMIN — POTASSIUM CHLORIDE SCH MLS/HR: 10 INJECTION, SOLUTION INTRAVENOUS at 13:43

## 2021-05-27 RX ADMIN — NYSTATIN SCH UNIT: 100000 SUSPENSION ORAL at 13:46

## 2021-05-27 RX ADMIN — NYSTATIN SCH UNIT: 100000 SUSPENSION ORAL at 22:04

## 2021-05-27 RX ADMIN — NYSTATIN SCH UNIT: 100000 SUSPENSION ORAL at 17:45

## 2021-05-27 RX ADMIN — Medication SCH ML: at 22:04

## 2021-05-27 RX ADMIN — FAMOTIDINE SCH MG: 10 INJECTION, SOLUTION INTRAVENOUS at 10:45

## 2021-05-27 RX ADMIN — DEXTROSE, SODIUM CHLORIDE, AND POTASSIUM CHLORIDE SCH MLS/HR: 5; .9; .15 INJECTION INTRAVENOUS at 22:09

## 2021-05-27 RX ADMIN — POTASSIUM CHLORIDE SCH MLS/HR: 10 INJECTION, SOLUTION INTRAVENOUS at 15:00

## 2021-05-27 RX ADMIN — Medication SCH ML: at 10:00

## 2021-05-27 RX ADMIN — POTASSIUM CHLORIDE SCH MLS/HR: 10 INJECTION, SOLUTION INTRAVENOUS at 17:30

## 2021-05-27 RX ADMIN — NYSTATIN SCH UNIT: 100000 SUSPENSION ORAL at 10:46

## 2021-05-27 RX ADMIN — FAMOTIDINE SCH MG: 10 INJECTION, SOLUTION INTRAVENOUS at 22:04

## 2021-05-27 NOTE — EVENT NOTE
Date: 05/27/21


I called patient's daughter and discussed in detail patient's condition, failed 

swallow evaluation, GI consultation, and plans for PEG placement tomorrow


She had few questions and concerns, addressed all of them, initially the 

daughter was planning to postpone PEG procedure to next week to either Monday or

Tuesday, however later decided to go ahead with PEG tomorrow 5/28/2021,


Patient will be placed n.p.o. from midnight, PEG procedure tomorrow, confirmed 

with GI..


Plan of care reviewed with the patient's nurse, patient and family and case ma

nagement

## 2021-05-27 NOTE — PROGRESS NOTE
Assessment and Plan


Cultures:


5/21/2021 blood culture: 1 out of 4 bottles positive with growth of gram-

positive rods: Diptheroids


5/22/2021 urine culture: No growth





A/P:


70-year-old female with prior CVA, residual right-sided weakness, dementia, COPD

was admitted to the hospital on 5/21/2021 with fever and change in her mental 

status:





#Fever: likely secondary to right-sided pneumonia. Resolved. Completed 

antibiotic course. No leukocytosis.





#Acute encephalopathy: Neurology on board. Imaging negative for acute 

abnormality in the brain. Seemingly improved.





#GPR bacteremia: 1 out of 4 bottles positive for diptheroids, likely contaminant

.





Recs:


-completed antibiotics, doing well





ID will sign off. Please call with questions. 





Kiya Humphrey MD, FACP


[facility] ([facility]


O: [phone]


F: [phone]





Subjective


Date of service: 05/27/21


Principal diagnosis: weakness and lack of speech


Interval history: 





Afebrile. Sitting at the edge of the bed. Working with PT, but confused at 

baseline. 





Objective





- Exam


Narrative Exam: 





Physical Exam: 


Constitutional: Awake, alert, tracks, nonverbal


Head, Ears, Nose: Normocephalic, atraumatic. External ears, nose normal


Eyes: Conjunctivae/corneas clear. No icterus. No ptosis.


Neck: Supple, no meningeal signs


Cardiovascular: S1, S2 normal. 


Respiratory: Good air entry, clear to auscultation bilaterally


GI: Soft, non-tender; bowel sounds normal. No peritoneal signs


Musculoskeletal: No pedal edema, no cyanosis.


Skin: No rash or abscess


Hem/Lymphatic: No palpable cervical or supraclavicular nodes. No lymphangitis


Psych: no agitation


Neurological: Awake, alert, confused at baseline





- Constitutional


Vitals: 


                                   Vital Signs











Temp Pulse Resp BP Pulse Ox


 


 98.1 F   72   16   151/87   98 


 


 05/27/21 04:26  05/27/21 04:26  05/27/21 04:26  05/27/21 05:28  05/27/21 04:26








                           Temperature -Last 24 Hours











Temperature                    98.1 F


 


Temperature                    98.9 F


 


Temperature                    98.1 F

















- Labs


CBC & Chem 7: 


                                 05/25/21 05:34





                                 05/27/21 08:25


Labs: 


                              Abnormal lab results











  05/27/21 Range/Units





  08:25 


 


Potassium  3.5 L  (3.6-5.0)  mmol/L

## 2021-05-27 NOTE — ELECTROCARDIOGRAPH REPORT
[facility]

                                       

Test Date:    2021               Test Time:    02:24:59

Pat Name:     TAIWO LOWERY         Department:   

Patient ID:   [id number]          Room:         A353 1

Gender:       F                        Technician:   [email or web address]

:          1950               Requested By: AMY KOCHERLA

Order Number: [id number]              Reading MD:   Juan Fernandez

                                 Measurements

Intervals                              Axis          

Rate:         83                       P:            -15

MI:           157                      QRS:          -30

QRSD:         79                       T:            16

QT:           403                                    

QTc:          474                                    

                           Interpretive Statements

Sinus rhythm

Inferior infarct, old

Possible old anterior infarct

Compared to ECG 2021 19:29:01

No significant change

Electronically Signed On 2021 10:08:14 EDT by Juan Fernandez

## 2021-05-27 NOTE — PROGRESS NOTE
Assessment and Plan


Assessment and plan: 


70-year-old -American female with a past medical history as below who 

presented with acute encephalopathy and sudden aphasia.  Patient is being 

treated for acute community-acquired pneumonia and work-up for altered mental 

status at this time.  Dysphagia, failed swallow evaluation, recommended PEG 

placement, GI evaluated possible PEG placement on 5/28/2021.  Patient is 

receiving Dobbhoff feeds, placed n.p.o. from midnight, possible PEG placement 

tomorrow. Family agreed for the procedure.





Assessment and plan


--Dysphagia; failed swallow eval


Speech therapist recommended PEG placement


Family agreed, GI evaluated the patient


Possible PEG placement tomorrow on 5/28/2021


Continue Dobbhoff feeds, n.p.o. after midnight





--Nutrition; Dobbhoff placement.  Tube feeding per protocol


N.p.o. from midnight of 5/28/2021 for possible PEG





--Severe hypokalemia; K2.8


40 mEq KCl IV, at 20 mL KCl IV fluids


Check magnesium, follow electrolytes





--Acute metabolic encephalopathy;


Multifactorial, multiple strokes, multi-infarct dementia


Hypoxia, pneumonia, respiratory failure


IV fluids, supportive care


CVA work-up negative





Neuro work-up so far:


CT head without contrast no acute abnormality


MRI brain; no acute abnormality, chronic microvascular disease


CTA head; no evidence of large vessel occlusion involving intracranial vessel


CTA neck; mild calcification involving carotid bifurcations bilaterally without 

significant stenosis.  


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

artery which was also present in 2019





--Acute respiratory failure with hypoxia


Patient is requiring 3 L nasal cannula oxygen


Titrate O2 sats to more than 90%





--Pneumonia, community-acquired


IV ceftriaxone and IV azithromycin , follow cultures for now


Covid 19 test negative





--Sepsis secondary to community-acquired pneumonia 


IV Rocephin and IV Zithromax


Follow cultures, oxygen titrate O2 sats to more than 90%





--Multi-infarct dementia 


CVA, old, cognitive deficits,Supportive care


Repeat CT of the head without any abnormalities of acute CVA


Neurology following





--Hypertension


Continue amlodipine


IV hydralazine with parameters





--Hyperlipidemia;Continue statins





--DVT prophylaxis


Hold heparin, for possible PEG tomorrow





CODE STATUS: Full





Speech and swallow evaluated the patient, failed swallow eval, recommend PEG 

placement


GI consult for possible PEG tomorrow





5/26/2021; GI evaluated the patient possible PEG placement on 5/28/2021


Dobbhoff placement, tube feeding per protocol, restrain the patient if needed





5/27/2021; patient is receiving Dobbhoff feeds, placed n.p.o. from midnight


For possible PEG placement tomorrow followed by hospice


Discussed with patient's daughter in detail, agree with the PEG placement














History


Interval history: 


I have seen and examined the patient at the bedside this morning


Patient's chart and medications reviewed, patient is more alert and awake 

nonverbal


Receiving Dobbhoff feeds


No new events reported by nursing


Family has ultimately agreed for PEG placement tomorrow


We will place n.p.o. from midnight in preparation for the procedure


Vital signs noted





Hospitalist Physical





- Constitutional


Vitals: 


                                        











Temp Pulse Resp BP Pulse Ox


 


 98.1 F   72   16   151/87   98 


 


 05/27/21 04:26  05/27/21 04:26  05/27/21 04:26  05/27/21 05:28  05/27/21 04:26











General appearance: Present: no acute distress, well-nourished, other 

(Noncommunicative)





- EENT


Eyes: Present: PERRL, EOM intact





- Neck


Neck: Present: supple, normal ROM





- Respiratory


Respiratory effort: normal, labored


Respiratory: bilateral: diminished, rhonchi, negative: rales, wheezing





- Cardiovascular


Rhythm: regular


Heart Sounds: Present: S1 & S2





- Extremities


Extremities: no ischemia, No edema





- Abdominal


General gastrointestinal: soft, non-tender, non-distended, normal bowel sounds





- Integumentary


Integumentary: Present: clear, warm





- Psychiatric


Psychiatric: appropriate mood/affect, cooperative





- Neurologic


Neurologic: CNII-XII intact, moves all extremities





HEART Score





- HEART Score


Troponin: 


                                        











Troponin T  < 0.010 ng/mL (0.00-0.029)   05/21/21  Unknown














Results





- Labs


CBC & Chem 7: 


                                 05/25/21 05:34





                                 05/27/21 08:25


Labs: 


                             Laboratory Last Values











WBC  7.2 K/mm3 (4.5-11.0)   05/25/21  05:34    


 


RBC  3.87 M/mm3 (3.65-5.03)   05/25/21  05:34    


 


Hgb  11.9 gm/dl (10.1-14.3)   05/25/21  05:34    


 


Hct  35.0 % (30.3-42.9)   05/25/21  05:34    


 


MCV  91 fl (79-97)   05/25/21  05:34    


 


MCH  31 pg (28-32)   05/25/21  05:34    


 


MCHC  34 % (30-34)   05/25/21  05:34    


 


RDW  13.8 % (13.2-15.2)   05/25/21  05:34    


 


Plt Count  192 K/mm3 (140-440)   05/25/21  05:34    


 


Lymph % (Auto)  18.9 % (13.4-35.0)   05/23/21  05:11    


 


Mono % (Auto)  9.1 % (0.0-7.3)  H  05/23/21  05:11    


 


Eos % (Auto)  0.2 % (0.0-4.3)   05/23/21  05:11    


 


Baso % (Auto)  0.6 % (0.0-1.8)   05/23/21  05:11    


 


Lymph # (Auto)  1.9 K/mm3 (1.2-5.4)   05/23/21  05:11    


 


Mono # (Auto)  0.9 K/mm3 (0.0-0.8)  H  05/23/21  05:11    


 


Eos # (Auto)  0.0 K/mm3 (0.0-0.4)   05/23/21  05:11    


 


Baso # (Auto)  0.1 K/mm3 (0.0-0.1)   05/23/21  05:11    


 


Seg Neutrophils %  71.2 % (40.0-70.0)  H  05/23/21  05:11    


 


Seg Neutrophils #  7.2 K/mm3 (1.8-7.7)   05/23/21  05:11    


 


PT  15.3 Sec. (12.2-14.9)  H  05/26/21  04:56    


 


INR  1.23  (0.87-1.13)  H  05/26/21  04:56    


 


APTT  30.3 Sec. (24.2-36.6)   05/26/21  04:56    


 


D-Dimer  373.6 ng/mlDDU (0-234)  H  05/21/21  Unknown


 


Sodium  141 mmol/L (137-145)   05/26/21  04:56    


 


Potassium  3.4 mmol/L (3.6-5.0)  L  05/26/21  04:56    


 


Chloride  105.0 mmol/L ()   05/26/21  04:56    


 


Carbon Dioxide  27 mmol/L (22-30)   05/26/21  04:56    


 


Anion Gap  12 mmol/L  05/26/21  04:56    


 


BUN  4 mg/dL (7-17)  L  05/26/21  04:56    


 


Creatinine  0.5 mg/dL (0.6-1.2)  L  05/26/21  04:56    


 


Estimated GFR  > 60 ml/min  05/26/21  04:56    


 


BUN/Creatinine Ratio  8 %  05/26/21  04:56    


 


Glucose  104 mg/dL ()  H  05/26/21  04:56    


 


POC Glucose  115 mg/dL ()  H  05/22/21  21:04    


 


Hemoglobin A1c  5.5 % (4-6)   05/22/21  06:38    


 


Lactic Acid  0.70 mmol/L (0.7-2.0)   05/21/21  20:36    


 


Calcium  8.2 mg/dL (8.4-10.2)  L  05/26/21  04:56    


 


Magnesium  1.70 mg/dL (1.7-2.3)   05/26/21  04:56    


 


Ferritin  382.5 ng/mL (10.0-200.0)  H  05/21/21  Unknown


 


Total Bilirubin  0.70 mg/dL (0.1-1.2)   05/22/21  06:38    


 


AST  18 units/L (5-40)   05/22/21  06:38    


 


ALT  14 units/L (7-56)   05/22/21  06:38    


 


Alkaline Phosphatase  76 units/L ()   05/22/21  06:38    


 


Lactate Dehydrogenase  180 units/L ()   05/21/21  Unknown


 


Troponin T  < 0.010 ng/mL (0.00-0.029)   05/21/21  Unknown


 


C-Reactive Protein  0.00 mg/dL (0.00-1.30)   05/21/21  Unknown


 


NT-Pro-B Natriuret Pep  106.8 pg/mL (0-900)   05/21/21  Unknown


 


Total Protein  7.1 g/dL (6.3-8.2)   05/22/21  06:38    


 


Albumin  3.7 g/dL (3.9-5)  L  05/22/21  06:38    


 


Albumin/Globulin Ratio  1.1 %  05/22/21  06:38    


 


Procalcitonin  < 0.05 ng/mL (<0.15)   05/21/21  Unknown


 


Urine Color  Yellow  (Yellow)   05/22/21  Unknown


 


Urine Turbidity  Clear  (Clear)   05/22/21  Unknown


 


Urine pH  6.0  (5.0-7.0)   05/22/21  Unknown


 


Ur Specific Gravity  1.014  (1.003-1.030)   05/22/21  Unknown


 


Urine Protein  <15 mg/dl mg/dL (Negative)   05/22/21  Unknown


 


Urine Glucose (UA)  Neg mg/dL (Negative)   05/22/21  Unknown


 


Urine Ketones  Neg mg/dL (Negative)   05/22/21  Unknown


 


Urine Blood  Neg  (Negative)   05/22/21  Unknown


 


Urine Nitrite  Neg  (Negative)   05/22/21  Unknown


 


Urine Bilirubin  Neg  (Negative)   05/22/21  Unknown


 


Urine Urobilinogen  < 2.0 mg/dL (<2.0)   05/22/21  Unknown


 


Ur Leukocyte Esterase  Neg  (Negative)   05/22/21  Unknown


 


Urine WBC (Auto)  1.0 /HPF (0.0-6.0)   05/22/21  Unknown


 


Urine RBC (Auto)  < 1.0 /HPF (0.0-6.0)   05/22/21  Unknown


 


U Epithel Cells (Auto)  1.0 /HPF (0-13.0)   05/22/21  Unknown


 


Urine Mucus  Few /HPF  05/22/21  Unknown


 


Coronavirus (PCR)  Negative  (Negative)   05/22/21  Unknown











Microbiology: 


Microbiology





05/21/21 17:55   Peripheral/Venous   Blood Culture - Final


                            NO GROWTH AFTER 5 DAYS








Gonsalez/IV: 


                                        





Voiding Method                   External Female Catheter











Active Medications





- Current Medications


Current Medications: 














Generic Name Dose Route Start Last Admin





  Trade Name Freq  PRN Reason Stop Dose Admin


 


Acetaminophen  650 mg  05/21/21 22:19 





  Acetaminophen 325 Mg Tab  PO  





  Q4H PRN  





  Pain MILD(1-3)/Fever >100.5/HA  


 


Acetaminophen  325 mg  05/22/21 10:30  05/23/21 17:09





  Acetaminophen 325 Mg Rect Supp  DE   325 mg





  Q6H PRN   Administration





  Fever >101  


 


Amlodipine Besylate  5 mg  05/22/21 10:00  05/26/21 10:52





  Amlodipine 5 Mg Tab  PO   Not Given





  QDAY TOMMY  


 


Lipase/Protease/Amylase  1 each  05/26/21 12:01 





  Lipase 10,500/Protease 25,000/Amylase 43,750 (Units) Dr Blaine ROQUETUBE  





  PRN PRN  





  For Clogged Feeding Tube  


 


Atorvastatin Calcium  80 mg  05/22/21 10:00  05/26/21 10:52





  Atorvastatin 40 Mg Tab  PO   Not Given





  DAILY TOMMY  


 


Famotidine  20 mg  05/21/21 23:00  05/26/21 21:40





  Famotidine 20 Mg/2 Ml Inj  IV   20 mg





  BID TOMMY   Administration


 


Hydralazine HCl  50 mg  05/21/21 23:00  05/27/21 05:28





  Hydralazine 25 Mg Tab  PO   50 mg





  Q8HR TOMMY   Administration


 


Hydralazine HCl  10 mg  05/22/21 10:30 





  Hydralazine 20 Mg/1 Ml Inj  IV  





  Q4HR PRN  





  Blood Pressure  


 


Potassium Chloride/Dextrose/Sod Cl  20 meq in 1,000 mls @ 125 mls/hr  05/25/21 

10:00  05/26/21 23:39





  D5w/Ns W/Kcl 20meq  IV   125 mls/hr





  AS DIRECT TOMMY   Administration


 


Ceftriaxone Sodium  1 gm in 50 mls @ 100 mls/hr  05/25/21 15:00  05/26/21 10:59





  Rocephin/Ns 1 Gm/50 Ml  IV   100 mls/hr





  Q24HR TOMMY   Administration





  Protocol  


 


Azithromycin  500 mg in 250 mls @ 250 mls/hr  05/25/21 15:00  05/26/21 10:59





  Zithromax/Ns  IV   250 mls/hr





  Q24HR TOMMY   Administration


 


Metoclopramide HCl  10 mg  05/21/21 22:19 





  Metoclopramide 10 Mg/2 Ml Inj  IV  





  Q6H PRN  





  Nausea And Vomiting  


 


Morphine Sulfate  2 mg  05/21/21 22:19 





  Morphine 2 Mg/1 Ml Inj  IV  





  Q4H PRN  





  Pain, Moderate (4-6)  


 


Nystatin  500,000 unit  05/24/21 18:00  05/26/21 21:49





  Nystatin 500,000 Unit/5 Ml Oral Liqd  PO   Not Given





  QID TOMMY  


 


Ondansetron HCl  4 mg  05/21/21 22:19 





  Ondansetron 4 Mg/2 Ml Inj  IV  





  Q8H PRN  





  Nausea And Vomiting  


 


Simple Syrup  15 ml  05/26/21 12:01 





  Simple Syrup 15 Ml  FEEDTUBE  





  PRN PRN  





  Hypoglycemia  


 


Simple Syrup  30 ml  05/26/21 12:01 





  Simple Syrup 15 Ml  FEEDTUBE  





  PRN PRN  





  Hypoglycemia  


 


Sodium Bicarbonate  325 mg  05/26/21 12:01 





  Sodium Bicarbonate 325 Mg Tab  FEEDTUBE  





  PRN PRN  





  For Clogged Feeding Tube  


 


Sodium Chloride  10 ml  05/22/21 10:00  05/26/21 21:40





  Sodium Chloride 0.9% 10 Ml Flush Syringe  IV   10 ml





  BID TOMMY   Administration


 


Sodium Chloride  10 ml  05/21/21 22:19 





  Sodium Chloride 0.9% 10 Ml Flush Syringe  IV  





  PRN PRN  





  LINE FLUSH  














Nutrition/Malnutrition Assess





- Dietary Evaluation


Nutrition/Malnutrition Findings: 


                                        





Nutrition Notes                                            Start:  05/22/21 

10:14


Freq:                                                      Status: Active       




Protocol:                                                                       




 Document     05/26/21 09:53    (Rec: 05/26/21 09:54    [id number])


 Nutrition Notes


     Need for Assessment generated from:         MD Order


     Initial or Follow up                        Reassessment


     Current Diagnosis                           COPD,Sepsis,Hypertension,


                                                 Respiratory Failure,Stroke


     Other Pertinent Diagnosis                   Acute dysphagia,


                                                 hyperlipidemia, Dementia, pneu


                                                 ,


     Current Diet                                NPO


     Labs/Tests                                  K 3.4


     Pertinent Medications                       D5NS w 20mEq Kcl at 125 ml/hr


     Height                                      5 ft 5 in


     Weight                                      66.2 kg


     Ideal Body Weight (kg)                      56.81


     BMI                                         24.3


     Weight Status                               Appropriate


     Subjective/Other Information                MD order for TF. Pt will get


                                                 PEG on 5/28.


     Percent of energy/protein needs met:        0%/0%


     Burn                                        Absent


     Trauma                                      Absent


     Difficulty In                               Swallowing


     Current % PO                                Negligible


     Minimum of two criteria                     No


     #2


      Nutrition Diagnosis                        Inadequate oral intake


      Diagnosis Progress(for reassessment        Continues


       documentation)                            


     #1


      Nutrition Diagnosis                        Inadequate energy intake


      Diagnosis Progress(for reassessment        Continues


       documentation)                            


     Is patient on ventilator?                   No


     Is Patient Ambulatory and/or Out of Bed     No


     REE-([facility]-confined to bed)      [phone]


     Calculation Used for Recommendations        [facility]


     Additional Notes                            Protein needs are 72-86g (1-1.


                                                 2g/kg)


                                                 Fluid needs are 1ml/kcal


 Nutrition Intervention


     Change Diet Order:                          Recommend TF


     Nutrition Support:                          Recommend Jevity 1.2 at 50 ml/


                                                 hr


                                                 Free water flush 80 mlq4h


     Kcal                                        1,440


     Protein (gm)                                67


     Fluid (mL)                                  969


     Goal #1                                     Meet at least 75% of protein


                                                 and energy needs via TF


     Anticipated Discharge Needs:                Unable to determine at this


                                                 time.


     Follow-Up By:                               05/28/21


     Additional Comments                         FU for TF start and tolerance

## 2021-05-28 LAB
ALBUMIN SERPL-MCNC: 3.1 G/DL (ref 3.9–5)
ALT SERPL-CCNC: 10 UNITS/L (ref 7–56)
BASOPHILS # (AUTO): 0.1 K/MM3 (ref 0–0.1)
BASOPHILS NFR BLD AUTO: 0.7 % (ref 0–1.8)
BUN SERPL-MCNC: 5 MG/DL (ref 7–17)
BUN/CREAT SERPL: 10 %
CALCIUM SERPL-MCNC: 8.7 MG/DL (ref 8.4–10.2)
EOSINOPHIL # BLD AUTO: 0.6 K/MM3 (ref 0–0.4)
EOSINOPHIL NFR BLD AUTO: 7.3 % (ref 0–4.3)
HCT VFR BLD CALC: 36 % (ref 30.3–42.9)
HEMOLYSIS INDEX: 3
HGB BLD-MCNC: 12.3 GM/DL (ref 10.1–14.3)
LYMPHOCYTES # BLD AUTO: 2.1 K/MM3 (ref 1.2–5.4)
LYMPHOCYTES NFR BLD AUTO: 24.6 % (ref 13.4–35)
MCHC RBC AUTO-ENTMCNC: 34 % (ref 30–34)
MCV RBC AUTO: 89 FL (ref 79–97)
MONOCYTES # (AUTO): 0.7 K/MM3 (ref 0–0.8)
MONOCYTES % (AUTO): 8.7 % (ref 0–7.3)
PLATELET # BLD: 223 K/MM3 (ref 140–440)
RBC # BLD AUTO: 4.04 M/MM3 (ref 3.65–5.03)

## 2021-05-28 PROCEDURE — 0DH63UZ INSERTION OF FEEDING DEVICE INTO STOMACH, PERCUTANEOUS APPROACH: ICD-10-PCS | Performed by: INTERNAL MEDICINE

## 2021-05-28 RX ADMIN — DEXTROSE, SODIUM CHLORIDE, AND POTASSIUM CHLORIDE SCH MLS/HR: 5; .9; .15 INJECTION INTRAVENOUS at 18:20

## 2021-05-28 RX ADMIN — FAMOTIDINE SCH: 10 INJECTION, SOLUTION INTRAVENOUS at 13:09

## 2021-05-28 RX ADMIN — DEXTROSE, SODIUM CHLORIDE, AND POTASSIUM CHLORIDE SCH MLS/HR: 5; .9; .15 INJECTION INTRAVENOUS at 07:50

## 2021-05-28 RX ADMIN — NYSTATIN SCH: 100000 SUSPENSION ORAL at 17:24

## 2021-05-28 RX ADMIN — FAMOTIDINE SCH MG: 10 INJECTION, SOLUTION INTRAVENOUS at 22:01

## 2021-05-28 RX ADMIN — Medication SCH ML: at 18:21

## 2021-05-28 RX ADMIN — NYSTATIN SCH: 100000 SUSPENSION ORAL at 18:41

## 2021-05-28 RX ADMIN — Medication SCH ML: at 22:00

## 2021-05-28 RX ADMIN — NYSTATIN SCH: 100000 SUSPENSION ORAL at 13:09

## 2021-05-28 NOTE — OPERATIVE REPORT
DATE OF SURGERY: 05/28/2021



PROCEDURE:  Upper endoscopy with G-tube placement.



PREOPERATIVE DIAGNOSIS:  Oropharyngeal dysphagia.



POSTOPERATIVE DIAGNOSES:  Successful gastrostomy tube placement, a medium size 

hiatal hernia.



SEDATION:  MAC by anesthesia.



HISTORY:  The patient is a 70-year-old woman with a history of right-sided 

cerebrovascular accident and mild dementia, who has right-sided pneumonia.  She 

has oropharyngeal dysphagia and safe swallowing has not been demonstrated by 

speech therapy.



DESCRIPTION OF PROCEDURE:  Procedure, indications, risks, and benefits were 

explained to the patient's daughter and consent was obtained.  The patient was 

placed back on exam table and sedated.  Videoendoscope was passed through the 

mouth, oropharynx, into the descending duodenum.  Scope was then gradually 

withdrawn _____ in the mucosa into the gastric lumen. This was insufflated and 

an appropriate location was located in the epigastric surface by 

transillumination and 1:1 ballottement.  This was prepped and draped in a 

sterile fashion.  3 mL of Xylocaine were infiltrated into the skin using the 

safe-tract technique.  Trocar was then placed percutaneously into the gastric 

lumen and a guidewire was passed through this.  This was grasped with a snare 

and pulled out the mouth.  A 20-Belizean G-tube was attached to the wire at the 

mouth and pulled through the mouth and oropharynx into the gastric lumen and out

the skin.  Placement was confirmed by repeat endoscopy.



FINDINGS:

1.  Normal esophagus.

2.  Medium size hiatal hernia.

3.  Normal appearing gastric antrum, fundus, body and cardia.

4.  Normal-appearing duodenal bulb and duodenum.

5.  Successful 20-Belizean G-tube placement with bumper identified in appropriate 

location.



The patient had no immediate complications.  Estimated blood loss was minimal.



IMPRESSION:

1.  Medium size hiatal hernia.

2.  Otherwise, normal endoscopy.

3.  Successful gastrostomy tube placement.



PLAN:

1.  Monitor for complications.

2.  May resume tube feeding in 4 hours.







DD: 05/28/2021 12:18 PM

DT: 05/28/2021 12:47 PM

TID: [id number] RECEIPT: [id number]

C/PRE/KAY

## 2021-05-28 NOTE — POST OPERATIVE NOTE
Pre-op diagnosis: Oropharyngeal dysphagia


Post-op diagnosis: other (Hiatal hernia, successful G-tube placement)


Findings: 


1.  Medium sized hiatal hernia


2.  Successful G-tube placement


3.  Otherwise normal EGD





Procedure: 


EGD/PEG





Anesthesia: MAC


Surgeon: MARK MARQUEZ


Estimated blood loss: minimal


Pathology: none


Condition: stable


Disposition: floor (May use G-tube after 4 hours)

## 2021-05-28 NOTE — ANESTHESIA CONSULTATION
Anesthesia Consult and Med Hx


Date of service: 05/28/21





- Airway


Intubation Access Assessment: Possibly Difficult





- Pre-Operative Health Status


ASA Pre-Surgery Classification: ASA3


Proposed Anesthetic Plan: MAC





- Pulmonary


Hx Respiratory Symptoms: Yes (intermittently on NC this admission, COVID neg)


Hx Pneumonia: Yes (completed treatment for community acquired PNA this admissio

n)





- Cardiovascular System


Hx Hypertension: Yes





- Central Nervous System


CVA: Yes (encephalopathy)





- Gastrointestinal


Hx Gastroesophageal Reflux Disease: No (dysphagia)





- Endocrine


Hx Renal Disease: No


Hx Liver Disease: No


Hx Insulin Dependent Diabetes: No


Hx Non-Insulin Dependent Diabetes: No


Hx Thyroid Disease: No





- Other Systems


Hx Obesity: No





- Additional Comments


Anesthesia Medical History Comments: Consent obtained from daughter Latoya Carlton.

## 2021-05-28 NOTE — POST ANESTHESIA EVALUATION
- Post Anesthesia Evaluation


Patient Participated: No


Airway Patent: Yes


Stable Respiratory Function: Yes


Nausea/Vomiting: No


Temp > 96.8F: Yes


Pain Manageable: Yes


Adequeate Hydration: Yes


Anesthesia Complications: No

## 2021-05-29 ENCOUNTER — OUT OF OFFICE VISIT (OUTPATIENT)
Dept: URBAN - METROPOLITAN AREA MEDICAL CENTER 41 | Facility: MEDICAL CENTER | Age: 71
End: 2021-05-29
Payer: MEDICARE

## 2021-05-29 VITALS — SYSTOLIC BLOOD PRESSURE: 155 MMHG | DIASTOLIC BLOOD PRESSURE: 86 MMHG

## 2021-05-29 DIAGNOSIS — R13.12 DYSPHAGIA, OROPHARYNGEAL: ICD-10-CM

## 2021-05-29 LAB
HCT VFR BLD CALC: 36.2 % (ref 30.3–42.9)
HGB BLD-MCNC: 12 GM/DL (ref 10.1–14.3)

## 2021-05-29 PROCEDURE — 99232 SBSQ HOSP IP/OBS MODERATE 35: CPT | Performed by: INTERNAL MEDICINE

## 2021-05-29 RX ADMIN — ACETAMINOPHEN PRN MG: 325 TABLET ORAL at 00:00

## 2021-05-29 RX ADMIN — FAMOTIDINE SCH MG: 10 INJECTION, SOLUTION INTRAVENOUS at 12:54

## 2021-05-29 RX ADMIN — NYSTATIN SCH: 100000 SUSPENSION ORAL at 10:55

## 2021-05-29 RX ADMIN — NYSTATIN SCH: 100000 SUSPENSION ORAL at 01:02

## 2021-05-29 RX ADMIN — NYSTATIN SCH: 100000 SUSPENSION ORAL at 18:55

## 2021-05-29 RX ADMIN — NYSTATIN SCH: 100000 SUSPENSION ORAL at 14:55

## 2021-05-29 RX ADMIN — Medication SCH ML: at 18:53

## 2021-05-29 NOTE — DISCHARGE SUMMARY
Providers





- Providers


Date of Admission: 


05/21/21 19:37





Date of discharge: 05/29/21


Attending physician: 


AMY J KOCHERLA





                                        





05/22/21 06:28


Speech Therapy Evaluation and Treat [CONS] Urgent 


   Reason For Exam: swallowing difficulty





05/24/21 07:28


Consult to Physician [CONS] Routine 


   Comment: 


   Consulting Provider: BENI CHÁVEZ


   Physician Instructions: 


   Reason For Exam: multiple fevers/encephalopathy





05/24/21 07:30


Consult to Physician [CONS] Routine 


   Comment: 


   Consulting Provider: DULCE AGUILAR


   Physician Instructions: 


   Reason For Exam: acute encephalopathy/aphasia





05/25/21 17:41


Consult to Physician [CONS] Routine 


   Comment: 


   Consulting Provider: MARK MARQUEZ


   Physician Instructions: 


   Reason For Exam: Dysphagia/failed swallow/for PEG placement





05/26/21 12:01


Consult to Dietitian/Nutrition [CONS] Routine 


   Physician Instructions: Assess nutrtn needs, initiate, modify, manage TF


   Reason For Exam: 


   Reason for Consult: Write/Manage Tube Feeding


   Reason for Consult: Write/Manage Tube Feeding





05/26/21 17:41


Physical Therapy Evaluation and Treat [CONS] Routine 


   Comment: 


   Reason For Exam: weakness





05/28/21 16:24


Consult to Dietitian/Nutrition [CONS] Routine 


   Physician Instructions: Assess nutrtn needs, initiate, modify, manage TF


   Reason For Exam: 


   Reason for Consult: Write/Manage Tube Feeding


   Reason for Consult: Write/Manage Tube Feeding











Primary care physician: 


CLAUDIA BELLO MD








Hospitalization


Reason for admission: Aphasia and metabolic encephalopathy


Condition: Stable


Pertinent studies: 


Neuro work-up so far:


CT head without contrast no acute abnormality


MRI brain; no acute abnormality, chronic microvascular disease


CTA head; no evidence of large vessel occlusion involving intracranial vessel


CTA neck; mild calcification involving carotid bifurcations bilaterally without 

significant stenosis.  


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

 artery which was also present in 2019








Procedures: 


EGD and PEG placement





Hospital course: 


70-year-old -American female with a past medical history as below who 

presented with acute encephalopathy and sudden aphasia.  Patient is being 

treated for acute community-acquired pneumonia and work-up for altered mental 

status at this time.  Dysphagia, failed swallow evaluation, recommended PEG 

placement, GI evaluated possible PEG placement on 5/28/2021.  Patient is 

receiving Dobbhoff feeds, placed n.p.o. from midnight, possible PEG placement 

tomorrow. Family agreed for the procedure.  Verbal consent obtained today 

5/28/2021 for PEG procedure, patient underwent PEG placement today per GI.





Assessment and plan


--Dysphagia; failed swallow eval


Speech therapist recommended PEG placement


Family agreed, GI evaluated the patient


Verbal consent obtained ,PEG was placed today 5/28/2021


Tube feeding per protocol as recommended by GI





--Nutrition; Dobbhoff placement.  Tube feeding per protocol


N.p.o. from midnight of 5/28/2021 for possible PEG





--Severe hypokalemia; K2.8


40 mEq KCl IV, at 20 mL KCl IV fluids


Check magnesium, follow electrolytes





--Acute metabolic encephalopathy;


Multifactorial, multiple strokes, multi-infarct dementia


Hypoxia, pneumonia, respiratory failure


IV fluids, supportive care


CVA work-up negative








--Acute respiratory failure with hypoxia


Patient is requiring 3 L nasal cannula oxygen


Titrate O2 sats to more than 90%





--Pneumonia, community-acquired


IV ceftriaxone and IV azithromycin , follow cultures for now


Covid 19 test negative





--Sepsis secondary to community-acquired pneumonia 


IV Rocephin and IV Zithromax


Follow cultures, oxygen titrate O2 sats to more than 90%





--Multi-infarct dementia 


CVA, old, cognitive deficits,Supportive care


Repeat CT of the head without any abnormalities of acute CVA


Status post PEG placement, on PEG feeds





--Hypertension


Continue amlodipine


IV hydralazine with parameters





--Hyperlipidemia;Continue statins





--DVT prophylaxis


Hold heparin, for possible PEG tomorrow





CODE STATUS: Full





Speech and swallow evaluated the patient, failed swallow eval, recommend PEG 

placement


GI evaluated, patient underwent PEG placement today, followed by tube feeding


DC planning per case management possible hospice placement





I also discussed the case with case management


I spent 42 minutes coordinating this patient seen and examined medical records 

reviewed


Patient feels slightly better continues to have shortness of breath


Denies chest pain


Box box box open box


Disposition: DC-50 TO HOSPICE (HOME)


Final Discharge Diagnosis (Prints w/discharge instructions): Dysphagia.  s/p PEG

 placement.  Acute metabolic encephalopathy.  Hypokalemia.  Acute respiratory 

failure with hypoxia.  Sepsis secondary to community-acquired pneumonia.  Multi-

infarct dementia.  Hypertension.  Dyslipidemia


Time spent for discharge: 35 min





Core Measure Documentation





- Palliative Care


Palliative Care/ Comfort Measures: Hospice Care





- Core Measures


Any of the following diagnoses?: none





Exam





- Constitutional


Vitals: 


                                        











Temp Pulse Resp BP Pulse Ox


 


 98.8 F   102 H  16   128/72   93 


 


 05/29/21 04:05  05/29/21 06:07  05/29/21 04:05  05/29/21 06:07  05/28/21 21:47











General appearance: Present: no acute distress, well-nourished





- EENT


Eyes: Present: PERRL, EOM intact





- Neck


Neck: Present: supple, normal ROM





- Respiratory


Respiratory effort: normal


Respiratory: negative: rales





- Cardiovascular


Rhythm: regular


Heart Sounds: Present: S1 & S2





- Extremities


Extremities: no ischemia, No edema





- Abdominal


General gastrointestinal: Present: soft, non-tender, non-distended, normal bowel

 sounds





- Integumentary


Integumentary: Present: clear, warm





- Musculoskeletal


Musculoskeletal: strength equal bilaterally, generalized weakness





- Psychiatric


Psychiatric: appropriate mood/affect, cooperative





- Neurologic


Neurologic: moves all extremities





Plan


Activity: advance as tolerated, fall precautions


Diet: other (PEG tube feeding per protocol )


Additional Instructions: PEG care.  PEG tube spelt protocols.  Aspiration 

precautions.  Fall precautions.  Further management per medical director of 

hospice


Follow up with: 


CLAUDIA BELLO MD [Primary Care Provider] - 3-5 Days


MARK MARQUEZ MD [Staff Physician] - 6 Weeks


Prescriptions: 


Nystatin [Nystatin SUSP] 500,000 unit [place]ID #[id number] [facility] [place]

## 2021-05-29 NOTE — GASTROENTEROLOGY PROGRESS NOTE
Assessment and Plan





oropharyngeal dysphagia - s/p egd/peg, cont tube feeds as tolerated per 

nutrition recommendations.  post peg care daily.  will sign off, please call as 

needed.





Subjective


Date of service: 05/29/21


Principal diagnosis: weakness and lack of speech


Interval history: 





s/p egd/peg yesterday, on tube feeds, no events overnight





Objective





- Exam


Narrative Exam: 





Gen: nad, non-verbal


abd: soft, nd, + peg tube site c/d/i, external bumper loosened at bedside





- Constitutional


Vitals: 


                                        











Temp Pulse Resp BP Pulse Ox


 


 98.8 F   102 H  16   128/72   93 


 


 05/29/21 04:05  05/29/21 06:07  05/29/21 04:05  05/29/21 06:07  05/28/21 21:47














- Labs


CBC & Chem 7: 


                                 05/29/21 04:50





                                 05/28/21 00:09


Labs: 


                         Laboratory Results - last 24 hr











  05/28/21 05/29/21 05/29/21





  16:17 00:44 04:50


 


Hgb    12.0


 


Hct    36.2


 


POC Glucose  120 H  162 H 














  05/29/21





  06:40


 


Hgb 


 


Hct 


 


POC Glucose  134 H

## 2022-03-22 NOTE — PROGRESS NOTE
Assessment and Plan


Assessment and plan: 


70-year-old -American female with a past medical history as below who 

presented with acute encephalopathy and sudden aphasia.  Patient is being 

treated for acute community-acquired pneumonia and work-up for altered mental 

status at this time.  Dysphagia, failed swallow evaluation, recommended PEG 

placement, GI evaluated possible PEG placement on 5/28/2021.  Patient is 

receiving Dobbhoff feeds, placed n.p.o. from midnight, possible PEG placement 

tomorrow. Family agreed for the procedure.  Verbal consent obtained today 

5/28/2021 for PEG procedure, patient underwent PEG placement today per GI.





Assessment and plan


--Dysphagia; failed swallow eval


Speech therapist recommended PEG placement


Family agreed, GI evaluated the patient


Verbal consent obtained ,PEG was placed today 5/28/2021


Tube feeding per protocol as recommended by GI





--Nutrition; Dobbhoff placement.  Tube feeding per protocol


N.p.o. from midnight of 5/28/2021 for possible PEG





--Severe hypokalemia; K2.8


40 mEq KCl IV, at 20 mL KCl IV fluids


Check magnesium, follow electrolytes





--Acute metabolic encephalopathy;


Multifactorial, multiple strokes, multi-infarct dementia


Hypoxia, pneumonia, respiratory failure


IV fluids, supportive care


CVA work-up negative





Neuro work-up so far:


CT head without contrast no acute abnormality


MRI brain; no acute abnormality, chronic microvascular disease


CTA head; no evidence of large vessel occlusion involving intracranial vessel


CTA neck; mild calcification involving carotid bifurcations bilaterally without 

significant stenosis.  


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

artery which was also present in 2019





--Acute respiratory failure with hypoxia


Patient is requiring 3 L nasal cannula oxygen


Titrate O2 sats to more than 90%





--Pneumonia, community-acquired


IV ceftriaxone and IV azithromycin , follow cultures for now


Covid 19 test negative





--Sepsis secondary to community-acquired pneumonia 


IV Rocephin and IV Zithromax


Follow cultures, oxygen titrate O2 sats to more than 90%





--Multi-infarct dementia 


CVA, old, cognitive deficits,Supportive care


Repeat CT of the head without any abnormalities of acute CVA


Status post PEG placement, on PEG feeds





--Hypertension


Continue amlodipine


IV hydralazine with parameters





--Hyperlipidemia;Continue statins





--DVT prophylaxis


Hold heparin, for possible PEG tomorrow





CODE STATUS: Full





Speech and swallow evaluated the patient, failed swallow eval, recommend PEG 

placement


GI evaluated, patient underwent PEG placement today, followed by tube feeding


DC planning per case management possible hospice placement





I also discussed the case with case management


I spent 42 minutes coordinating this patient seen and examined medical records 

reviewed


Patient feels slightly better continues to have shortness of breath


Denies chest pain








5/26/2021; GI evaluated the patient possible PEG placement on 5/28/2021


Dobbhoff placement, tube feeding per protocol, restrain the patient if needed





5/27/2021; patient is receiving Dobbhoff feeds, placed n.p.o. from midnight


For possible PEG placement tomorrow followed by hospice


Discussed with patient's daughter in detail, agree with the PEG placement





5/28/2021; PEG placement today, verbal consent obtained from both the daughters 

by phone


Patient had PEG placement today, start tube feeding per protocols


Possible discharge tomorrow to hospice














History


Interval history: 


In and examined the patient at the bedside


Patient's chart and medications reviewed


Patient is scheduled for PEG placement


I discussed with both the sisters and took a verbal consult and informed Dr. Mendez


Scheduled for PEG placement


Vital signs stable








Hospitalist Physical





- Constitutional


Vitals: 


                                        











Temp Pulse Resp BP Pulse Ox


 


 98.1 F   106 H  20   148/111   95 


 


 05/28/21 05:16  05/28/21 05:16  05/28/21 05:16  05/28/21 05:30  05/28/21 05:16











General appearance: Present: no acute distress, well-nourished, other 

(Noncommunicative)





- EENT


Eyes: Present: PERRL, EOM intact





- Neck


Neck: Present: supple, normal ROM





- Respiratory


Respiratory effort: normal


Respiratory: bilateral: diminished, negative: rales, rhonchi, wheezing





- Cardiovascular


Rhythm: regular


Heart Sounds: Present: S1 & S2





- Extremities


Extremities: no ischemia, No edema





- Abdominal


General gastrointestinal: soft, non-tender, non-distended, normal bowel sounds, 

other (PEG tube in place)





- Integumentary


Integumentary: Present: clear, warm





- Psychiatric


Psychiatric: appropriate mood/affect, cooperative, other (Noncommunicative 

confused)





- Neurologic


Neurologic: moves all extremities (Residual weakness)





HEART Score





- HEART Score


Troponin: 


                                        











Troponin T  < 0.010 ng/mL (0.00-0.029)   05/21/21  Unknown














Results





- Labs


CBC & Chem 7: 


                                 05/28/21 00:09





                                 05/28/21 00:09


Labs: 


                             Laboratory Last Values











WBC  8.5 K/mm3 (4.5-11.0)   05/28/21  00:09    


 


RBC  4.04 M/mm3 (3.65-5.03)   05/28/21  00:09    


 


Hgb  12.3 gm/dl (10.1-14.3)   05/28/21  00:09    


 


Hct  36.0 % (30.3-42.9)   05/28/21  00:09    


 


MCV  89 fl (79-97)   05/28/21  00:09    


 


MCH  30 pg (28-32)   05/28/21  00:09    


 


MCHC  34 % (30-34)   05/28/21  00:09    


 


RDW  14.2 % (13.2-15.2)   05/28/21  00:09    


 


Plt Count  223 K/mm3 (140-440)   05/28/21  00:09    


 


Lymph % (Auto)  24.6 % (13.4-35.0)   05/28/21  00:09    


 


Mono % (Auto)  8.7 % (0.0-7.3)  H  05/28/21  00:09    


 


Eos % (Auto)  7.3 % (0.0-4.3)  H  05/28/21  00:09    


 


Baso % (Auto)  0.7 % (0.0-1.8)   05/28/21  00:09    


 


Lymph # (Auto)  2.1 K/mm3 (1.2-5.4)   05/28/21  00:09    


 


Mono # (Auto)  0.7 K/mm3 (0.0-0.8)   05/28/21  00:09    


 


Eos # (Auto)  0.6 K/mm3 (0.0-0.4)  H  05/28/21  00:09    


 


Baso # (Auto)  0.1 K/mm3 (0.0-0.1)   05/28/21  00:09    


 


Seg Neutrophils %  58.7 % (40.0-70.0)   05/28/21  00:09    


 


Seg Neutrophils #  5.0 K/mm3 (1.8-7.7)   05/28/21  00:09    


 


PT  15.3 Sec. (12.2-14.9)  H  05/26/21  04:56    


 


INR  1.23  (0.87-1.13)  H  05/26/21  04:56    


 


APTT  30.3 Sec. (24.2-36.6)   05/26/21  04:56    


 


D-Dimer  373.6 ng/mlDDU (0-234)  H  05/21/21  Unknown


 


Sodium  139 mmol/L (137-145)   05/28/21  00:09    


 


Potassium  3.7 mmol/L (3.6-5.0)   05/28/21  00:09    


 


Chloride  107.9 mmol/L ()  H  05/28/21  00:09    


 


Carbon Dioxide  24 mmol/L (22-30)   05/28/21  00:09    


 


Anion Gap  11 mmol/L  05/28/21  00:09    


 


BUN  5 mg/dL (7-17)  L  05/28/21  00:09    


 


Creatinine  0.5 mg/dL (0.6-1.2)  L  05/28/21  00:09    


 


Estimated GFR  > 60 ml/min  05/28/21  00:09    


 


BUN/Creatinine Ratio  10 %  05/28/21  00:09    


 


Glucose  113 mg/dL ()  H  05/28/21  00:09    


 


POC Glucose  118 mg/dL ()  H  05/28/21  06:41    


 


Hemoglobin A1c  5.5 % (4-6)   05/22/21  06:38    


 


Lactic Acid  0.70 mmol/L (0.7-2.0)   05/21/21  20:36    


 


Calcium  8.7 mg/dL (8.4-10.2)   05/28/21  00:09    


 


Magnesium  1.70 mg/dL (1.7-2.3)   05/26/21  04:56    


 


Ferritin  382.5 ng/mL (10.0-200.0)  H  05/21/21  Unknown


 


Total Bilirubin  0.40 mg/dL (0.1-1.2)   05/28/21  00:09    


 


AST  14 units/L (5-40)   05/28/21  00:09    


 


ALT  10 units/L (7-56)   05/28/21  00:09    


 


Alkaline Phosphatase  66 units/L ()   05/28/21  00:09    


 


Lactate Dehydrogenase  180 units/L ()   05/21/21  Unknown


 


Troponin T  < 0.010 ng/mL (0.00-0.029)   05/21/21  Unknown


 


C-Reactive Protein  0.00 mg/dL (0.00-1.30)   05/21/21  Unknown


 


NT-Pro-B Natriuret Pep  106.8 pg/mL (0-900)   05/21/21  Unknown


 


Total Protein  6.3 g/dL (6.3-8.2)   05/28/21  00:09    


 


Albumin  3.1 g/dL (3.9-5)  L  05/28/21  00:09    


 


Albumin/Globulin Ratio  1.0 %  05/28/21  00:09    


 


Procalcitonin  < 0.05 ng/mL (<0.15)   05/21/21  Unknown


 


Urine Color  Yellow  (Yellow)   05/22/21  Unknown


 


Urine Turbidity  Clear  (Clear)   05/22/21  Unknown


 


Urine pH  6.0  (5.0-7.0)   05/22/21  Unknown


 


Ur Specific Gravity  1.014  (1.003-1.030)   05/22/21  Unknown


 


Urine Protein  <15 mg/dl mg/dL (Negative)   05/22/21  Unknown


 


Urine Glucose (UA)  Neg mg/dL (Negative)   05/22/21  Unknown


 


Urine Ketones  Neg mg/dL (Negative)   05/22/21  Unknown


 


Urine Blood  Neg  (Negative)   05/22/21  Unknown


 


Urine Nitrite  Neg  (Negative)   05/22/21  Unknown


 


Urine Bilirubin  Neg  (Negative)   05/22/21  Unknown


 


Urine Urobilinogen  < 2.0 mg/dL (<2.0)   05/22/21  Unknown


 


Ur Leukocyte Esterase  Neg  (Negative)   05/22/21  Unknown


 


Urine WBC (Auto)  1.0 /HPF (0.0-6.0)   05/22/21  Unknown


 


Urine RBC (Auto)  < 1.0 /HPF (0.0-6.0)   05/22/21  Unknown


 


U Epithel Cells (Auto)  1.0 /HPF (0-13.0)   05/22/21  Unknown


 


Urine Mucus  Few /HPF  05/22/21  Unknown


 


Coronavirus (PCR)  Negative  (Negative)   05/22/21  Unknown











Gonsalez/IV: 


                                        





Voiding Method                   External Female Catheter











Active Medications





- Current Medications


Current Medications: 














Generic Name Dose Route Start Last Admin





  Trade Name Freq  PRN Reason Stop Dose Admin


 


Acetaminophen  650 mg  05/21/21 22:19 





  Acetaminophen 325 Mg Tab  PO  





  Q4H PRN  





  Pain MILD(1-3)/Fever >100.5/HA  


 


Acetaminophen  325 mg  05/22/21 10:30  05/23/21 17:09





  Acetaminophen 325 Mg Rect Supp  DE   325 mg





  Q6H PRN   Administration





  Fever >101  


 


Amlodipine Besylate  5 mg  05/22/21 10:00  05/27/21 10:46





  Amlodipine 5 Mg Tab  PO   5 mg





  QDAY TOMMY   Administration


 


Lipase/Protease/Amylase  1 each  05/26/21 12:01 





  Lipase 10,500/Protease 25,000/Amylase 43,750 (Units) Dr Valladares  FEEDTUBE  





  PRN PRN  





  For Clogged Feeding Tube  


 


Atorvastatin Calcium  80 mg  05/22/21 10:00  05/27/21 10:45





  Atorvastatin 40 Mg Tab  PO   80 mg





  DAILY TOMMY   Administration


 


Famotidine  20 mg  05/21/21 23:00  05/27/21 22:04





  Famotidine 20 Mg/2 Ml Inj  IV   20 mg





  BID TOMMY   Administration


 


Hydralazine HCl  50 mg  05/21/21 23:00  05/28/21 05:30





  Hydralazine 25 Mg Tab  PO   50 mg





  Q8HR TOMMY   Administration


 


Hydralazine HCl  10 mg  05/22/21 10:30 





  Hydralazine 20 Mg/1 Ml Inj  IV  





  Q4HR PRN  





  Blood Pressure  


 


Potassium Chloride/Dextrose/Sod Cl  20 meq in 1,000 mls @ 125 mls/hr  05/25/21 

10:00  05/28/21 07:50





  D5w/Ns W/Kcl 20meq  IV   125 mls/hr





  AS DIRECT TOMMY   Administration


 


Cefazolin Sodium 2 gm/ Sodium  100 mls @ 200 mls/hr  05/28/21 00:01 





  Chloride  IV  05/28/21 23:59 





  ONCE NR  





  Protocol  


 


Metoclopramide HCl  10 mg  05/21/21 22:19 





  Metoclopramide 10 Mg/2 Ml Inj  IV  





  Q6H PRN  





  Nausea And Vomiting  


 


Morphine Sulfate  2 mg  05/21/21 22:19 





  Morphine 2 Mg/1 Ml Inj  IV  





  Q4H PRN  





  Pain, Moderate (4-6)  


 


Nystatin  500,000 unit  05/24/21 18:00  05/27/21 22:04





  Nystatin 500,000 Unit/5 Ml Oral Liqd  PO   500,000 unit





  QID TOMMY   Administration


 


Ondansetron HCl  4 mg  05/21/21 22:19 





  Ondansetron 4 Mg/2 Ml Inj  IV  





  Q8H PRN  





  Nausea And Vomiting  


 


Simple Syrup  15 ml  05/26/21 12:01 





  Simple Syrup 15 Ml  FEEDTUBE  





  PRN PRN  





  Hypoglycemia  


 


Simple Syrup  30 ml  05/26/21 12:01 





  Simple Syrup 15 Ml  FEEDTUBE  





  PRN PRN  





  Hypoglycemia  


 


Sodium Bicarbonate  325 mg  05/26/21 12:01 





  Sodium Bicarbonate 325 Mg Tab  FEEDTUBE  





  PRN PRN  





  For Clogged Feeding Tube  


 


Sodium Chloride  10 ml  05/22/21 10:00  05/27/21 22:04





  Sodium Chloride 0.9% 10 Ml Flush Syringe  IV   10 ml





  BID TOMMY   Administration


 


Sodium Chloride  10 ml  05/21/21 22:19 





  Sodium Chloride 0.9% 10 Ml Flush Syringe  IV  





  PRN PRN  





  LINE FLUSH  














Nutrition/Malnutrition Assess





- Dietary Evaluation


Nutrition/Malnutrition Findings: 


                                        





Nutrition Notes                                            Start:  05/22/21 

10:14


Freq:                                                      Status: Active       




Protocol:                                                                       




 Document     05/28/21 09:32  CW  (Rec: 05/28/21 09:45  CW  [id number])


 Nutrition Notes


     Initial or Follow up                        Reassessment


     Current Diagnosis                           COPD,Sepsis,Hypertension,


                                                 Respiratory Failure,Stroke


     Other Pertinent Diagnosis                   Acute dysphagia,


                                                 hyperlipidemia, Dementia, pneu


                                                 ,


     Current Diet                                NPO


     Labs/Tests                                  reviewed


     Pertinent Medications                       D5NS w/ 20 mEq of KCl 


                                                 ml/hr


                                                 30 mEq KCl


     Height                                      5 ft 5 in


     Weight                                      64.3 kg


     Ideal Body Weight (kg)                      56.81


     BMI                                         23.6


     Weight change and time frame                12% weight loss x 6 days


     Weight Status                               Appropriate


     Subjective/Other Information                F/U for TF start and tolerance


                                                 . TF held for PEG placement


                                                 today. Jevity 1.2 was running


                                                 at 30 ml/hr prior to TF being


                                                 held. Unable to determine the


                                                 amount infused prior to TF


                                                 being held.


     Burn                                        Absent


     Trauma                                      Absent


     Difficulty In                               Swallowing


     Current % PO                                Negligible


     Minimum of two criteria                     No


     Interpretation of Weight Loss (severe)      >2% in 1 week


     #2


      Nutrition Diagnosis                        Inadequate oral intake


      Diagnosis Progress(for reassessment        Continues


       documentation)                            


     #1


      Nutrition Diagnosis                        Inadequate energy intake


      Diagnosis Progress(for reassessment        Continues


       documentation)                            


     Is patient on ventilator?                   No


     Is Patient Ambulatory and/or Out of Bed     No


     REE-([facility]-confined to bed)      [phone]


     Calculation Used for Recommendations        [facility]


     Additional Notes                            Protein needs are 72-86g (1-1.


                                                 2g/kg)


                                                 Fluid needs are 1ml/kcal


 Nutrition Intervention


     Change Diet Order:                          Recommend TF


     Nutrition Support:                          Recommend Jevity 1.2 at 50 ml/


                                                 hr


                                                 Free water flush 80 mlq4h


     Kcal                                        1,440


     Protein (gm)                                67


     Fluid (mL)                                  969


     Goal #1                                     TF initiation and tolerance


     Goal #2                                     Meet at least 75% of protein


                                                 and energy needs via TF


     Anticipated Discharge Needs:                TF


     Follow-Up By:                               05/31/21


     Additional Comments                         F/U for TF restart and


                                                 tolerance Admission